# Patient Record
Sex: FEMALE | Race: WHITE | NOT HISPANIC OR LATINO
[De-identification: names, ages, dates, MRNs, and addresses within clinical notes are randomized per-mention and may not be internally consistent; named-entity substitution may affect disease eponyms.]

---

## 2019-12-17 ENCOUNTER — APPOINTMENT (OUTPATIENT)
Dept: ENDOCRINOLOGY | Facility: CLINIC | Age: 29
End: 2019-12-17
Payer: COMMERCIAL

## 2019-12-17 VITALS
SYSTOLIC BLOOD PRESSURE: 122 MMHG | DIASTOLIC BLOOD PRESSURE: 80 MMHG | BODY MASS INDEX: 40.5 KG/M2 | HEART RATE: 80 BPM | HEIGHT: 66 IN | WEIGHT: 252 LBS

## 2019-12-17 DIAGNOSIS — Z78.9 OTHER SPECIFIED HEALTH STATUS: ICD-10-CM

## 2019-12-17 DIAGNOSIS — Z83.3 FAMILY HISTORY OF DIABETES MELLITUS: ICD-10-CM

## 2019-12-17 DIAGNOSIS — Z86.39 PERSONAL HISTORY OF OTHER ENDOCRINE, NUTRITIONAL AND METABOLIC DISEASE: ICD-10-CM

## 2019-12-17 PROCEDURE — 99204 OFFICE O/P NEW MOD 45 MIN: CPT

## 2019-12-17 NOTE — ASSESSMENT
[FreeTextEntry1] : hypothyroid, euthyroid on replacement. Check thyroid US due to size of thyroid.\par obesity, complicated by diabetes risk, likely insulin resistance, and PCOS. baseline labs ordered, to include ovarian function to document ovulation, in view of pt's desire for pregnancy. \par Nutritional eval advised. COnsider future use of metformin if weight loss insufficient.

## 2019-12-17 NOTE — PHYSICAL EXAM
[Well Nourished] : well nourished [Well Developed] : well developed [EOMI] : extra ocular movement intact [No Proptosis] : no proptosis [No Accessory Muscle Use] : no accessory muscle use [Clear to Auscultation] : lungs were clear to auscultation bilaterally [Normal Rate] : heart rate was normal  [Regular Rhythm] : with a regular rhythm [No Stigmata of Cushings Syndrome] : no stigmata of cushings syndrome [No Joint Swelling] : no joint swelling seen [Normal Strength/Tone] : muscle strength and tone were normal [Acanthosis Nigricans] : no acanthosis nigricans [No Motor Deficits] : the motor exam was normal [No Tremors] : no tremors [Normal Affect] : the affect was normal [Normal Mood] : the mood was normal [de-identified] : generalized obesity [de-identified] : bulky uniformly enlarged thyroid, no palpable nodule [de-identified] : warm and dry

## 2019-12-17 NOTE — REASON FOR VISIT
[Initial Eval - Existing Diagnosis] : an initial evaluation of an existing diagnosis [FreeTextEntry1] : Hypothyroidism and PCOS

## 2019-12-31 ENCOUNTER — TRANSCRIPTION ENCOUNTER (OUTPATIENT)
Age: 29
End: 2019-12-31

## 2020-01-07 ENCOUNTER — RX RENEWAL (OUTPATIENT)
Age: 30
End: 2020-01-07

## 2020-01-17 ENCOUNTER — APPOINTMENT (OUTPATIENT)
Dept: ENDOCRINOLOGY | Facility: CLINIC | Age: 30
End: 2020-01-17

## 2020-01-23 ENCOUNTER — APPOINTMENT (OUTPATIENT)
Dept: ENDOCRINOLOGY | Facility: CLINIC | Age: 30
End: 2020-01-23
Payer: COMMERCIAL

## 2020-01-23 PROCEDURE — G0108 DIAB MANAGE TRN  PER INDIV: CPT

## 2020-01-24 LAB — HBA1C MFR BLD HPLC: 6.5

## 2020-03-26 ENCOUNTER — APPOINTMENT (OUTPATIENT)
Dept: ENDOCRINOLOGY | Facility: CLINIC | Age: 30
End: 2020-03-26

## 2020-04-01 ENCOUNTER — RX RENEWAL (OUTPATIENT)
Age: 30
End: 2020-04-01

## 2020-04-21 ENCOUNTER — APPOINTMENT (OUTPATIENT)
Dept: ENDOCRINOLOGY | Facility: CLINIC | Age: 30
End: 2020-04-21
Payer: COMMERCIAL

## 2020-04-21 PROCEDURE — 99441: CPT

## 2020-07-09 ENCOUNTER — APPOINTMENT (OUTPATIENT)
Dept: OBGYN | Facility: CLINIC | Age: 30
End: 2020-07-09
Payer: COMMERCIAL

## 2020-07-09 VITALS
WEIGHT: 235 LBS | BODY MASS INDEX: 37.77 KG/M2 | DIASTOLIC BLOOD PRESSURE: 70 MMHG | SYSTOLIC BLOOD PRESSURE: 120 MMHG | HEIGHT: 66 IN

## 2020-07-09 DIAGNOSIS — Z83.3 FAMILY HISTORY OF DIABETES MELLITUS: ICD-10-CM

## 2020-07-09 DIAGNOSIS — Z00.00 ENCOUNTER FOR GENERAL ADULT MEDICAL EXAMINATION W/OUT ABNORMAL FINDINGS: ICD-10-CM

## 2020-07-09 DIAGNOSIS — E66.01 MORBID (SEVERE) OBESITY DUE TO EXCESS CALORIES: ICD-10-CM

## 2020-07-09 DIAGNOSIS — Z82.49 FAMILY HISTORY OF ISCHEMIC HEART DISEASE AND OTHER DISEASES OF THE CIRCULATORY SYSTEM: ICD-10-CM

## 2020-07-09 DIAGNOSIS — Z01.419 ENCOUNTER FOR GYNECOLOGICAL EXAMINATION (GENERAL) (ROUTINE) W/OUT ABNORMAL FINDINGS: ICD-10-CM

## 2020-07-09 DIAGNOSIS — Z80.0 FAMILY HISTORY OF MALIGNANT NEOPLASM OF DIGESTIVE ORGANS: ICD-10-CM

## 2020-07-09 DIAGNOSIS — Z80.3 FAMILY HISTORY OF MALIGNANT NEOPLASM OF BREAST: ICD-10-CM

## 2020-07-09 PROCEDURE — 99385 PREV VISIT NEW AGE 18-39: CPT

## 2020-07-09 NOTE — HISTORY OF PRESENT ILLNESS
[Last Colonoscopy ___] : Last colonoscopy [unfilled] [Last Pap ___] : Last cervical pap smear was [unfilled] [Last Mammogram ___] : Last Mammogram was [unfilled] [Sexually Active] : is sexually active [Male ___] : [unfilled] male

## 2020-07-10 NOTE — COUNSELING
[Nutrition] : nutrition [Breast Self Exam] : breast self exam [Exercise] : exercise [STD (testing, results, tx)] : STD (testing, results, tx) [Fertility Options] : fertility options [Vitamins/Supplements] : vitamins/supplements [Weight Management] : weight management

## 2020-07-10 NOTE — REVIEW OF SYSTEMS
[Urinary Incontinence] : urinary incontinence [Abdominal Pain] : abdominal pain [Sleep Disturbances] : sleep disturbances [FreeTextEntry1] : nausea

## 2020-07-10 NOTE — PHYSICAL EXAM
[Awake] : awake [Alert] : alert [Soft] : soft [Oriented x3] : oriented to person, place, and time [Labia Majora] : labia major [No Bleeding] : there was no active vaginal bleeding [Normal] : clitoris [Labia Minora] : labia minora [Uterine Adnexae] : were not tender and not enlarged [Acute Distress] : no acute distress [Mass] : no breast mass [Nipple Discharge] : no nipple discharge [Axillary LAD] : no axillary lymphadenopathy [Tender] : non tender [FreeTextEntry6] : normal

## 2020-07-10 NOTE — DISCUSSION/SUMMARY
[FreeTextEntry1] : Pt encouraged to continue with weight loss program, health risk reviewed.  Discussed ovulation, and timing of coitus.  Pt is taking PNV.  All the pt's questions and concerns were addressed.

## 2020-07-12 LAB — HPV HIGH+LOW RISK DNA PNL CVX: NOT DETECTED

## 2020-07-14 LAB — CYTOLOGY CVX/VAG DOC THIN PREP: NORMAL

## 2020-08-21 ENCOUNTER — TRANSCRIPTION ENCOUNTER (OUTPATIENT)
Age: 30
End: 2020-08-21

## 2020-10-02 ENCOUNTER — RX RENEWAL (OUTPATIENT)
Age: 30
End: 2020-10-02

## 2020-11-04 LAB — HBA1C MFR BLD HPLC: 5.8

## 2020-11-05 ENCOUNTER — APPOINTMENT (OUTPATIENT)
Dept: ENDOCRINOLOGY | Facility: CLINIC | Age: 30
End: 2020-11-05
Payer: COMMERCIAL

## 2020-11-05 VITALS
HEART RATE: 74 BPM | HEIGHT: 66 IN | WEIGHT: 239 LBS | BODY MASS INDEX: 38.41 KG/M2 | DIASTOLIC BLOOD PRESSURE: 70 MMHG | SYSTOLIC BLOOD PRESSURE: 124 MMHG | OXYGEN SATURATION: 98 %

## 2020-11-05 PROCEDURE — 99072 ADDL SUPL MATRL&STAF TM PHE: CPT

## 2020-11-05 PROCEDURE — 99214 OFFICE O/P EST MOD 30 MIN: CPT

## 2020-11-05 NOTE — REVIEW OF SYSTEMS
[Recent Weight Gain (___ Lbs)] : recent weight gain: [unfilled] lbs [Cold Intolerance] : cold intolerance [Fatigue] : no fatigue [Decreased Appetite] : appetite not decreased [Visual Field Defect] : no visual field defect [Blurred Vision] : no blurred vision [Dysphagia] : no dysphagia [Neck Pain] : no neck pain [Dysphonia] : no dysphonia [Chest Pain] : no chest pain [Palpitations] : no palpitations [Constipation] : no constipation [Diarrhea] : no diarrhea [Polyuria] : no polyuria [Dysuria] : no dysuria [Headaches] : no headaches [Tremors] : no tremors [Depression] : no depression [Anxiety] : no anxiety [Polydipsia] : no polydipsia [Heat Intolerance] : no heat intolerance [Swelling] : no swelling

## 2020-11-05 NOTE — HISTORY OF PRESENT ILLNESS
[FreeTextEntry1] : Quality: Hypothyroidism\par Severity: moderate \par Duration: over 4 years ago \par Onset: weight gain, fatigue \par Modifying Factors: Better with medication \par Associated Symptoms: no neck pain, dysphonia, or dysphagia \par Family History: diabetes - "all women in my family"\par \par Notes:\par Desires pregnancy - current trying to conceive, taking Prenatal vitamins and Vitamin D  \par LMP 10/20/20 - about 28 days in between menses  \par \par \par Current Regimen: Levothyroxine 88 mcg daily - taking appropriately, waits an hour to eat or drink anything \par \par \par Labs reviewed: No recent labs \par \par Date of last thyroid sonogram: 1/7/20 Impression: Heterogenous in appearance without focal nodules. The right lobe is normal in size and the left lobe is mildly prominent size. Borderline enlarged benign appearing level II lymph node see in each side of neck. \par \par Prediabetes: lifestyle modifications only \par \par PCOS: Contraceptive: None \par mildly irregular menses, no amenorrhea \par briefly on OCPs\par \par Chronic Obesity \par Weight: fluctuates about 20 pounds \par

## 2020-11-05 NOTE — PHYSICAL EXAM
[Alert] : alert [Obese] : obese [No Acute Distress] : no acute distress [Normal Sclera/Conjunctiva] : normal sclera/conjunctiva [EOMI] : extra ocular movement intact [No LAD] : no lymphadenopathy [No Thyroid Nodules] : no palpable thyroid nodules [No Respiratory Distress] : no respiratory distress [Normal Rate and Effort] : normal respiratory rate and effort [Clear to Auscultation] : lungs were clear to auscultation bilaterally [Normal S1, S2] : normal S1 and S2 [Normal Rate] : heart rate was normal [Regular Rhythm] : with a regular rhythm [No Edema] : no peripheral edema [Normal Bowel Sounds] : normal bowel sounds [Not Tender] : non-tender [Soft] : abdomen soft [No Stigmata of Cushings Syndrome] : no stigmata of Cushings Syndrome [Normal Gait] : normal gait [No Rash] : no rash [Acanthosis Nigricans] : no acanthosis nigricans [No Motor Deficits] : the motor exam was normal [No Tremors] : no tremors [Oriented x3] : oriented to person, place, and time [Normal Insight/Judgement] : insight and judgment were intact [Normal Mood] : the mood was normal [de-identified] : bulky uniformly enlarged

## 2020-11-05 NOTE — REASON FOR VISIT
[Follow - Up] : a follow-up visit [DM Type 2] : DM Type 2 [Hypothyroidism] : hypothyroidism [PCOS] : PCOS [Weight Management/Obesity] : weight management/obesity

## 2020-11-05 NOTE — ASSESSMENT
[FreeTextEntry1] : 29 y/o obese female with Hypothyroidism, Type 2 DM (now prediabetes), and PCOS.\par \par Plan: \par Hypothyroidism: check TFTs now\par - reviewed TSH goal < 2.5 in pregnancy \par - continue current dose of LT4\par \par Type 2 DM (now prediabetes): check A1C now \par \par PCOS: start Metformin 500 mg BID, CoQ10 200 mg and Vitamin E 400 units daily \par \par Obesity: educated on healthy food choices\par - encouraged to continue routine exercise 150 minutes a week \par \par RTO in 3 months or sooner if becomes pregnant  [Importance of Diet and Exercise] : importance of diet and exercise to improve glycemic control, achieve weight loss and improve cardiovascular health [Levothyroxine] : The patient was instructed to take Levothyroxine on an empty stomach, separate from vitamins, and wait at least 30 minutes before eating

## 2020-12-23 PROBLEM — Z01.419 ENCOUNTER FOR ANNUAL ROUTINE GYNECOLOGICAL EXAMINATION: Status: RESOLVED | Noted: 2020-07-09 | Resolved: 2020-12-23

## 2021-01-29 ENCOUNTER — RX RENEWAL (OUTPATIENT)
Age: 31
End: 2021-01-29

## 2021-02-04 ENCOUNTER — APPOINTMENT (OUTPATIENT)
Dept: ENDOCRINOLOGY | Facility: CLINIC | Age: 31
End: 2021-02-04

## 2021-03-07 ENCOUNTER — TRANSCRIPTION ENCOUNTER (OUTPATIENT)
Age: 31
End: 2021-03-07

## 2021-03-18 ENCOUNTER — APPOINTMENT (OUTPATIENT)
Dept: OBGYN | Facility: CLINIC | Age: 31
End: 2021-03-18
Payer: MEDICAID

## 2021-03-18 ENCOUNTER — ASOB RESULT (OUTPATIENT)
Age: 31
End: 2021-03-18

## 2021-03-18 VITALS
HEIGHT: 66 IN | TEMPERATURE: 97 F | DIASTOLIC BLOOD PRESSURE: 80 MMHG | BODY MASS INDEX: 38.89 KG/M2 | SYSTOLIC BLOOD PRESSURE: 124 MMHG | WEIGHT: 242 LBS

## 2021-03-18 DIAGNOSIS — Z31.69 ENCOUNTER FOR OTHER GENERAL COUNSELING AND ADVICE ON PROCREATION: ICD-10-CM

## 2021-03-18 DIAGNOSIS — Z12.4 ENCOUNTER FOR SCREENING FOR MALIGNANT NEOPLASM OF CERVIX: ICD-10-CM

## 2021-03-18 LAB
HCG UR QL: POSITIVE
QUALITY CONTROL: YES

## 2021-03-18 PROCEDURE — 76817 TRANSVAGINAL US OBSTETRIC: CPT

## 2021-03-18 PROCEDURE — 81025 URINE PREGNANCY TEST: CPT

## 2021-03-18 PROCEDURE — 36415 COLL VENOUS BLD VENIPUNCTURE: CPT

## 2021-03-18 PROCEDURE — 99213 OFFICE O/P EST LOW 20 MIN: CPT

## 2021-03-18 NOTE — REVIEW OF SYSTEMS
[Negative] : Heme/Lymph [Abn Vaginal bleeding] : no abnormal vaginal bleeding [Pelvic pain] : pelvic pain

## 2021-03-18 NOTE — DISCUSSION/SUMMARY
[FreeTextEntry1] : Today UCG: positive. \par \par Pelvic sono ordered due to cramping and to assess viability of pregnancy. Small GS. No YS or FP. Will check beta and progesterone today and she will return on Monday for repeat labs.\par \par Precautions discussed.\par \par Recommended her to continue taking PNV. She expressed understanding.\par \par All questions and concerns were addressed.\par \par \par \par

## 2021-03-18 NOTE — HISTORY OF PRESENT ILLNESS
[N] : Patient does not use contraception [Y] : Positive pregnancy history [Menarche Age: ____] : age at menarche was [unfilled] [No] : Patient does not have concerns regarding sex [Currently Active] : currently active [Men] : men [PapSmeardate] : 7/09/2020 [TextBox_31] : wnl [HPVDate] : 7/09/2020 [TextBox_78] : neg [LMPDate] : 2/09/21 [PGxTotal] : 1 [FreeTextEntry1] : 2/09/21

## 2021-03-18 NOTE — END OF VISIT
[FreeTextEntry3] : I, Kingsley Edmonds, acted solely as a scribe for Dr. Barnett on this date 03/18/2021.\par All medical record entries made by the Scribe were at my, Dr. Barnett's direction and personally dictated by me on  03/18/2021. I have reviewed the chart and agree that the record accurately reflects my personal performance of the history, physical exam, assessment and plan. I have also personally directed, reviewed, and agreed with the chart.\par

## 2021-03-20 LAB
ABO + RH PNL BLD: NORMAL
BLD GP AB SCN SERPL QL: NORMAL
C TRACH RRNA SPEC QL NAA+PROBE: NOT DETECTED
HCG-TM SERPL-MCNC: 1122 MIU/ML
N GONORRHOEA RRNA SPEC QL NAA+PROBE: NOT DETECTED
PROGEST SERPL-MCNC: 17.3 NG/ML
SOURCE AMPLIFICATION: NORMAL

## 2021-03-21 ENCOUNTER — RX RENEWAL (OUTPATIENT)
Age: 31
End: 2021-03-21

## 2021-03-22 ENCOUNTER — APPOINTMENT (OUTPATIENT)
Dept: OBGYN | Facility: CLINIC | Age: 31
End: 2021-03-22
Payer: MEDICAID

## 2021-03-22 PROCEDURE — 36415 COLL VENOUS BLD VENIPUNCTURE: CPT

## 2021-03-25 ENCOUNTER — NON-APPOINTMENT (OUTPATIENT)
Age: 31
End: 2021-03-25

## 2021-03-26 ENCOUNTER — APPOINTMENT (OUTPATIENT)
Dept: OBGYN | Facility: CLINIC | Age: 31
End: 2021-03-26
Payer: MEDICAID

## 2021-03-26 ENCOUNTER — ASOB RESULT (OUTPATIENT)
Age: 31
End: 2021-03-26

## 2021-03-26 VITALS
HEIGHT: 66 IN | WEIGHT: 242 LBS | TEMPERATURE: 97.8 F | BODY MASS INDEX: 38.89 KG/M2 | DIASTOLIC BLOOD PRESSURE: 68 MMHG | SYSTOLIC BLOOD PRESSURE: 120 MMHG

## 2021-03-26 PROCEDURE — 76817 TRANSVAGINAL US OBSTETRIC: CPT

## 2021-03-26 PROCEDURE — 36415 COLL VENOUS BLD VENIPUNCTURE: CPT

## 2021-03-26 PROCEDURE — 99213 OFFICE O/P EST LOW 20 MIN: CPT | Mod: 25

## 2021-03-26 PROCEDURE — 81003 URINALYSIS AUTO W/O SCOPE: CPT | Mod: QW

## 2021-03-26 RX ORDER — METFORMIN ER 500 MG 500 MG/1
500 TABLET ORAL TWICE DAILY
Qty: 180 | Refills: 0 | Status: DISCONTINUED | COMMUNITY
Start: 2020-11-05 | End: 2021-03-26

## 2021-03-28 LAB
B19V IGG SER QL IA: 8.44 INDEX
B19V IGG+IGM SER-IMP: NORMAL
B19V IGG+IGM SER-IMP: POSITIVE
B19V IGM FLD-ACNC: 0.12 INDEX
B19V IGM SER-ACNC: NEGATIVE
BASOPHILS # BLD AUTO: 0.04 K/UL
BASOPHILS NFR BLD AUTO: 0.4 %
CMV IGG SERPL QL: <0.2 U/ML
CMV IGG SERPL-IMP: NEGATIVE
CMV IGM SERPL QL: <8 AU/ML
CMV IGM SERPL QL: NEGATIVE
EOSINOPHIL # BLD AUTO: 0.1 K/UL
EOSINOPHIL NFR BLD AUTO: 1 %
HBV SURFACE AG SER QL: NONREACTIVE
HCT VFR BLD CALC: 38.5 %
HGB BLD-MCNC: 12.5 G/DL
HIV1+2 AB SPEC QL IA.RAPID: NONREACTIVE
IMM GRANULOCYTES NFR BLD AUTO: 0.3 %
LYMPHOCYTES # BLD AUTO: 2.71 K/UL
LYMPHOCYTES NFR BLD AUTO: 27.5 %
MAN DIFF?: NORMAL
MCHC RBC-ENTMCNC: 31.6 PG
MCHC RBC-ENTMCNC: 32.5 GM/DL
MCV RBC AUTO: 97.5 FL
MEV IGG FLD QL IA: >300 AU/ML
MEV IGG+IGM SER-IMP: POSITIVE
MONOCYTES # BLD AUTO: 0.5 K/UL
MONOCYTES NFR BLD AUTO: 5.1 %
NEUTROPHILS # BLD AUTO: 6.46 K/UL
NEUTROPHILS NFR BLD AUTO: 65.7 %
PLATELET # BLD AUTO: 273 K/UL
RBC # BLD: 3.95 M/UL
RBC # FLD: 13.5 %
RUBV IGG FLD-ACNC: 4.4 INDEX
RUBV IGG SER-IMP: POSITIVE
T GONDII AB SER-IMP: NEGATIVE
T GONDII IGM SER QL: <3 AU/ML
T PALLIDUM AB SER QL IA: NEGATIVE
TSH SERPL-ACNC: 1.77 UIU/ML
WBC # FLD AUTO: 9.84 K/UL

## 2021-03-28 NOTE — REVIEW OF SYSTEMS
[Sight Problems] : sight problems [Negative] : Heme/Lymph [FreeTextEntry7] : no pelvic pain today no cramping as per pt

## 2021-03-28 NOTE — DISCUSSION/SUMMARY
[FreeTextEntry1] : We reviewed the results of pelvic US revealed SIUP of 6 weeks w/ FH: 106. She was given a full description of the findings and expressed understanding. We discussed benign findings on pelvic exam which was consistent w/ closed and long cervix. No vaginal blood was noted.  I explained to her that as her uterus response to pregnancy, it gets more globular and stretchy due to which she is experiencing some cramping. She was oriented to the practice and delivery at [facility]. Initial OB labs were collected today. RTO in 4 weeks for NT scan. All questions and concerns were addressed.\par \par We reviewed the nutritional needs and restrictions associated with pregnancy. She is aware of the collaborative nature of our practice and that we deliver our babies at [facility].  \par \par \par During this visit 20 minutes were spent face-to-face with greater than 50% of the time dedicated to counseling.\par \par

## 2021-03-28 NOTE — HISTORY OF PRESENT ILLNESS
[N] : Patient does not use contraception [Y] : Patient is sexually active [Menarche Age: ____] : age at menarche was [unfilled] [Currently Active] : currently active [Men] : men [Vaginal] : vaginal [No] : No [TextBox_4] : Early Ob cramping comes and goes , pt states left arm going numb thru out the day  [PapSmeardate] : 7/9/20 [TextBox_31] : negative [LMPDate] : 2/9/21 [PGxTotal] : 1 [[facility] [[facility]] : [facility] [TextBox_29] : not today [TextBox_36] : n/a [TextBox_6] : 2/9/21 [TextBox_9] : 14 [TextBox_28] : n/a [TextBox_34] : n/a [FreeTextEntry1] : 2/9/21

## 2021-03-28 NOTE — END OF VISIT
[FreeTextEntry3] : I, Kingsley Edmonds, acted solely as a scribe for Dr. Samuel on this date 03/26/2021.\par All medical record entries made by the Scribe were at my, Dr. Samuel's direction and personally dictated by me on  03/26/2021. I have reviewed the chart and agree that the record accurately reflects my personal performance of the history, physical exam, assessment and plan. I have also personally directed, reviewed, and agreed with the chart.\par

## 2021-04-02 LAB
HCG SERPL-MCNC: 3294 MIU/ML
PROGEST SERPL-MCNC: 16.2 NG/ML

## 2021-04-16 ENCOUNTER — APPOINTMENT (OUTPATIENT)
Dept: ENDOCRINOLOGY | Facility: CLINIC | Age: 31
End: 2021-04-16

## 2021-04-23 ENCOUNTER — APPOINTMENT (OUTPATIENT)
Dept: OBGYN | Facility: CLINIC | Age: 31
End: 2021-04-23
Payer: MEDICAID

## 2021-04-23 ENCOUNTER — NON-APPOINTMENT (OUTPATIENT)
Age: 31
End: 2021-04-23

## 2021-04-23 ENCOUNTER — LABORATORY RESULT (OUTPATIENT)
Age: 31
End: 2021-04-23

## 2021-04-23 PROCEDURE — 99072 ADDL SUPL MATRL&STAF TM PHE: CPT

## 2021-04-23 PROCEDURE — 76817 TRANSVAGINAL US OBSTETRIC: CPT

## 2021-04-23 PROCEDURE — 0500F INITIAL PRENATAL CARE VISIT: CPT

## 2021-04-26 LAB — BACTERIA UR CULT: NORMAL

## 2021-05-07 ENCOUNTER — NON-APPOINTMENT (OUTPATIENT)
Age: 31
End: 2021-05-07

## 2021-05-07 ENCOUNTER — APPOINTMENT (OUTPATIENT)
Dept: OBGYN | Facility: CLINIC | Age: 31
End: 2021-05-07
Payer: MEDICAID

## 2021-05-07 VITALS
WEIGHT: 239 LBS | HEIGHT: 66 IN | DIASTOLIC BLOOD PRESSURE: 80 MMHG | SYSTOLIC BLOOD PRESSURE: 116 MMHG | BODY MASS INDEX: 38.41 KG/M2

## 2021-05-07 PROCEDURE — 76813 OB US NUCHAL MEAS 1 GEST: CPT

## 2021-05-07 PROCEDURE — 0502F SUBSEQUENT PRENATAL CARE: CPT

## 2021-05-07 PROCEDURE — 99072 ADDL SUPL MATRL&STAF TM PHE: CPT

## 2021-05-07 PROCEDURE — 36415 COLL VENOUS BLD VENIPUNCTURE: CPT

## 2021-05-08 LAB
BILIRUB UR QL STRIP: NORMAL
GLUCOSE UR-MCNC: NORMAL
HCG UR QL: 0.2 EU/DL
HGB UR QL STRIP.AUTO: NORMAL
KETONES UR-MCNC: NORMAL
LEUKOCYTE ESTERASE UR QL STRIP: NORMAL
NITRITE UR QL STRIP: NORMAL
PH UR STRIP: 7
PROT UR STRIP-MCNC: NORMAL
SP GR UR STRIP: 1.02

## 2021-05-11 ENCOUNTER — NON-APPOINTMENT (OUTPATIENT)
Age: 31
End: 2021-05-11

## 2021-05-18 LAB — GLUCOSE 1H P 50 G GLC PO SERPL-MCNC: 158 MG/DL

## 2021-05-20 ENCOUNTER — NON-APPOINTMENT (OUTPATIENT)
Age: 31
End: 2021-05-20

## 2021-05-28 LAB
1ST TRIMESTER DATA: NORMAL
ADDENDUM DOC: NORMAL
AFP PNL SERPL: NORMAL
AFP SERPL-ACNC: NORMAL
CLINICAL BIOCHEMIST REVIEW: NORMAL
FREE BETA HCG 1ST TRIMESTER: NORMAL
GLUCOSE 1H P 100 G GLC PO SERPL-MCNC: 241 MG/DL
GLUCOSE 2H P CHAL SERPL-MCNC: 145 MG/DL
GLUCOSE 3H P CHAL SERPL-MCNC: 129 MG/DL
GLUCOSE BS SERPL-MCNC: 111 MG/DL
Lab: NORMAL
NASAL BONE: PRESENT
NOTES NTD: NORMAL
NT: NORMAL
PAPP-A SERPL-ACNC: NORMAL
TRISOMY 18/3: NORMAL

## 2021-06-02 ENCOUNTER — NON-APPOINTMENT (OUTPATIENT)
Age: 31
End: 2021-06-02

## 2021-06-02 ENCOUNTER — ASOB RESULT (OUTPATIENT)
Age: 31
End: 2021-06-02

## 2021-06-02 ENCOUNTER — APPOINTMENT (OUTPATIENT)
Dept: MATERNAL FETAL MEDICINE | Facility: CLINIC | Age: 31
End: 2021-06-02
Payer: MEDICAID

## 2021-06-02 VITALS — WEIGHT: 239 LBS | HEIGHT: 66 IN | BODY MASS INDEX: 38.41 KG/M2

## 2021-06-02 DIAGNOSIS — Z86.39 PERSONAL HISTORY OF OTHER ENDOCRINE, NUTRITIONAL AND METABOLIC DISEASE: ICD-10-CM

## 2021-06-02 PROCEDURE — G0108 DIAB MANAGE TRN  PER INDIV: CPT | Mod: 95

## 2021-06-03 ENCOUNTER — NON-APPOINTMENT (OUTPATIENT)
Age: 31
End: 2021-06-03

## 2021-06-03 ENCOUNTER — APPOINTMENT (OUTPATIENT)
Dept: OBGYN | Facility: CLINIC | Age: 31
End: 2021-06-03
Payer: MEDICAID

## 2021-06-03 VITALS
BODY MASS INDEX: 38.09 KG/M2 | HEIGHT: 66 IN | DIASTOLIC BLOOD PRESSURE: 80 MMHG | WEIGHT: 237 LBS | SYSTOLIC BLOOD PRESSURE: 100 MMHG

## 2021-06-03 LAB
BILIRUB UR QL STRIP: NORMAL
GLUCOSE UR-MCNC: NORMAL
HCG UR QL: 0.2 EU/DL
HGB UR QL STRIP.AUTO: ABNORMAL
KETONES UR-MCNC: ABNORMAL
LEUKOCYTE ESTERASE UR QL STRIP: NORMAL
NITRITE UR QL STRIP: NORMAL
PH UR STRIP: 5.5
PROT UR STRIP-MCNC: NORMAL
SP GR UR STRIP: 1.02

## 2021-06-03 PROCEDURE — 0502F SUBSEQUENT PRENATAL CARE: CPT

## 2021-06-05 LAB — BACTERIA UR CULT: NORMAL

## 2021-06-08 LAB
1ST TRIMESTER DATA: NORMAL
2ND TRIMESTER DATA: NORMAL
AFP PNL SERPL: NORMAL
AFP SERPL-ACNC: NORMAL
AFP SERPL-ACNC: NORMAL
B-HCG FREE SERPL-MCNC: NORMAL
CLINICAL BIOCHEMIST REVIEW: NORMAL
FREE BETA HCG 1ST TRIMESTER: NORMAL
INHIBIN A SERPL-MCNC: NORMAL
NASAL BONE: PRESENT
NOTES NTD: NORMAL
NT: NORMAL
PAPP-A SERPL-ACNC: NORMAL
U ESTRIOL SERPL-SCNC: NORMAL

## 2021-06-14 ENCOUNTER — APPOINTMENT (OUTPATIENT)
Dept: ANTEPARTUM | Facility: CLINIC | Age: 31
End: 2021-06-14

## 2021-06-14 ENCOUNTER — APPOINTMENT (OUTPATIENT)
Dept: MATERNAL FETAL MEDICINE | Facility: CLINIC | Age: 31
End: 2021-06-14
Payer: MEDICAID

## 2021-06-14 VITALS
WEIGHT: 238 LBS | HEART RATE: 78 BPM | HEIGHT: 66 IN | SYSTOLIC BLOOD PRESSURE: 112 MMHG | BODY MASS INDEX: 38.25 KG/M2 | DIASTOLIC BLOOD PRESSURE: 70 MMHG | RESPIRATION RATE: 16 BRPM | OXYGEN SATURATION: 98 %

## 2021-06-14 DIAGNOSIS — Z78.9 OTHER SPECIFIED HEALTH STATUS: ICD-10-CM

## 2021-06-14 PROCEDURE — 99204 OFFICE O/P NEW MOD 45 MIN: CPT

## 2021-06-14 PROCEDURE — 99072 ADDL SUPL MATRL&STAF TM PHE: CPT

## 2021-06-14 RX ORDER — ISOPROPYL ALCOHOL 0.7 ML/ML
SWAB TOPICAL
Qty: 1 | Refills: 3 | Status: ACTIVE | COMMUNITY
Start: 2021-06-14 | End: 1900-01-01

## 2021-06-14 RX ORDER — LANCETS 30 GAUGE
EACH MISCELLANEOUS
Qty: 1 | Refills: 1 | Status: ACTIVE | COMMUNITY
Start: 2020-01-23 | End: 1900-01-01

## 2021-06-14 RX ORDER — CONTAINER,EMPTY
EACH MISCELLANEOUS
Qty: 1 | Refills: 0 | Status: ACTIVE | COMMUNITY
Start: 2021-06-14 | End: 1900-01-01

## 2021-06-14 NOTE — SURGICAL HISTORY
[Fibroids] : no fibroids [Abn Paps] : no abnormal pap smears [Breast Disease] : no breast disease [STI's] : no STI's [Cysts] : no cysts [Infertility] : no infertility [OC Use] : no OC use [Last Pap: ___] : Last Pap: [unfilled] [Last Mammo: ___] : Last Mammo: none

## 2021-06-14 NOTE — DISCUSSION/SUMMARY
[FreeTextEntry1] : We had the pleasure of meeting with your patient, Elaine Calvillo, for a maternal-fetal medicine consultation. As you know, the patient is a 31-year-old  at 17 6/7 weeks with a pregnancy complicated by gestational diabetes, hypothyroidism, and obesity.\par \par She reports no complaints today. She denies contractions, leaking and vaginal bleeding.\par \par In 2020, her HgbA1c was 6.5%, which is consistent with type 2 diabetes, but repeat HgbA1c in 2020 was 5.8%. No recent HgbA1c available for evaluation. \par \par She is currently taking prenatal vitamins, low dose aspirin, and levothyroxine 88 mcg daily.\par \par She was extensively counseled regarding the following issues:\par \par •	Gestational diabetes\par \par Elaine was counseled regarding diet, blood sugar testing, and managing diabetes during pregnancy. Tight glycemic control in pregnancy is necessary to decrease fetal and  risks including macrosomia, shoulder dystocia, medically or obstetrically-indicated  delivery,  section, polyhydramnios, and preeclampsia. It was discussed that women who are able to maintain good glycemic control with diet and/or medication generally have favorable obstetric outcomes. She understands that fasting glucose values should be <90 and 1-hour postprandial values should be <140. Her fingerstick log was reviewed. Fasting fingerstick glucose values are persistently elevated. Postprandial values are within the goal range. She was instructed to continue checking fingersticks 4 times daily and to bring her log to all appointments. I recommend starting insulin NPH 14 units at bedtime—prescription sent to pharmacy. Her fingerstick log will be reassessed at her next visit to determine whether further adjustment of her insulin regimen is necessary. She is encouraged to have a two-hour glucose tolerance test at 6-8 weeks postpartum to evaluate for diabetes mellitus and insulin resistance. In general, a patient with gestational diabetes well-controlled on insulin should be delivered no earlier than 39 0/7 weeks and no later than 39 6/7 weeks. However, if glycemic control remains poor then delivery may need to occur earlier to avoid fetopathy and stillbirth.\par \par •	Hypothyroidism in pregnancy\par \par The patient was counseled that hypothyroidism is pregnancy, when not well controlled, is associated with an increased risk of  birth, gestational hypertension, preeclampsia, low birth weight, placental abruption and fetal neurocognitive impairment. Thus, maintaining optimal thyroid function is essential. She is currently taking levothyroxine at an effective dose of 88 mcg daily. In general, TSH should be tested every trimester to confirm that it is within trimester-specific goal ranges: 0.1-2.5 uIU/mL in the first trimester, 0.2-3 uIU/mL in the second trimester and 0.3-3 uIU/mL in the third trimester. Follow-up with an endocrinologist is scheduled for 21.\par \par •	Obesity, class 2\par \par Obesity is associated with an increased risk for pregnancy complications such as gestational diabetes and preeclampsia. Complications from surgery are increased, as well as failure of regional anesthesia. In addition, the fetus is at increased risk for congenital anomalies, most commonly neural tube defects and cardiac anomalies, even in the absence of diabetes.  Malformations are more difficult to assess by ultrasound evaluation due to the decreased sensitivity of ultrasound in women with a high BMI. During pregnancy, optimum weight gain recommended by the [employer] 2009 Guidelines is 11-20 pounds. Furthermore, pregnant women with obesity are at a greater risk for venous thromboembolism. If a  section is performed, chemoprophylaxis is recommended during postpartum hospitalization. \par \par She will follow-up with the diabetes educator/nutritionist on 21.\par Follow-up growth ultrasounds every 4 weeks\par Weekly fetal testing to start by 36 weeks.\par \par \par Thank you for requesting a consultation on this patient for gestational diabetes, obesity, hypothyroidism. The total time spent in preparation for this visit, medical history taking, orders, review of records, counseling the patient, and writing this note was 53 minutes.\par \par At the end of our discussion, the patient indicated that her questions were answered and she seemed satisfied with our discussion. Please do not hesitate to contact us with any questions.\par \par \par Sincerely,\par \par \par Piyush Dela Cruz MD, OG\par Attending Physician, Maternal-Fetal Medicine\par \par

## 2021-06-14 NOTE — ACTIVE PROBLEMS
[Hypertension] : no hypertension [Heart Disease] : no heart disease [Autoimmune Disease] : no autoimmune disease [Renal Disease] : no kidney disease, no UTI [Neurologic Disorder] : no neurologic disorder, no epilepsy [Psychiatric Disorders] : no psychiatric disorders [Depression] : no depression, no post partum depression [Hepatic Disorder] : no hepatitis, no liver disease [Thrombophlebitis] : no varicosities, no phlebitis [Trauma] : no trauma/violence [Blood Transfusion (___ Ml)] : no history of blood transfusion

## 2021-06-14 NOTE — OB HISTORY
[LMP: ___] : LMP: [unfilled] [RICHARD: ___] : RICHARD: [unfilled] [EGA: ___ wks] : EGA: [unfilled] wks [Spontaneous] : Spontaneous conception [Sonogram] : sonogram [at ___ wks] : at [unfilled] weeks [Definite:  ___ (Date)] : the last menstrual period was [unfilled] [Normal Amount/Duration] : was of a normal amount and duration [Spotting Between  Menses] : no spotting between menses [Regular Cycle Intervals] : periods have been regular [Frequency: Q ___ days] : menstrual periods occur approximately every [unfilled] days [Menarche Age: ____] : age at menarche was [unfilled] [Menstrual Cramps] : no menstrual cramps [On BCP at conception] : the patient was not on BCP at conception

## 2021-06-14 NOTE — FAMILY HISTORY
[Age 35+ During Pregnancy] : not 35 or over during pregnancy [Reported Family History Of Birth Defects] : no congenital heart defects [Jean Marie-Sachs Carrier] : no Jean Marie-Sachs [Family History] : no mental retardation/autism [Reported Family History Of Genetic Disease] : no history of child defect in child of baby father

## 2021-06-15 LAB
ESTIMATED AVERAGE GLUCOSE: 117 MG/DL
HBA1C MFR BLD HPLC: 5.7 %

## 2021-06-21 ENCOUNTER — APPOINTMENT (OUTPATIENT)
Dept: ENDOCRINOLOGY | Facility: CLINIC | Age: 31
End: 2021-06-21
Payer: MEDICAID

## 2021-06-21 VITALS
HEIGHT: 66 IN | WEIGHT: 233 LBS | SYSTOLIC BLOOD PRESSURE: 110 MMHG | DIASTOLIC BLOOD PRESSURE: 70 MMHG | BODY MASS INDEX: 37.45 KG/M2 | HEART RATE: 92 BPM

## 2021-06-21 LAB — GLUCOSE BLDC GLUCOMTR-MCNC: 112

## 2021-06-21 PROCEDURE — 82962 GLUCOSE BLOOD TEST: CPT

## 2021-06-21 PROCEDURE — 99214 OFFICE O/P EST MOD 30 MIN: CPT | Mod: 25

## 2021-06-21 PROCEDURE — 99072 ADDL SUPL MATRL&STAF TM PHE: CPT

## 2021-06-21 RX ORDER — INSULIN HUMAN 100 [IU]/ML
100 INJECTION, SUSPENSION SUBCUTANEOUS
Qty: 1 | Refills: 0 | Status: DISCONTINUED | COMMUNITY
Start: 2021-06-14 | End: 2021-06-21

## 2021-06-21 RX ORDER — SYRINGE-NEEDLE,INSULIN,0.5 ML 31 GX5/16"
31G X 5/16" SYRINGE, EMPTY DISPOSABLE MISCELLANEOUS
Qty: 3 | Refills: 1 | Status: DISCONTINUED | COMMUNITY
Start: 2021-06-14 | End: 2021-06-21

## 2021-06-21 NOTE — ASSESSMENT
[FreeTextEntry1] : 31 y/o obese female with Hypothyroidism, Type 2 DM (now prediabetes), and PCOS.\par \par Plan: \par Hypothyroidism: check TFTs now\par - TSH goal < 2.5 in pregnancy \par - continue current dose of LT4\par - repeat TFTs every 4-6 weeks during pregnancy \par \par Type 2 DM (now prediabetes): now has GDM and is being treated by MFM\par - increased Humulin N to 18 units at bedtime, recommend increasing insulin by 2 units at night until less then 90 fasting\par - advised pt. follow up with MFM \par \par PCOS: pregnant \par \par Obesity: educated on healthy food choices\par - encouraged to continue routine exercise 150 minutes a week \par \par RTO in 8 weeks  [Levothyroxine] : The patient was instructed to take Levothyroxine on an empty stomach, separate from vitamins, and wait at least 30 minutes before eating

## 2021-06-21 NOTE — REVIEW OF SYSTEMS
[Recent Weight Loss (___ Lbs)] : recent weight loss: [unfilled] lbs [Nausea] : nausea [Anxiety] : anxiety [Fatigue] : no fatigue [Decreased Appetite] : appetite not decreased [Recent Weight Gain (___ Lbs)] : no recent weight gain [Visual Field Defect] : no visual field defect [Blurred Vision] : no blurred vision [Dysphagia] : no dysphagia [Neck Pain] : no neck pain [Dysphonia] : no dysphonia [Chest Pain] : no chest pain [Palpitations] : no palpitations [Constipation] : no constipation [Vomiting] : no vomiting [Diarrhea] : no diarrhea [Polyuria] : no polyuria [Dry Skin] : no dry skin [Hair Loss] : no hair loss [Headaches] : no headaches [Tremors] : no tremors [Depression] : no depression [Polydipsia] : no polydipsia [Cold Intolerance] : no cold intolerance [Heat Intolerance] : no heat intolerance

## 2021-06-21 NOTE — HISTORY OF PRESENT ILLNESS
[FreeTextEntry1] : Pt. reports she was diagnosis with GDM and started on Humulin N. She is still having fasting blood sugars over 90.\par \par Quality: Hypothyroidism\par Severity: moderate \par Duration: over 4 years ago \par Onset: weight gain, fatigue \par Modifying Factors: Better with medication \par Associated Symptoms: no neck pain, dysphonia, or dysphagia \par Family History: diabetes - "all women in my family"\par \par Notes:\par Currently 19 weeks gestation \par EDC 11/9/21\par \par \par Current Regimen: Levothyroxine 88 mcg daily - taking appropriately, waits an hour to eat or drink anything \par \par \par Labs reviewed: A1C 5.7\par \par Date of last thyroid sonogram: 1/7/20 Impression: Heterogenous in appearance without focal nodules. The right lobe is normal in size and the left lobe is mildly prominent size. Borderline enlarged benign appearing level II lymph node see in each side of neck. \par \par Prediabetes: lifestyle modifications only \par \par PCOS: pregnant \par \par Chronic Obesity \par Weight: loss 5 pounds\par

## 2021-06-21 NOTE — PHYSICAL EXAM
[Alert] : alert [Obese] : obese [No Acute Distress] : no acute distress [Normal Sclera/Conjunctiva] : normal sclera/conjunctiva [EOMI] : extra ocular movement intact [No LAD] : no lymphadenopathy [No Thyroid Nodules] : no palpable thyroid nodules [No Respiratory Distress] : no respiratory distress [Normal Rate and Effort] : normal respiratory rate and effort [Clear to Auscultation] : lungs were clear to auscultation bilaterally [Normal S1, S2] : normal S1 and S2 [Normal Rate] : heart rate was normal [Regular Rhythm] : with a regular rhythm [No Edema] : no peripheral edema [Normal Bowel Sounds] : normal bowel sounds [Not Tender] : non-tender [Soft] : abdomen soft [No Stigmata of Cushings Syndrome] : no stigmata of Cushings Syndrome [Normal Gait] : normal gait [No Rash] : no rash [Acanthosis Nigricans] : no acanthosis nigricans [No Motor Deficits] : the motor exam was normal [No Tremors] : no tremors [Oriented x3] : oriented to person, place, and time [Normal Insight/Judgement] : insight and judgment were intact [Normal Mood] : the mood was normal [de-identified] : bulky uniformly enlarged

## 2021-06-27 LAB
ABO + RH PNL BLD: NORMAL
AR GENE MUT ANL BLD/T: NORMAL
BLD GP AB SCN SERPL QL: NORMAL
CFTR MUT TESTED BLD/T: NEGATIVE
FMR1 GENE MUT ANL BLD/T: NORMAL
HGB A MFR BLD: 97.4 %
HGB A2 MFR BLD: 2.6 %
HGB FRACT BLD-IMP: NORMAL
M TB IFN-G BLD-IMP: NEGATIVE
QUANTIFERON TB PLUS MITOGEN MINUS NIL: 8.63 IU/ML
QUANTIFERON TB PLUS NIL: 0.01 IU/ML
QUANTIFERON TB PLUS TB1 MINUS NIL: 0 IU/ML
QUANTIFERON TB PLUS TB2 MINUS NIL: 0 IU/ML
RUBV IGM FLD-ACNC: <20 AU/ML

## 2021-06-30 ENCOUNTER — ASOB RESULT (OUTPATIENT)
Age: 31
End: 2021-06-30

## 2021-06-30 ENCOUNTER — APPOINTMENT (OUTPATIENT)
Dept: MATERNAL FETAL MEDICINE | Facility: CLINIC | Age: 31
End: 2021-06-30
Payer: MEDICAID

## 2021-06-30 VITALS — WEIGHT: 233 LBS | HEIGHT: 66 IN | BODY MASS INDEX: 37.45 KG/M2

## 2021-06-30 PROCEDURE — G0108 DIAB MANAGE TRN  PER INDIV: CPT | Mod: 95

## 2021-07-01 ENCOUNTER — APPOINTMENT (OUTPATIENT)
Dept: OBGYN | Facility: CLINIC | Age: 31
End: 2021-07-01

## 2021-07-01 ENCOUNTER — NON-APPOINTMENT (OUTPATIENT)
Age: 31
End: 2021-07-01

## 2021-07-01 ENCOUNTER — ASOB RESULT (OUTPATIENT)
Age: 31
End: 2021-07-01

## 2021-07-01 ENCOUNTER — RX RENEWAL (OUTPATIENT)
Age: 31
End: 2021-07-01

## 2021-07-01 ENCOUNTER — APPOINTMENT (OUTPATIENT)
Dept: ANTEPARTUM | Facility: CLINIC | Age: 31
End: 2021-07-01
Payer: MEDICAID

## 2021-07-01 VITALS
SYSTOLIC BLOOD PRESSURE: 120 MMHG | DIASTOLIC BLOOD PRESSURE: 66 MMHG | BODY MASS INDEX: 37.93 KG/M2 | HEIGHT: 66 IN | TEMPERATURE: 97.7 F | WEIGHT: 236 LBS

## 2021-07-01 PROCEDURE — 76811 OB US DETAILED SNGL FETUS: CPT

## 2021-07-01 PROCEDURE — 99072 ADDL SUPL MATRL&STAF TM PHE: CPT

## 2021-07-01 RX ORDER — INSULIN HUMAN 100 [IU]/ML
100 INJECTION, SUSPENSION SUBCUTANEOUS
Qty: 4 | Refills: 3 | Status: DISCONTINUED | COMMUNITY
Start: 2021-06-21 | End: 2021-07-01

## 2021-07-01 RX ORDER — CALCIUM CARB/VITAMIN D3/VIT K1 500-100-40
31G X 5/16" TABLET,CHEWABLE ORAL
Qty: 1 | Refills: 3 | Status: ACTIVE | COMMUNITY
Start: 2021-07-01 | End: 1900-01-01

## 2021-07-11 ENCOUNTER — NON-APPOINTMENT (OUTPATIENT)
Age: 31
End: 2021-07-11

## 2021-07-13 ENCOUNTER — APPOINTMENT (OUTPATIENT)
Dept: PEDIATRIC CARDIOLOGY | Facility: CLINIC | Age: 31
End: 2021-07-13
Payer: MEDICAID

## 2021-07-13 LAB
BILIRUB UR QL STRIP: NORMAL
GLUCOSE UR-MCNC: NORMAL
HCG UR QL: 0.2 EU/DL
HGB UR QL STRIP.AUTO: NORMAL
KETONES UR-MCNC: NORMAL
LEUKOCYTE ESTERASE UR QL STRIP: ABNORMAL
NITRITE UR QL STRIP: NORMAL
PH UR STRIP: 6
PROT UR STRIP-MCNC: NORMAL
SP GR UR STRIP: 1.02

## 2021-07-13 PROCEDURE — 93325 DOPPLER ECHO COLOR FLOW MAPG: CPT | Mod: 59

## 2021-07-13 PROCEDURE — 76821 MIDDLE CEREBRAL ARTERY ECHO: CPT

## 2021-07-13 PROCEDURE — 76825 ECHO EXAM OF FETAL HEART: CPT

## 2021-07-13 PROCEDURE — 99204 OFFICE O/P NEW MOD 45 MIN: CPT | Mod: 25

## 2021-07-13 PROCEDURE — 76820 UMBILICAL ARTERY ECHO: CPT

## 2021-07-13 PROCEDURE — 76827 ECHO EXAM OF FETAL HEART: CPT

## 2021-07-14 ENCOUNTER — ASOB RESULT (OUTPATIENT)
Age: 31
End: 2021-07-14

## 2021-07-14 ENCOUNTER — APPOINTMENT (OUTPATIENT)
Dept: MATERNAL FETAL MEDICINE | Facility: CLINIC | Age: 31
End: 2021-07-14
Payer: MEDICAID

## 2021-07-14 VITALS — HEIGHT: 66 IN | BODY MASS INDEX: 37.45 KG/M2 | WEIGHT: 233 LBS

## 2021-07-14 PROCEDURE — G0108 DIAB MANAGE TRN  PER INDIV: CPT | Mod: 95

## 2021-07-20 ENCOUNTER — TRANSCRIPTION ENCOUNTER (OUTPATIENT)
Age: 31
End: 2021-07-20

## 2021-07-26 ENCOUNTER — APPOINTMENT (OUTPATIENT)
Dept: ANTEPARTUM | Facility: CLINIC | Age: 31
End: 2021-07-26
Payer: MEDICAID

## 2021-07-26 ENCOUNTER — APPOINTMENT (OUTPATIENT)
Dept: MATERNAL FETAL MEDICINE | Facility: CLINIC | Age: 31
End: 2021-07-26
Payer: MEDICAID

## 2021-07-26 ENCOUNTER — ASOB RESULT (OUTPATIENT)
Age: 31
End: 2021-07-26

## 2021-07-26 VITALS
RESPIRATION RATE: 16 BRPM | WEIGHT: 241 LBS | HEART RATE: 88 BPM | DIASTOLIC BLOOD PRESSURE: 78 MMHG | BODY MASS INDEX: 38.73 KG/M2 | SYSTOLIC BLOOD PRESSURE: 122 MMHG | HEIGHT: 66 IN | OXYGEN SATURATION: 98 %

## 2021-07-26 DIAGNOSIS — Z3A.12 12 WEEKS GESTATION OF PREGNANCY: ICD-10-CM

## 2021-07-26 DIAGNOSIS — Z34.91 ENCOUNTER FOR SUPERVISION OF NORMAL PREGNANCY, UNSPECIFIED, FIRST TRIMESTER: ICD-10-CM

## 2021-07-26 DIAGNOSIS — Z3A.20 20 WEEKS GESTATION OF PREGNANCY: ICD-10-CM

## 2021-07-26 DIAGNOSIS — Z3A.22 22 WEEKS GESTATION OF PREGNANCY: ICD-10-CM

## 2021-07-26 PROCEDURE — 99072 ADDL SUPL MATRL&STAF TM PHE: CPT

## 2021-07-26 PROCEDURE — 76816 OB US FOLLOW-UP PER FETUS: CPT

## 2021-07-26 PROCEDURE — 99214 OFFICE O/P EST MOD 30 MIN: CPT | Mod: 25

## 2021-07-26 NOTE — VITALS
[LMP (date): ___] : LMP was on [unfilled] [GA =___ Weeks] : which calculates to a GA of [unfilled] weeks [GA= ___ Days] : and [unfilled] day(s) [RICHARD by LMP (date): ___] : The calculated RICHARD by LMP is [unfilled] [By LMP] : this is the final RICHARD

## 2021-07-26 NOTE — DISCUSSION/SUMMARY
[FreeTextEntry1] : We had the pleasure of meeting with your patient, Elaine Calvillo, for a maternal-fetal medicine consultation. As you know, the patient is a 31-year-old  at 23 6/7 weeks with a pregnancy complicated by gestational diabetes, hypothyroidism, and obesity.\par \par She reports no complaints today. She denies contractions, leaking and vaginal bleeding.\par \par In 2020, her HgbA1c was 6.5%, which is consistent with type 2 diabetes, but repeat HgbA1c in 2020 was 5.8%. No recent HgbA1c available for evaluation. \par \par She is currently taking prenatal vitamins, Humulin N 57 units at bedtime, low dose aspirin, and levothyroxine 88 mcg daily.\par \par She was extensively counseled regarding the following issues:\par \par •	Gestational diabetes\par \par Elaine understands that fasting glucose values should be <90 and 1-hour postprandial values should be <140. Her fingerstick log was reviewed. Fasting fingerstick glucose values are within the goal range on her current insulin regimen. She was instructed to continue checking fingersticks 4 times daily and to bring her log to all appointments. I recommend no changes to her medication dose at this time. Her fingerstick log will be reassessed at her next visit to determine whether further adjustment of her insulin regimen is necessary. She is encouraged to have a two-hour glucose tolerance test at 6-8 weeks postpartum to evaluate for diabetes mellitus and insulin resistance. In general, a patient with gestational diabetes well-controlled on insulin should be delivered no earlier than 39 0/7 weeks and no later than 39 6/7 weeks. However, if glycemic control remains poor then delivery may need to occur earlier to avoid fetopathy and stillbirth. Nutrition appt 21.\par \par •	Hypothyroidism in pregnancy\par \par Elaine is currently taking levothyroxine at an effective dose. In general, TSH should be tested every trimester to confirm that it is within trimester-specific goal ranges: 0.1-2.5 uIU/mL in the first trimester, 0.2-3 uIU/mL in the second trimester and 0.3-3 uIU/mL in the third trimester. Follow-up with an endocrinologist is scheduled for 21.\par \par •	Obesity, class 2\par \par During pregnancy, optimum weight gain for this patient is 11-20 pounds. If a  section is performed, chemoprophylaxis is recommended during postpartum hospitalization. \par \par She will follow-up with the diabetes educator/nutritionist on 21.\par Follow-up growth ultrasounds every 4 weeks\par Weekly fetal testing to start by 36 weeks.\par \par \par Thank you for requesting a consultation on this patient for gestational diabetes, obesity, hypothyroidism. The total time spent in preparation for this visit, medical history taking, orders, review of records, counseling the patient, and writing this note was 32 minutes.\par \par At the end of our discussion, the patient indicated that her questions were answered and she seemed satisfied with our discussion. Please do not hesitate to contact us with any questions.\par \par \par Sincerely,\par \par \par Piyush Dela Cruz MD, OG\par Attending Physician, Maternal-Fetal Medicine\par \par

## 2021-07-27 ENCOUNTER — NON-APPOINTMENT (OUTPATIENT)
Age: 31
End: 2021-07-27

## 2021-07-27 ENCOUNTER — APPOINTMENT (OUTPATIENT)
Dept: OBGYN | Facility: CLINIC | Age: 31
End: 2021-07-27
Payer: MEDICAID

## 2021-07-27 VITALS
HEIGHT: 66 IN | DIASTOLIC BLOOD PRESSURE: 66 MMHG | WEIGHT: 242 LBS | BODY MASS INDEX: 38.89 KG/M2 | SYSTOLIC BLOOD PRESSURE: 114 MMHG

## 2021-07-27 LAB
BILIRUB UR QL STRIP: NORMAL
GLUCOSE UR-MCNC: NORMAL
HCG UR QL: 0.2 EU/DL
HGB UR QL STRIP.AUTO: NORMAL
KETONES UR-MCNC: NORMAL
LEUKOCYTE ESTERASE UR QL STRIP: ABNORMAL
NITRITE UR QL STRIP: NORMAL
PH UR STRIP: 7
PROT UR STRIP-MCNC: NORMAL
SP GR UR STRIP: 1.02

## 2021-07-27 PROCEDURE — 0502F SUBSEQUENT PRENATAL CARE: CPT

## 2021-07-27 PROCEDURE — 81002 URINALYSIS NONAUTO W/O SCOPE: CPT | Mod: NC

## 2021-07-29 ENCOUNTER — APPOINTMENT (OUTPATIENT)
Dept: PEDIATRIC CARDIOLOGY | Facility: CLINIC | Age: 31
End: 2021-07-29
Payer: MEDICAID

## 2021-07-29 PROCEDURE — 76820 UMBILICAL ARTERY ECHO: CPT

## 2021-07-29 PROCEDURE — 76826 ECHO EXAM OF FETAL HEART: CPT

## 2021-07-29 PROCEDURE — 76821 MIDDLE CEREBRAL ARTERY ECHO: CPT

## 2021-07-29 PROCEDURE — 99215 OFFICE O/P EST HI 40 MIN: CPT | Mod: 25

## 2021-07-29 PROCEDURE — 76828 ECHO EXAM OF FETAL HEART: CPT

## 2021-07-29 PROCEDURE — 93325 DOPPLER ECHO COLOR FLOW MAPG: CPT | Mod: 59

## 2021-08-06 ENCOUNTER — TRANSCRIPTION ENCOUNTER (OUTPATIENT)
Age: 31
End: 2021-08-06

## 2021-08-06 ENCOUNTER — RX RENEWAL (OUTPATIENT)
Age: 31
End: 2021-08-06

## 2021-08-11 ENCOUNTER — APPOINTMENT (OUTPATIENT)
Dept: MATERNAL FETAL MEDICINE | Facility: CLINIC | Age: 31
End: 2021-08-11
Payer: MEDICAID

## 2021-08-11 ENCOUNTER — LABORATORY RESULT (OUTPATIENT)
Age: 31
End: 2021-08-11

## 2021-08-11 ENCOUNTER — ASOB RESULT (OUTPATIENT)
Age: 31
End: 2021-08-11

## 2021-08-11 VITALS — HEIGHT: 66 IN | WEIGHT: 241 LBS | BODY MASS INDEX: 38.73 KG/M2

## 2021-08-11 PROCEDURE — G0108 DIAB MANAGE TRN  PER INDIV: CPT | Mod: 95

## 2021-08-17 ENCOUNTER — APPOINTMENT (OUTPATIENT)
Dept: OBGYN | Facility: CLINIC | Age: 31
End: 2021-08-17
Payer: MEDICAID

## 2021-08-17 ENCOUNTER — NON-APPOINTMENT (OUTPATIENT)
Age: 31
End: 2021-08-17

## 2021-08-17 VITALS
WEIGHT: 247 LBS | DIASTOLIC BLOOD PRESSURE: 62 MMHG | HEIGHT: 66 IN | SYSTOLIC BLOOD PRESSURE: 112 MMHG | BODY MASS INDEX: 39.7 KG/M2

## 2021-08-17 DIAGNOSIS — Z32.01 ENCOUNTER FOR PREGNANCY TEST, RESULT POSITIVE: ICD-10-CM

## 2021-08-17 DIAGNOSIS — B37.2 CANDIDIASIS OF SKIN AND NAIL: ICD-10-CM

## 2021-08-17 DIAGNOSIS — R82.998 OTHER ABNORMAL FINDINGS IN URINE: ICD-10-CM

## 2021-08-17 DIAGNOSIS — O26.899 OTHER SPECIFIED PREGNANCY RELATED CONDITIONS, UNSPECIFIED TRIMESTER: ICD-10-CM

## 2021-08-17 DIAGNOSIS — R10.9 OTHER SPECIFIED PREGNANCY RELATED CONDITIONS, UNSPECIFIED TRIMESTER: ICD-10-CM

## 2021-08-17 PROCEDURE — 0502F SUBSEQUENT PRENATAL CARE: CPT

## 2021-08-23 ENCOUNTER — ASOB RESULT (OUTPATIENT)
Age: 31
End: 2021-08-23

## 2021-08-23 ENCOUNTER — APPOINTMENT (OUTPATIENT)
Dept: ANTEPARTUM | Facility: CLINIC | Age: 31
End: 2021-08-23
Payer: MEDICAID

## 2021-08-23 ENCOUNTER — APPOINTMENT (OUTPATIENT)
Dept: MATERNAL FETAL MEDICINE | Facility: CLINIC | Age: 31
End: 2021-08-23
Payer: MEDICAID

## 2021-08-23 ENCOUNTER — APPOINTMENT (OUTPATIENT)
Dept: ENDOCRINOLOGY | Facility: CLINIC | Age: 31
End: 2021-08-23
Payer: MEDICAID

## 2021-08-23 VITALS
WEIGHT: 247 LBS | HEIGHT: 66 IN | HEART RATE: 94 BPM | BODY MASS INDEX: 39.7 KG/M2 | DIASTOLIC BLOOD PRESSURE: 70 MMHG | SYSTOLIC BLOOD PRESSURE: 110 MMHG

## 2021-08-23 VITALS
DIASTOLIC BLOOD PRESSURE: 68 MMHG | SYSTOLIC BLOOD PRESSURE: 92 MMHG | HEIGHT: 66 IN | RESPIRATION RATE: 18 BRPM | BODY MASS INDEX: 39.74 KG/M2 | OXYGEN SATURATION: 98 % | HEART RATE: 96 BPM | WEIGHT: 247.25 LBS

## 2021-08-23 LAB — GLUCOSE BLDC GLUCOMTR-MCNC: 80

## 2021-08-23 PROCEDURE — 76821 MIDDLE CEREBRAL ARTERY ECHO: CPT

## 2021-08-23 PROCEDURE — 76816 OB US FOLLOW-UP PER FETUS: CPT

## 2021-08-23 PROCEDURE — 82962 GLUCOSE BLOOD TEST: CPT

## 2021-08-23 PROCEDURE — 99214 OFFICE O/P EST MOD 30 MIN: CPT | Mod: 25

## 2021-08-23 PROCEDURE — ZZZZZ: CPT

## 2021-08-23 PROCEDURE — 76820 UMBILICAL ARTERY ECHO: CPT

## 2021-08-23 PROCEDURE — 93976 VASCULAR STUDY: CPT

## 2021-08-23 PROCEDURE — 99215 OFFICE O/P EST HI 40 MIN: CPT

## 2021-08-23 NOTE — PHYSICAL EXAM
[Alert] : alert [Obese] : obese [No Acute Distress] : no acute distress [Normal Sclera/Conjunctiva] : normal sclera/conjunctiva [EOMI] : extra ocular movement intact [No LAD] : no lymphadenopathy [No Thyroid Nodules] : no palpable thyroid nodules [No Respiratory Distress] : no respiratory distress [Normal Rate and Effort] : normal respiratory rate and effort [Clear to Auscultation] : lungs were clear to auscultation bilaterally [Normal S1, S2] : normal S1 and S2 [Normal Rate] : heart rate was normal [Regular Rhythm] : with a regular rhythm [No Edema] : no peripheral edema [Normal Bowel Sounds] : normal bowel sounds [Not Tender] : non-tender [Soft] : abdomen soft [No Stigmata of Cushings Syndrome] : no stigmata of Cushings Syndrome [Normal Gait] : normal gait [No Rash] : no rash [Acanthosis Nigricans] : no acanthosis nigricans [No Motor Deficits] : the motor exam was normal [No Tremors] : no tremors [Oriented x3] : oriented to person, place, and time [Normal Insight/Judgement] : insight and judgment were intact [Normal Mood] : the mood was normal [de-identified] : bulky uniformly enlarged

## 2021-08-23 NOTE — ACTIVE PROBLEMS
[Heart Disease] : no heart disease [Hypertension] : no hypertension [Autoimmune Disease] : no autoimmune disease [Renal Disease] : no kidney disease, no UTI [Neurologic Disorder] : no neurologic disorder, no epilepsy [Psychiatric Disorders] : no psychiatric disorders [Depression] : no depression, no post partum depression [Hepatic Disorder] : no hepatitis, no liver disease [Thrombophlebitis] : no varicosities, no phlebitis [Trauma] : no trauma/violence [Blood Transfusion (___ Ml)] : no history of blood transfusion

## 2021-08-23 NOTE — REVIEW OF SYSTEMS
[Recent Weight Gain (___ Lbs)] : recent weight gain: [unfilled] lbs [Back Pain] : back pain [Headaches] : headaches [Heat Intolerance] : heat intolerance [Fatigue] : no fatigue [Decreased Appetite] : appetite not decreased [Visual Field Defect] : no visual field defect [Blurred Vision] : no blurred vision [Dysphagia] : no dysphagia [Neck Pain] : no neck pain [Dysphonia] : no dysphonia [Chest Pain] : no chest pain [Palpitations] : no palpitations [Dry Skin] : no dry skin [Hair Loss] : no hair loss [Tremors] : no tremors [Depression] : no depression [Anxiety] : no anxiety [Cold Intolerance] : no cold intolerance [de-identified] : occasional

## 2021-08-23 NOTE — ASSESSMENT
[FreeTextEntry1] : 30 y/o obese female with Hypothyroidism, Type 2 DM (now prediabetes), and PCOS.\par \par Plan: \par Hypothyroidism: euthyroid on replacement \par - TSH goal < 2.5 in pregnancy \par - continue current dose of LT4\par - repeat TFTs every 4-6 weeks during pregnancy \par \par Type 2 DM (now prediabetes): now has GDM and is being treated by MFM\par - continue to follow up with MFM \par \par PCOS: pregnant \par \par Obesity: educated on healthy food choices\par - encouraged to continue routine exercise 150 minutes a week \par \par RTO in 8 weeks  [Levothyroxine] : The patient was instructed to take Levothyroxine on an empty stomach, separate from vitamins, and wait at least 30 minutes before eating

## 2021-08-23 NOTE — HISTORY OF PRESENT ILLNESS
[FreeTextEntry1] : Quality: Hypothyroidism\par Severity: moderate \par Duration: over 4 years ago \par Onset: weight gain, fatigue \par Modifying Factors: Better with medication \par Associated Symptoms: no neck pain, dysphonia, or dysphagia \par Family History: diabetes - "all women in my family"\par \par Notes: Obstetrician: [facility] \par Currently 28 weeks gestation (boy Jay)\par EDC 11/9/21\par \par \par Current Regimen: Levothyroxine 88 mcg daily - taking appropriately, waits an hour to eat or drink anything \par \par Labs: \par 8/11/21\par TSH 1.9, Free T4 0.9\par \par \par Labs reviewed: A1C 5.7\par \par Date of last thyroid sonogram: 1/7/20 Impression: Heterogenous in appearance without focal nodules. The right lobe is normal in size and the left lobe is mildly prominent size. Borderline enlarged benign appearing level II lymph node see in each side of neck. \par \par Type 2 DM (now Prediabetes): dx with GDM - insulin controlled and is followed by MFM. Her fasting blood sugars have been in the 80s.\par \par PCOS: pregnant \par \par Chronic Obesity \par Weight: gained 10 pounds\par

## 2021-08-23 NOTE — REASON FOR VISIT
[Follow - Up] : a follow-up visit [DM Type 2] : DM Type 2 [Hypothyroidism] : hypothyroidism [PCOS] : PCOS

## 2021-08-24 NOTE — OB HISTORY
[LMP: ___] : LMP: [unfilled] [RICHARD: ___] : RICHARD: [unfilled] [Spontaneous] : Spontaneous conception [Sonogram] : sonogram [at ___ wks] : at [unfilled] weeks [Definite:  ___ (Date)] : the last menstrual period was [unfilled] [Normal Amount/Duration] : was of a normal amount and duration [Regular Cycle Intervals] : periods have been regular [Frequency: Q ___ days] : menstrual periods occur approximately every [unfilled] days [Menarche Age: ____] : age at menarche was [unfilled] [EGA: ___ wks] : EGA: [unfilled] wks [FreeTextEntry1] : Her first prenatal visit was April 23, 2021. [Spotting Between  Menses] : no spotting between menses [Menstrual Cramps] : no menstrual cramps [On BCP at conception] : the patient was not on BCP at conception

## 2021-08-24 NOTE — DISCUSSION/SUMMARY
[FreeTextEntry1] : We had the pleasure of seeing Ms. Calvillo for a followup Maternal-Fetal Medicine consultation today. She is a 31 year old G 1 P 0000 at 27w 6d of gestation (RICHARD of 2021 by LMP) \par \par Hypothyroidism in pregnancy  \par Hypothyroidism is defined as an elevated TSH with low T 4 or a TSH greater than 10 mIU/L. Overt maternal hypothyroidism has consistently been associated with an increased risk of adverse pregnancy complications. These include premature birth, low birth weight, pregnancy loss, placental abruption, and fetal death. Thus, maintaining optimal thyroid function is essential. She is currently taking levothyroxine 88 mcg and her most recent TSH was 1.97 on  with Free T4 0.9 ng/dL. The hypothyroidism is being managed by an endocrinologist. Generally, in pregnancy, TSH should be tested at least every trimester to confirm that it is within trimester-specific goal ranges: 0.1 - 2.5 uIU/mL in the first trimester, 0.2 - 3 uIU/mL in the second trimester and 0.3 - 3 uIU/mL in the third trimester and every 4-6 weeks if medication dose is adjusted.  We recommend increasing the dose of levothyroxine due to the low normal Free T4 level. \par \par Type 2 diabetes mellitus  \par She was diagnosed with T2DM on 2020 with a hemoglobin A1C of  6.5%. She was managed with diet and exercise. She was then started on Metformin 500 mg twice daily by her endocrinologist on 10/5/2020 for PCOS. Patient was counseled regarding diet, blood sugar testing, and managing diabetes during pregnancy. Tight glycemic control in pregnancy is necessary to decrease fetal and  risks including macrosomia, shoulder dystocia, medically or obstetrically indicated  delivery,  section, polyhydramnios, and preeclampsia. It was discussed that women who are able to maintain good glycemic control with diet and/or medication generally have favorable obstetric outcomes. She understands that fasting glucose values should be <90 and 1-hour postprandial values should be <140. She was instructed to check fingersticks 4 times daily and to bring her glucose log to all appointments. She self titrated the bedtime Humulin NPH insulin to 68 units on .  Her glucose fingerstick log was reviewed today, which showed great improvement in her fasting values. She was noted to have few elevations in her postprandial values. We discussed dietary changes and increasing in ambulation after meals. Antepartum fetal surveillance should begin at 32 weeks and serial growth ultrasounds should be continued throughout pregnancy. Delivery timing will be better delineated as gestation progresses. Today, the fetus was in breech presentation, the estimated fetal weight was 1033gm (15%ile) and there was a normal umbilical artery Doppler. Hemoglobin A1c done on Ashlie 15, 2021 was 5.7% which corresponds to a daily estimated average glucose of 117 mg/dL.\par \par At the end of our discussion, the patient indicated that her questions were answered, and she seemed satisfied with our discussion. Please do not hesitate to contact us with any questions. \par \par Recommendations: \par -Consider increasing levothyroxine/Synthroid to 100 mcg daily and repeating Thyroid functions tests (TSH, Free T4) in 4 weeks \par -Continue bedtime Humulin NPH insulin at 68 units. \par -Dietary consultation follow up on 9/10\par -Follow-up ultrasound examination in 4 weeks\par -Follow up Maternal-Fetal Medicine consultation in 4 weeks \par - testing starting at 32 weeks \par \par \par \par Sincerely, \par \par Cyn Carmona MD,  \par Fellow, Maternal-Fetal Medicine \par \par Arnoldo Hunter MD\par Attending, Maternal-Fetal Medicine

## 2021-09-10 ENCOUNTER — ASOB RESULT (OUTPATIENT)
Age: 31
End: 2021-09-10

## 2021-09-10 ENCOUNTER — APPOINTMENT (OUTPATIENT)
Dept: MATERNAL FETAL MEDICINE | Facility: CLINIC | Age: 31
End: 2021-09-10
Payer: MEDICAID

## 2021-09-10 PROCEDURE — G0108 DIAB MANAGE TRN  PER INDIV: CPT | Mod: 95

## 2021-09-14 ENCOUNTER — APPOINTMENT (OUTPATIENT)
Dept: OBGYN | Facility: CLINIC | Age: 31
End: 2021-09-14
Payer: MEDICAID

## 2021-09-14 VITALS
HEIGHT: 66 IN | BODY MASS INDEX: 39.86 KG/M2 | WEIGHT: 248 LBS | DIASTOLIC BLOOD PRESSURE: 70 MMHG | SYSTOLIC BLOOD PRESSURE: 124 MMHG

## 2021-09-14 DIAGNOSIS — O99.212 OBESITY COMPLICATING PREGNANCY, SECOND TRIMESTER: ICD-10-CM

## 2021-09-14 PROCEDURE — 0502F SUBSEQUENT PRENATAL CARE: CPT

## 2021-09-14 PROCEDURE — 90471 IMMUNIZATION ADMIN: CPT

## 2021-09-14 PROCEDURE — 90715 TDAP VACCINE 7 YRS/> IM: CPT

## 2021-09-15 ENCOUNTER — LABORATORY RESULT (OUTPATIENT)
Age: 31
End: 2021-09-15

## 2021-09-15 LAB
BILIRUB UR QL STRIP: NORMAL
ESTIMATED AVERAGE GLUCOSE: 111 MG/DL
GLUCOSE UR-MCNC: NORMAL
HBA1C MFR BLD HPLC: 5.5 %
HCG UR QL: 0.2 EU/DL
HGB UR QL STRIP.AUTO: NORMAL
KETONES UR-MCNC: NORMAL
LEUKOCYTE ESTERASE UR QL STRIP: ABNORMAL
NITRITE UR QL STRIP: NORMAL
PH UR STRIP: 7
PROT UR STRIP-MCNC: NORMAL
SP GR UR STRIP: 1.02

## 2021-09-16 ENCOUNTER — TRANSCRIPTION ENCOUNTER (OUTPATIENT)
Age: 31
End: 2021-09-16

## 2021-09-17 ENCOUNTER — NON-APPOINTMENT (OUTPATIENT)
Age: 31
End: 2021-09-17

## 2021-09-17 LAB
CREAT SPEC-SCNC: 67 MG/DL
CREAT/PROT UR: 0.1 RATIO
PROT UR-MCNC: 6 MG/DL

## 2021-09-17 RX ORDER — ELECTROLYTES/DEXTROSE
32G X 4 MM SOLUTION, ORAL ORAL
Qty: 1 | Refills: 1 | Status: ACTIVE | COMMUNITY
Start: 2021-09-17 | End: 1900-01-01

## 2021-09-20 ENCOUNTER — APPOINTMENT (OUTPATIENT)
Dept: ANTEPARTUM | Facility: CLINIC | Age: 31
End: 2021-09-20
Payer: MEDICAID

## 2021-09-20 ENCOUNTER — ASOB RESULT (OUTPATIENT)
Age: 31
End: 2021-09-20

## 2021-09-20 ENCOUNTER — APPOINTMENT (OUTPATIENT)
Dept: MATERNAL FETAL MEDICINE | Facility: CLINIC | Age: 31
End: 2021-09-20
Payer: MEDICAID

## 2021-09-20 VITALS
RESPIRATION RATE: 16 BRPM | SYSTOLIC BLOOD PRESSURE: 110 MMHG | WEIGHT: 250 LBS | HEIGHT: 66 IN | HEART RATE: 84 BPM | OXYGEN SATURATION: 98 % | DIASTOLIC BLOOD PRESSURE: 68 MMHG | BODY MASS INDEX: 40.18 KG/M2

## 2021-09-20 VITALS
OXYGEN SATURATION: 98 % | DIASTOLIC BLOOD PRESSURE: 68 MMHG | SYSTOLIC BLOOD PRESSURE: 110 MMHG | HEIGHT: 66 IN | WEIGHT: 250 LBS | RESPIRATION RATE: 16 BRPM | BODY MASS INDEX: 40.18 KG/M2 | HEART RATE: 84 BPM

## 2021-09-20 DIAGNOSIS — Z23 ENCOUNTER FOR IMMUNIZATION: ICD-10-CM

## 2021-09-20 PROCEDURE — 93976 VASCULAR STUDY: CPT

## 2021-09-20 PROCEDURE — 76820 UMBILICAL ARTERY ECHO: CPT

## 2021-09-20 PROCEDURE — 99215 OFFICE O/P EST HI 40 MIN: CPT | Mod: 25

## 2021-09-20 PROCEDURE — 76816 OB US FOLLOW-UP PER FETUS: CPT

## 2021-09-20 RX ORDER — NYSTATIN 100000 1/G
100000 POWDER TOPICAL
Qty: 15 | Refills: 0 | Status: DISCONTINUED | COMMUNITY
Start: 2021-08-17 | End: 2021-09-20

## 2021-09-20 RX ORDER — NYSTATIN 100MM UNIT
POWDER (EA) MISCELLANEOUS
Qty: 1 | Refills: 6 | Status: DISCONTINUED | COMMUNITY
Start: 2021-08-17 | End: 2021-09-20

## 2021-09-20 NOTE — DISCUSSION/SUMMARY
[FreeTextEntry1] : She is a 31 year old G 1 P 0000 at 31 weeks and 6 days of gestation by LMP dates. \par \par Hypothyroidism in pregnancy  \par She is currently taking levothyroxine 88 mcg. The hypothyroidism is being managed by an endocrinologist. The most recent thyroid function tests were on 9/15/21. The TSH was 1.34, and Free T4 was 1.0 ng/dL.   She was advised to continue having serial thyroid function studies during pregnancy to document that she is euthyroid or adjust her medication.\par \par Obesity\par She is obese (BMI 40.4). Obesity has been associated with a number of maternal complications such as pre-eclampsia, thrombophlebitis, labor abnormalities, post-term pregnancies,  delivery, and operative complications. Obesity has been associated with adverse fetal outcomes such as late stillbirth and  deliveries. \par \par Type 2 diabetes mellitus  \par She was diagnosed with T2DM on 2020 (hemoglobin A1C was  6.5%). The hemoglobin A1c done on September 15, 2021 was 5.5% which corresponds to a daily estimated average glucose of 111 mg/dL.  On 2021 she was prescribed 8 units of Basaglar insulin to be taken at bedtime. She is also taking 84 units of NPH insulin at bedtime.  She understands that fasting glucose values should be < 90 and 1-hour postprandial values should be < 140.  Her glucose fingerstick log book from 21 to 21 was reviewed. Today's fasting glucose was 81. The previous 2 days fasting glucose levels were 95. She had one elevated postprandial glucose due to her dietary choice.  Antepartum fetal surveillance should begin twice weekly at 32 weeks and serial growth ultrasounds should be continued throughout pregnancy.\par \par \par Vaccine counseling\par She has not received the COVID-19 vaccine and had questions regarding getting the vaccine during pregnancy. I told her that I recommend having the COVID-19 vaccine during pregnancy. She was informed that the American College of Obstetricians and Gynecologists (ACOG) and the Society for Maternal Fetal Medicine (SMFM) are recommending that all pregnant women be vaccinated against COVID-19. I discussed the benefits and risks of COVID-19 vaccination. I told her of the reported safety of the COVID-19 vaccines during pregnancy. There is no evidence of adverse maternal or fetal effects from vaccinating pregnant women with COVID-19 vaccine. I discussed the current low vaccination rates and the concerning increase in cases. The data has shown that COVID-19 infection puts pregnant women at increased risk of severe complications and death. More than 95% of hospitalized and/or dying individuals from COVID-19 are unvaccinated. Vaccines are the best chance there is to save lives and end the pandemic. Maternal vaccination may also provide some level of protection to the  through the placental transfer of maternal antibodies. I told her that mild side effects such as pain at the site of injection, fever, muscle pain, joint pain, headaches, fatigue, and other symptoms may be present after vaccination. Tylenol (acetaminophen) is recommended for pregnant women who experience fever or other side effects. These side effects are a normal part of the body´s reaction to the vaccine and the developing of antibodies to protect against the COVID-19 illness. None of the COVID-19 vaccines available in the [place] cause infertility. The [employer] COVID-19 vaccine has a small risk for thrombocytopenia syndrome (TTS) and Guillain-Barré syndrome. Therefore, I believe that vaccination with the mRNA vaccines is preferable. There are rare allergic reactions (including anaphylaxis), 4.7 per million for Pf[employer] and 2.5 per million for Moderna following COVID-19 vaccination in nonpregnant individuals. Management of anaphylaxis in pregnant individuals is the same as in nonpregnant individuals. She told me that she is going to request to have the COVID 19 vaccine.\par \par Recommendations: \par -Continue levothyroxine/Synthroid 88 mcg daily \par -Continue bedtime Humulin NPH insulin at 84 units \par -Continue bedtime Basaglar insulin at 8 units \par -Follow-up ultrasound examination in 4 weeks\par -Follow up Maternal-Fetal Medicine consultation in 4 weeks \par - testing twice weekly  \par -COVID 19 vaccination\par \par \par \par

## 2021-09-20 NOTE — PAST MEDICAL HISTORY
[Exposure To Gonorrhea] : no gonorrhea [HIV Infection] : no HIV [Chlamydial Infections] : no chlamydia [Herpes Simplex] : no genital herpes [Syphilis] : no syphilis [Human Papilloma Virus Infection] : no genital warts [Hepatitis, C Virus] : no Hepatitis C [Hepatitis, B Virus] : no Hepatitis B [Trichomoniasis] : no trichomoniasis

## 2021-09-20 NOTE — OB HISTORY
[LMP: ___] : LMP: [unfilled] [RICHARD: ___] : RICHARD: [unfilled] [EGA: ___ wks] : EGA: [unfilled] wks [Spontaneous] : Spontaneous conception [Sonogram] : sonogram [at ___ wks] : at [unfilled] weeks [Definite:  ___ (Date)] : the last menstrual period was [unfilled] [Normal Amount/Duration] : was of a normal amount and duration [Regular Cycle Intervals] : periods have been regular [Frequency: Q ___ days] : menstrual periods occur approximately every [unfilled] days [Menarche Age: ____] : age at menarche was [unfilled] [FreeTextEntry1] : Her first prenatal visit was April 23, 2021. [Spotting Between  Menses] : no spotting between menses [Menstrual Cramps] : no menstrual cramps [On BCP at conception] : the patient was not on BCP at conception

## 2021-09-20 NOTE — ACTIVE PROBLEMS
[Hypertension] : no hypertension [Autoimmune Disease] : no autoimmune disease [Heart Disease] : no heart disease [Renal Disease] : no kidney disease, no UTI [Neurologic Disorder] : no neurologic disorder, no epilepsy [Depression] : no depression, no post partum depression [Psychiatric Disorders] : no psychiatric disorders [Hepatic Disorder] : no hepatitis, no liver disease [Thrombophlebitis] : no varicosities, no phlebitis [Trauma] : no trauma/violence [Blood Transfusion (___ Ml)] : no history of blood transfusion

## 2021-09-20 NOTE — CHIEF COMPLAINT
[G ___] : G [unfilled] [P ___] : P [unfilled] [de-identified] : Type 2 diabetes, and hypothyroidism  RD remains available, re-consult as needed. Kwadwo Leigh RD Pager #[phone]

## 2021-09-21 ENCOUNTER — NON-APPOINTMENT (OUTPATIENT)
Age: 31
End: 2021-09-21

## 2021-09-22 ENCOUNTER — NON-APPOINTMENT (OUTPATIENT)
Age: 31
End: 2021-09-22

## 2021-09-27 ENCOUNTER — APPOINTMENT (OUTPATIENT)
Dept: ANTEPARTUM | Facility: CLINIC | Age: 31
End: 2021-09-27

## 2021-09-30 ENCOUNTER — ASOB RESULT (OUTPATIENT)
Age: 31
End: 2021-09-30

## 2021-09-30 ENCOUNTER — APPOINTMENT (OUTPATIENT)
Dept: ANTEPARTUM | Facility: CLINIC | Age: 31
End: 2021-09-30
Payer: MEDICAID

## 2021-09-30 PROCEDURE — 76820 UMBILICAL ARTERY ECHO: CPT

## 2021-09-30 PROCEDURE — 76818 FETAL BIOPHYS PROFILE W/NST: CPT

## 2021-10-04 ENCOUNTER — APPOINTMENT (OUTPATIENT)
Dept: ANTEPARTUM | Facility: CLINIC | Age: 31
End: 2021-10-04

## 2021-10-04 ENCOUNTER — APPOINTMENT (OUTPATIENT)
Dept: OBGYN | Facility: CLINIC | Age: 31
End: 2021-10-04
Payer: MEDICAID

## 2021-10-04 ENCOUNTER — ASOB RESULT (OUTPATIENT)
Age: 31
End: 2021-10-04

## 2021-10-04 VITALS
HEIGHT: 66 IN | BODY MASS INDEX: 40.4 KG/M2 | DIASTOLIC BLOOD PRESSURE: 62 MMHG | TEMPERATURE: 96.8 F | WEIGHT: 251.38 LBS | SYSTOLIC BLOOD PRESSURE: 108 MMHG

## 2021-10-04 LAB
BILIRUB UR QL STRIP: NORMAL
GLUCOSE UR-MCNC: NORMAL
HCG UR QL: 0.2 EU/DL
HGB UR QL STRIP.AUTO: NORMAL
KETONES UR-MCNC: NORMAL
LEUKOCYTE ESTERASE UR QL STRIP: NORMAL
NITRITE UR QL STRIP: NORMAL
PH UR STRIP: 6.5
PROT UR STRIP-MCNC: NORMAL
SP GR UR STRIP: 1.02

## 2021-10-04 PROCEDURE — 0502F SUBSEQUENT PRENATAL CARE: CPT

## 2021-10-04 PROCEDURE — 76818 FETAL BIOPHYS PROFILE W/NST: CPT

## 2021-10-05 ENCOUNTER — APPOINTMENT (OUTPATIENT)
Dept: OBGYN | Facility: CLINIC | Age: 31
End: 2021-10-05

## 2021-10-06 LAB
APPEARANCE: CLEAR
BACTERIA: NEGATIVE
BILIRUBIN URINE: NEGATIVE
BLOOD URINE: NEGATIVE
CANDIDA VAG CYTO: NOT DETECTED
COLOR: YELLOW
G VAGINALIS+PREV SP MTYP VAG QL MICRO: NOT DETECTED
GLUCOSE QUALITATIVE U: NEGATIVE
HYALINE CASTS: 0 /LPF
KETONES URINE: NEGATIVE
LEUKOCYTE ESTERASE URINE: NEGATIVE
MICROSCOPIC-UA: NORMAL
NITRITE URINE: NEGATIVE
PH URINE: 6.5
PROTEIN URINE: NORMAL
RED BLOOD CELLS URINE: 2 /HPF
SPECIFIC GRAVITY URINE: 1.02
SQUAMOUS EPITHELIAL CELLS: 7 /HPF
T VAGINALIS VAG QL WET PREP: NOT DETECTED
UROBILINOGEN URINE: NORMAL
WHITE BLOOD CELLS URINE: 4 /HPF

## 2021-10-07 ENCOUNTER — APPOINTMENT (OUTPATIENT)
Dept: ANTEPARTUM | Facility: CLINIC | Age: 31
End: 2021-10-07
Payer: MEDICAID

## 2021-10-07 ENCOUNTER — ASOB RESULT (OUTPATIENT)
Age: 31
End: 2021-10-07

## 2021-10-07 LAB — BACTERIA UR CULT: NORMAL

## 2021-10-07 PROCEDURE — 76818 FETAL BIOPHYS PROFILE W/NST: CPT

## 2021-10-08 ENCOUNTER — APPOINTMENT (OUTPATIENT)
Dept: MATERNAL FETAL MEDICINE | Facility: CLINIC | Age: 31
End: 2021-10-08
Payer: MEDICAID

## 2021-10-08 ENCOUNTER — ASOB RESULT (OUTPATIENT)
Age: 31
End: 2021-10-08

## 2021-10-08 VITALS — BODY MASS INDEX: 40.66 KG/M2 | HEIGHT: 66 IN | WEIGHT: 253 LBS

## 2021-10-08 PROCEDURE — G0108 DIAB MANAGE TRN  PER INDIV: CPT | Mod: 95

## 2021-10-11 ENCOUNTER — APPOINTMENT (OUTPATIENT)
Dept: OBGYN | Facility: CLINIC | Age: 31
End: 2021-10-11
Payer: MEDICAID

## 2021-10-11 ENCOUNTER — NON-APPOINTMENT (OUTPATIENT)
Age: 31
End: 2021-10-11

## 2021-10-11 ENCOUNTER — ASOB RESULT (OUTPATIENT)
Age: 31
End: 2021-10-11

## 2021-10-11 VITALS
DIASTOLIC BLOOD PRESSURE: 70 MMHG | HEIGHT: 66 IN | WEIGHT: 256 LBS | BODY MASS INDEX: 41.14 KG/M2 | SYSTOLIC BLOOD PRESSURE: 118 MMHG

## 2021-10-11 PROCEDURE — 0502F SUBSEQUENT PRENATAL CARE: CPT

## 2021-10-11 PROCEDURE — 76818 FETAL BIOPHYS PROFILE W/NST: CPT

## 2021-10-12 ENCOUNTER — LABORATORY RESULT (OUTPATIENT)
Age: 31
End: 2021-10-12

## 2021-10-12 ENCOUNTER — TRANSCRIPTION ENCOUNTER (OUTPATIENT)
Age: 31
End: 2021-10-12

## 2021-10-14 ENCOUNTER — APPOINTMENT (OUTPATIENT)
Dept: ANTEPARTUM | Facility: CLINIC | Age: 31
End: 2021-10-14
Payer: MEDICAID

## 2021-10-14 ENCOUNTER — ASOB RESULT (OUTPATIENT)
Age: 31
End: 2021-10-14

## 2021-10-14 PROCEDURE — 76820 UMBILICAL ARTERY ECHO: CPT

## 2021-10-14 PROCEDURE — 76818 FETAL BIOPHYS PROFILE W/NST: CPT

## 2021-10-14 PROCEDURE — 93976 VASCULAR STUDY: CPT

## 2021-10-18 ENCOUNTER — APPOINTMENT (OUTPATIENT)
Dept: ANTEPARTUM | Facility: CLINIC | Age: 31
End: 2021-10-18

## 2021-10-18 ENCOUNTER — APPOINTMENT (OUTPATIENT)
Dept: OBGYN | Facility: CLINIC | Age: 31
End: 2021-10-18
Payer: MEDICAID

## 2021-10-18 ENCOUNTER — APPOINTMENT (OUTPATIENT)
Dept: OBGYN | Facility: CLINIC | Age: 31
End: 2021-10-18

## 2021-10-18 ENCOUNTER — ASOB RESULT (OUTPATIENT)
Age: 31
End: 2021-10-18

## 2021-10-18 ENCOUNTER — APPOINTMENT (OUTPATIENT)
Dept: MATERNAL FETAL MEDICINE | Facility: CLINIC | Age: 31
End: 2021-10-18
Payer: MEDICAID

## 2021-10-18 VITALS
SYSTOLIC BLOOD PRESSURE: 118 MMHG | BODY MASS INDEX: 41.46 KG/M2 | WEIGHT: 258 LBS | DIASTOLIC BLOOD PRESSURE: 66 MMHG | HEART RATE: 97 BPM | RESPIRATION RATE: 18 BRPM | OXYGEN SATURATION: 99 % | HEIGHT: 66 IN

## 2021-10-18 VITALS
HEART RATE: 97 BPM | BODY MASS INDEX: 41.46 KG/M2 | SYSTOLIC BLOOD PRESSURE: 118 MMHG | WEIGHT: 258 LBS | DIASTOLIC BLOOD PRESSURE: 66 MMHG | RESPIRATION RATE: 18 BRPM | OXYGEN SATURATION: 99 % | HEIGHT: 66 IN

## 2021-10-18 VITALS
SYSTOLIC BLOOD PRESSURE: 120 MMHG | BODY MASS INDEX: 41.78 KG/M2 | HEIGHT: 66 IN | WEIGHT: 260 LBS | DIASTOLIC BLOOD PRESSURE: 70 MMHG

## 2021-10-18 DIAGNOSIS — O35.8XX0 MATERNAL CARE FOR OTHER (SUSPECTED) FETAL ABNORMALITY AND DAMAGE, NOT APPLICABLE OR UNSPECIFIED: ICD-10-CM

## 2021-10-18 DIAGNOSIS — Z71.85 ENCOUNTER FOR IMMUNIZATION SAFETY COUNSELING: ICD-10-CM

## 2021-10-18 LAB
BILIRUB UR QL STRIP: NORMAL
GLUCOSE UR-MCNC: NORMAL
HCG UR QL: 0.2 EU/DL
HGB UR QL STRIP.AUTO: NORMAL
KETONES UR-MCNC: NORMAL
LEUKOCYTE ESTERASE UR QL STRIP: NORMAL
NITRITE UR QL STRIP: NORMAL
PH UR STRIP: 6
PROT UR STRIP-MCNC: NORMAL
SP GR UR STRIP: 1.02

## 2021-10-18 PROCEDURE — 90471 IMMUNIZATION ADMIN: CPT

## 2021-10-18 PROCEDURE — 76819 FETAL BIOPHYS PROFIL W/O NST: CPT

## 2021-10-18 PROCEDURE — 99215 OFFICE O/P EST HI 40 MIN: CPT

## 2021-10-18 PROCEDURE — 90686 IIV4 VACC NO PRSV 0.5 ML IM: CPT

## 2021-10-18 PROCEDURE — 0502F SUBSEQUENT PRENATAL CARE: CPT

## 2021-10-18 RX ORDER — BLOOD SUGAR DIAGNOSTIC
STRIP MISCELLANEOUS
Qty: 100 | Refills: 3 | Status: ACTIVE | COMMUNITY
Start: 2020-01-23 | End: 1900-01-01

## 2021-10-18 NOTE — DISCUSSION/SUMMARY
[FreeTextEntry1] : She is a 31 year old G 1 P 0000 at 35 weeks and 6 days of gestation by LMP dates. \par \par Hypothyroidism in pregnancy: \par She is currently taking levothyroxine 88 mcg. The hypothyroidism is being managed by an endocrinologist. The most recent thyroid function test was on 10/12/21. The TSH level was 1.33. I again told her that pregnancies complicated by hypothyroidism are at an increased risk for stillbirths. \par \par Obesity:\par She is obese (BMI 41.6). Obesity has been associated with a number of maternal complications such as late stillbirth, pre-eclampsia, thrombophlebitis, labor abnormalities, post-term pregnancies,  delivery, and operative complications. \par \par Type 2 diabetes mellitus:  \par She was diagnosed with T2DM on 2020 (hemoglobin A1C was  6.5%). The hemoglobin A1c done on September 15, 2021 was 5.5% which corresponds to a daily estimated average glucose of 111 mg/dL.  She is taking 8 units of Basaglar insulin at bedtime and 84 units of NPH insulin at bedtime. She understands that fasting glucose values should be < 90 and 1-hour postprandial values should be < 140. I reviewed the glucose fingerstick log book from 10/8/21 to 10/18/21. She had 3/10 elevated fasting glucose readings. She had one elevated postprandial glucose due to her dietary choice. I gave her dietary counseling. She is having twice weekly fetal testing. She wants to discuss the timing of delivery. I told her that pregnancies complicated by type 2 diabetes are at risk for adverse pregnancy outcomes such as stillbirth. She has hypothyroidism and obesity which are additional risk factors for stillbirths. Therefore, an early term delivery is recommended between 38 0/7 and 38 6/7 weeks of gestation (ACOG Committee Opinion No. 818, 2021).\par \par Vaccine counseling:\par She told me that she has taken the COVID 19 vaccine on 21 and 10/13/21. \par \par Recommendations: \par -Continue levothyroxine/Synthroid 88 mcg daily \par -Continue bedtime Humulin NPH insulin at 84 units \par -Continue bedtime Basaglar insulin at 8 units \par -Continue  testing twice weekly  \par -Delivery between 38 0/7 and 38 6/7 weeks\par \par

## 2021-10-18 NOTE — VITALS
[LMP (date): ___] : LMP was on [unfilled] [GA= ___ Days] : and [unfilled] day(s) [RICHARD by LMP (date): ___] : The calculated RICHARD by LMP is [unfilled] [By LMP] : this is the final RICHARD [GA =___ Weeks] : which calculates to a GA of [unfilled] weeks

## 2021-10-19 ENCOUNTER — NON-APPOINTMENT (OUTPATIENT)
Age: 31
End: 2021-10-19

## 2021-10-21 ENCOUNTER — APPOINTMENT (OUTPATIENT)
Dept: ANTEPARTUM | Facility: CLINIC | Age: 31
End: 2021-10-21
Payer: MEDICAID

## 2021-10-21 PROCEDURE — 76818 FETAL BIOPHYS PROFILE W/NST: CPT

## 2021-10-21 PROCEDURE — 93976 VASCULAR STUDY: CPT

## 2021-10-21 PROCEDURE — 76816 OB US FOLLOW-UP PER FETUS: CPT

## 2021-10-21 PROCEDURE — 76820 UMBILICAL ARTERY ECHO: CPT

## 2021-10-25 ENCOUNTER — APPOINTMENT (OUTPATIENT)
Dept: OBGYN | Facility: CLINIC | Age: 31
End: 2021-10-25
Payer: MEDICAID

## 2021-10-25 ENCOUNTER — APPOINTMENT (OUTPATIENT)
Dept: ENDOCRINOLOGY | Facility: CLINIC | Age: 31
End: 2021-10-25
Payer: MEDICAID

## 2021-10-25 ENCOUNTER — INPATIENT (INPATIENT)
Facility: HOSPITAL | Age: 31
LOS: 3 days | Discharge: HOME | DRG: 831 | End: 2021-10-29
Attending: OBSTETRICS & GYNECOLOGY | Admitting: OBSTETRICS & GYNECOLOGY
Payer: MEDICAID

## 2021-10-25 VITALS
DIASTOLIC BLOOD PRESSURE: 70 MMHG | HEIGHT: 66 IN | WEIGHT: 259.38 LBS | SYSTOLIC BLOOD PRESSURE: 110 MMHG | BODY MASS INDEX: 41.68 KG/M2

## 2021-10-25 VITALS — SYSTOLIC BLOOD PRESSURE: 134 MMHG | HEART RATE: 103 BPM | DIASTOLIC BLOOD PRESSURE: 60 MMHG

## 2021-10-25 VITALS
WEIGHT: 257 LBS | BODY MASS INDEX: 41.3 KG/M2 | HEART RATE: 88 BPM | OXYGEN SATURATION: 98 % | DIASTOLIC BLOOD PRESSURE: 70 MMHG | SYSTOLIC BLOOD PRESSURE: 118 MMHG | HEIGHT: 66 IN

## 2021-10-25 DIAGNOSIS — O47.1 FALSE LABOR AT OR AFTER 37 COMPLETED WEEKS OF GESTATION: ICD-10-CM

## 2021-10-25 LAB
B-HEM STREP SPEC QL CULT: NORMAL
BILIRUB UR QL STRIP: NORMAL
COLLECTION METHOD: NORMAL
GLUCOSE UR-MCNC: NORMAL
HCG UR QL: 0.2 EU/DL
HGB UR QL STRIP.AUTO: NORMAL
HIV1+2 AB SPEC QL IA.RAPID: NONREACTIVE
KETONES UR-MCNC: NORMAL
LEUKOCYTE ESTERASE UR QL STRIP: ABNORMAL
NITRITE UR QL STRIP: NORMAL
PH UR STRIP: 7
PROT UR STRIP-MCNC: NORMAL
SP GR UR STRIP: 1.01

## 2021-10-25 PROCEDURE — 0502F SUBSEQUENT PRENATAL CARE: CPT

## 2021-10-25 PROCEDURE — 99214 OFFICE O/P EST MOD 30 MIN: CPT

## 2021-10-25 PROCEDURE — 59025 FETAL NON-STRESS TEST: CPT

## 2021-10-25 RX ORDER — FAMOTIDINE 10 MG/ML
20 INJECTION INTRAVENOUS ONCE
Refills: 0 | Status: COMPLETED | OUTPATIENT
Start: 2021-10-25 | End: 2021-10-25

## 2021-10-25 RX ORDER — ACETAMINOPHEN 500 MG
650 TABLET ORAL ONCE
Refills: 0 | Status: COMPLETED | OUTPATIENT
Start: 2021-10-25 | End: 2021-10-25

## 2021-10-25 RX ORDER — CITRIC ACID/SODIUM CITRATE 300-500 MG
30 SOLUTION, ORAL ORAL ONCE
Refills: 0 | Status: COMPLETED | OUTPATIENT
Start: 2021-10-25 | End: 2021-10-25

## 2021-10-25 RX ORDER — SODIUM CHLORIDE 9 MG/ML
1000 INJECTION, SOLUTION INTRAVENOUS ONCE
Refills: 0 | Status: COMPLETED | OUTPATIENT
Start: 2021-10-25 | End: 2021-10-25

## 2021-10-25 RX ORDER — ONDANSETRON 8 MG/1
4 TABLET, FILM COATED ORAL ONCE
Refills: 0 | Status: COMPLETED | OUTPATIENT
Start: 2021-10-25 | End: 2021-10-26

## 2021-10-25 NOTE — PHYSICAL EXAM
[Alert] : alert [Obese] : obese [No Acute Distress] : no acute distress [Normal Sclera/Conjunctiva] : normal sclera/conjunctiva [EOMI] : extra ocular movement intact [No LAD] : no lymphadenopathy [No Thyroid Nodules] : no palpable thyroid nodules [No Respiratory Distress] : no respiratory distress [Normal Rate and Effort] : normal respiratory rate and effort [Clear to Auscultation] : lungs were clear to auscultation bilaterally [Normal S1, S2] : normal S1 and S2 [Normal Rate] : heart rate was normal [Regular Rhythm] : with a regular rhythm [No Edema] : no peripheral edema [Normal Bowel Sounds] : normal bowel sounds [Not Tender] : non-tender [Soft] : abdomen soft [No Stigmata of Cushings Syndrome] : no stigmata of Cushings Syndrome [Normal Gait] : normal gait [No Rash] : no rash [Acanthosis Nigricans] : no acanthosis nigricans [No Motor Deficits] : the motor exam was normal [No Tremors] : no tremors [Oriented x3] : oriented to person, place, and time [Normal Insight/Judgement] : insight and judgment were intact [Normal Mood] : the mood was normal [de-identified] : bulky uniformly enlarged

## 2021-10-25 NOTE — OB PROVIDER TRIAGE NOTE - HISTORY OF PRESENT ILLNESS
JOSEP CARRANZA is a 31y  at 36w6d GA who presents to L&D because she's been having n/v, malaise, gas pains, and diarrhea all day. Pt states that she has vomited multiple times and has been feeling unusually tired today, taking multiple naps, which is abnormal for her. She also endorses gas pains and chest discomfort, noting that the pain is mostly epigastric and RUQ. She states she's been burping and passing gas excessively today. She has not taken any medications for her discomfort. She denies any blood in her stool or urine, denies vaginal bleeding. Endorses good fetal movement. Notes she had an NST today, which was reactive. Denies ctx, loss of fluid. Pt denies headaches, visual disturbances. She denies any urinary complaints. She denies fevers, chills. Denies sick contacts.     LMP: 21  RICHARD 21, scheduled for IOL on 21    Pregnancy course is significant for:  GDMA2 on humulin 86u daily, basiglar 10u qhs  hypothyroidism on synthroid 80mcg    POB: denies  PGYN: PCOS  PMH: hypothyroidism  PSH: wisdom teeth, ovarian scraping  Meds: synthroid, humulin, basiglar  All: chloroseptic spray - SOB JOSEP CARRANZA is a 31y  at 36w6d GA who presents to L&D because she's been having n/v, malaise, gas pains, and diarrhea all day. Pt states that she has vomited multiple times and has been feeling unusually tired today, taking multiple naps, which is abnormal for her. She also endorses gas pains and chest discomfort, noting that the pain is mostly epigastric and RUQ. She states she's been burping and passing gas excessively today. She has not taken any medications for her discomfort. She denies any blood in her stool or urine, denies vaginal bleeding. Endorses good fetal movement. Notes she had an NST today, which was reactive. Denies ctx, loss of fluid. Pt denies headaches, visual disturbances. She denies any urinary complaints. She denies fevers, chills. Denies sick contacts.     LMP: 21  RICHARD 21, scheduled for IOL on 21    Pregnancy course is significant for:  DM2 on humulin 86u daily, basiglar 10u qhs  hypothyroidism on synthroid 88mcg    POB: denies  PGYN: PCOS  PMH: hypothyroidism, DM2  PSH: wisdom teeth, ovarian scraping  Meds: synthroid, humulin, basiglar, baby ASA  All: chloroseptic spray - SOB

## 2021-10-25 NOTE — HISTORY OF PRESENT ILLNESS
[FreeTextEntry1] : Quality: Hypothyroidism\par Severity: moderate \par Duration: over 4 years ago \par Onset: weight gain, fatigue \par Modifying Factors: Better with medication \par Associated Symptoms: no neck pain, dysphonia, or dysphagia \par Family History: diabetes - "all women in my family"\par \par Notes: Obstetrician: [facility] \par Currently 28 weeks gestation (boy Jay)\par EDC 11/9/21- induced 11/9/21\par \par \par Current Regimen: Levothyroxine 88 mcg daily - taking appropriately, waits an hour to eat or drink anything \par \par Labs: \par 10/12/21\par TSH 1.3\par \par \par Labs reviewed: A1C 5.5\par \par Date of last thyroid sonogram: 1/7/20 Impression: Heterogenous in appearance without focal nodules. The right lobe is normal in size and the left lobe is mildly prominent size. Borderline enlarged benign appearing level II lymph node see in each side of neck. \par \par Type 2 DM (now Prediabetes): dx with GDM - insulin controlled and is followed by MFM. Her fasting blood sugars have been in the 80s. Currently on insulin due to GDM. \par \par PCOS: pregnant \par \par Chronic Obesity \par Weight: gained 25 pounds since pregnancy \par

## 2021-10-25 NOTE — OB PROVIDER TRIAGE NOTE - NSOBPROVIDERNOTE_OBGYN_ALL_OB_FT
JOSEP CARRANZA is a 31y  at 36w6d GA evaluated for pylo vs gastroenteritis vs GERD.    A/P:   1L LR bolus  CBC, CMP, UA pending  pepcid, bicitra, zofran, tylenol given for symptomatic relief  Fetus: FHT reactive  [place]: no ctx noted    Dispo: Continue to observe.     Discussed with Dr. Kunz

## 2021-10-25 NOTE — OB PROVIDER TRIAGE NOTE - NSHPPHYSICALEXAM_GEN_ALL_CORE
T(C): 36.6 (10-25-21 @ 22:43), Max: 36.6 (10-25-21 @ 22:43)  HR: 103 (10-25-21 @ 22:43) (103 - 103)  BP: 134/60 (10-25-21 @ 22:43) (134/60 - 134/60)  RR: 16 (10-25-21 @ 22:43) (16 - 16)    Gen: in apparent discomfort  Chest: breathing comfortably on RA, tender to pressure  Abd: diffusely mildly tender, more severe in upper quadrants  Renal: b/l CVA tenderness    FHT: baseline 145, mod variability, +accels, -decels   [place]: no ctx noted

## 2021-10-25 NOTE — ASSESSMENT
[FreeTextEntry1] : 30 y/o obese female with Hypothyroidism, Type 2 DM (now prediabetes), and PCOS.\par \par Plan: \par Hypothyroidism: euthyroid on replacement \par - TSH goal < 2.5 in pregnancy \par - continue current dose of LT4\par - repeat Thyroid sonogram in 1/2022\par - repeat TFTs every 4-6 weeks during pregnancy \par \par Type 2 DM (now prediabetes): now has GDM and is being treated by MFM\par - continue to follow up with MFM \par \par PCOS: pregnant \par \par Obesity: educated on healthy food choices\par - encouraged to continue routine exercise 150 minutes a week \par \par RTO in 8 weeks  [Levothyroxine] : The patient was instructed to take Levothyroxine on an empty stomach, separate from vitamins, and wait at least 30 minutes before eating

## 2021-10-25 NOTE — REVIEW OF SYSTEMS
[Fatigue] : fatigue [Recent Weight Gain (___ Lbs)] : recent weight gain: [unfilled] lbs [Decreased Appetite] : appetite not decreased [Visual Field Defect] : no visual field defect [Blurred Vision] : no blurred vision [Dysphagia] : no dysphagia [Neck Pain] : no neck pain [Dysphonia] : no dysphonia [Chest Pain] : no chest pain [Palpitations] : no palpitations [Constipation] : no constipation [Diarrhea] : no diarrhea [Polyuria] : no polyuria [Dysuria] : no dysuria [Dry Skin] : no dry skin [Hair Loss] : no hair loss [Headaches] : no headaches [Tremors] : no tremors [Depression] : no depression [Anxiety] : no anxiety [Polydipsia] : no polydipsia

## 2021-10-26 ENCOUNTER — NON-APPOINTMENT (OUTPATIENT)
Age: 31
End: 2021-10-26

## 2021-10-26 DIAGNOSIS — O99.213 OBESITY COMPLICATING PREGNANCY, THIRD TRIMESTER: ICD-10-CM

## 2021-10-26 DIAGNOSIS — O99.283 ENDOCRINE, NUTRITIONAL AND METABOLIC DISEASES COMPLICATING PREGNANCY, THIRD TRIMESTER: ICD-10-CM

## 2021-10-26 DIAGNOSIS — K85.90 ACUTE PANCREATITIS WITHOUT NECROSIS OR INFECTION, UNSPECIFIED: ICD-10-CM

## 2021-10-26 DIAGNOSIS — O24.913 UNSPECIFIED DIABETES MELLITUS IN PREGNANCY, THIRD TRIMESTER: ICD-10-CM

## 2021-10-26 DIAGNOSIS — Z3A.37 37 WEEKS GESTATION OF PREGNANCY: ICD-10-CM

## 2021-10-26 DIAGNOSIS — Z29.9 ENCOUNTER FOR PROPHYLACTIC MEASURES, UNSPECIFIED: ICD-10-CM

## 2021-10-26 LAB
A1C WITH ESTIMATED AVERAGE GLUCOSE RESULT: 5.7 % — HIGH (ref 4–5.6)
ALBUMIN SERPL ELPH-MCNC: 2.9 G/DL — LOW (ref 3.3–5.2)
ALBUMIN SERPL ELPH-MCNC: 3.3 G/DL — SIGNIFICANT CHANGE UP (ref 3.3–5.2)
ALBUMIN SERPL ELPH-MCNC: 3.7 G/DL — SIGNIFICANT CHANGE UP (ref 3.3–5.2)
ALP SERPL-CCNC: 107 U/L — SIGNIFICANT CHANGE UP (ref 40–120)
ALP SERPL-CCNC: 118 U/L — SIGNIFICANT CHANGE UP (ref 40–120)
ALP SERPL-CCNC: 132 U/L — HIGH (ref 40–120)
ALT FLD-CCNC: 27 U/L — SIGNIFICANT CHANGE UP
ALT FLD-CCNC: 30 U/L — SIGNIFICANT CHANGE UP
ALT FLD-CCNC: 37 U/L — HIGH
AMYLASE P1 CFR SERPL: 2120 U/L — HIGH (ref 36–128)
AMYLASE P1 CFR SERPL: 603 U/L — HIGH (ref 36–128)
AMYLASE P1 CFR SERPL: 928 U/L — HIGH (ref 36–128)
ANION GAP SERPL CALC-SCNC: 12 MMOL/L — SIGNIFICANT CHANGE UP (ref 5–17)
ANION GAP SERPL CALC-SCNC: 16 MMOL/L — SIGNIFICANT CHANGE UP (ref 5–17)
ANION GAP SERPL CALC-SCNC: 17 MMOL/L — SIGNIFICANT CHANGE UP (ref 5–17)
APPEARANCE UR: ABNORMAL
APPEARANCE UR: CLEAR — SIGNIFICANT CHANGE UP
AST SERPL-CCNC: 33 U/L — HIGH
AST SERPL-CCNC: 39 U/L — HIGH
AST SERPL-CCNC: 61 U/L — HIGH
BACTERIA # UR AUTO: ABNORMAL
BASOPHILS # BLD AUTO: 0.02 K/UL — SIGNIFICANT CHANGE UP (ref 0–0.2)
BASOPHILS NFR BLD AUTO: 0.2 % — SIGNIFICANT CHANGE UP (ref 0–2)
BILIRUB SERPL-MCNC: 0.4 MG/DL — SIGNIFICANT CHANGE UP (ref 0.4–2)
BILIRUB SERPL-MCNC: 0.6 MG/DL — SIGNIFICANT CHANGE UP (ref 0.4–2)
BILIRUB SERPL-MCNC: 0.6 MG/DL — SIGNIFICANT CHANGE UP (ref 0.4–2)
BILIRUB UR-MCNC: ABNORMAL
BILIRUB UR-MCNC: NEGATIVE — SIGNIFICANT CHANGE UP
BLD GP AB SCN SERPL QL: SIGNIFICANT CHANGE UP
BUN SERPL-MCNC: 10 MG/DL — SIGNIFICANT CHANGE UP (ref 8–20)
BUN SERPL-MCNC: 9.4 MG/DL — SIGNIFICANT CHANGE UP (ref 8–20)
BUN SERPL-MCNC: 9.7 MG/DL — SIGNIFICANT CHANGE UP (ref 8–20)
CALCIUM SERPL-MCNC: 7.7 MG/DL — LOW (ref 8.6–10.2)
CALCIUM SERPL-MCNC: 8.1 MG/DL — LOW (ref 8.6–10.2)
CALCIUM SERPL-MCNC: 8.3 MG/DL — LOW (ref 8.6–10.2)
CHLORIDE SERPL-SCNC: 101 MMOL/L — SIGNIFICANT CHANGE UP (ref 98–107)
CHLORIDE SERPL-SCNC: 102 MMOL/L — SIGNIFICANT CHANGE UP (ref 98–107)
CHLORIDE SERPL-SCNC: 107 MMOL/L — SIGNIFICANT CHANGE UP (ref 98–107)
CO2 SERPL-SCNC: 17 MMOL/L — LOW (ref 22–29)
CO2 SERPL-SCNC: 17 MMOL/L — LOW (ref 22–29)
CO2 SERPL-SCNC: 18 MMOL/L — LOW (ref 22–29)
COLOR SPEC: YELLOW — SIGNIFICANT CHANGE UP
COLOR SPEC: YELLOW — SIGNIFICANT CHANGE UP
COMMENT - URINE: SIGNIFICANT CHANGE UP
COVID-19 SPIKE DOMAIN AB INTERP: POSITIVE
COVID-19 SPIKE DOMAIN ANTIBODY RESULT: >250 U/ML — HIGH
CREAT ?TM UR-MCNC: 238 MG/DL — SIGNIFICANT CHANGE UP
CREAT SERPL-MCNC: 0.41 MG/DL — LOW (ref 0.5–1.3)
CREAT SERPL-MCNC: 0.43 MG/DL — LOW (ref 0.5–1.3)
CREAT SERPL-MCNC: 0.5 MG/DL — SIGNIFICANT CHANGE UP (ref 0.5–1.3)
DIFF PNL FLD: ABNORMAL
DIFF PNL FLD: ABNORMAL
EOSINOPHIL # BLD AUTO: 0.02 K/UL — SIGNIFICANT CHANGE UP (ref 0–0.5)
EOSINOPHIL NFR BLD AUTO: 0.2 % — SIGNIFICANT CHANGE UP (ref 0–6)
EPI CELLS # UR: SIGNIFICANT CHANGE UP
EPI CELLS # UR: SIGNIFICANT CHANGE UP
ESTIMATED AVERAGE GLUCOSE: 117 MG/DL — HIGH (ref 68–114)
GLUCOSE BLDC GLUCOMTR-MCNC: 110 MG/DL — HIGH (ref 70–99)
GLUCOSE BLDC GLUCOMTR-MCNC: 121 MG/DL — HIGH (ref 70–99)
GLUCOSE BLDC GLUCOMTR-MCNC: 85 MG/DL — SIGNIFICANT CHANGE UP (ref 70–99)
GLUCOSE BLDC GLUCOMTR-MCNC: 89 MG/DL — SIGNIFICANT CHANGE UP (ref 70–99)
GLUCOSE BLDC GLUCOMTR-MCNC: 91 MG/DL — SIGNIFICANT CHANGE UP (ref 70–99)
GLUCOSE BLDC GLUCOMTR-MCNC: 92 MG/DL — SIGNIFICANT CHANGE UP (ref 70–99)
GLUCOSE SERPL-MCNC: 113 MG/DL — HIGH (ref 70–99)
GLUCOSE SERPL-MCNC: 117 MG/DL — HIGH (ref 70–99)
GLUCOSE SERPL-MCNC: 82 MG/DL — SIGNIFICANT CHANGE UP (ref 70–99)
GLUCOSE UR QL: NEGATIVE MG/DL — SIGNIFICANT CHANGE UP
GLUCOSE UR QL: NEGATIVE MG/DL — SIGNIFICANT CHANGE UP
HCT VFR BLD CALC: 28.2 % — LOW (ref 34.5–45)
HCT VFR BLD CALC: 29.4 % — LOW (ref 34.5–45)
HCT VFR BLD CALC: 33.8 % — LOW (ref 34.5–45)
HGB BLD-MCNC: 10 G/DL — LOW (ref 11.5–15.5)
HGB BLD-MCNC: 11.7 G/DL — SIGNIFICANT CHANGE UP (ref 11.5–15.5)
HGB BLD-MCNC: 9.5 G/DL — LOW (ref 11.5–15.5)
IMM GRANULOCYTES NFR BLD AUTO: 0.5 % — SIGNIFICANT CHANGE UP (ref 0–1.5)
KETONES UR-MCNC: ABNORMAL
KETONES UR-MCNC: ABNORMAL
LEUKOCYTE ESTERASE UR-ACNC: ABNORMAL
LEUKOCYTE ESTERASE UR-ACNC: ABNORMAL
LIDOCAIN IGE QN: 1313 U/L — HIGH (ref 22–51)
LIDOCAIN IGE QN: 633 U/L — HIGH (ref 22–51)
LIDOCAIN IGE QN: >3000 U/L — HIGH (ref 22–51)
LYMPHOCYTES # BLD AUTO: 1.69 K/UL — SIGNIFICANT CHANGE UP (ref 1–3.3)
LYMPHOCYTES # BLD AUTO: 12.9 % — LOW (ref 13–44)
MCHC RBC-ENTMCNC: 30.6 PG — SIGNIFICANT CHANGE UP (ref 27–34)
MCHC RBC-ENTMCNC: 31.3 PG — SIGNIFICANT CHANGE UP (ref 27–34)
MCHC RBC-ENTMCNC: 31.3 PG — SIGNIFICANT CHANGE UP (ref 27–34)
MCHC RBC-ENTMCNC: 33.7 GM/DL — SIGNIFICANT CHANGE UP (ref 32–36)
MCHC RBC-ENTMCNC: 34 GM/DL — SIGNIFICANT CHANGE UP (ref 32–36)
MCHC RBC-ENTMCNC: 34.6 GM/DL — SIGNIFICANT CHANGE UP (ref 32–36)
MCV RBC AUTO: 88.5 FL — SIGNIFICANT CHANGE UP (ref 80–100)
MCV RBC AUTO: 92.2 FL — SIGNIFICANT CHANGE UP (ref 80–100)
MCV RBC AUTO: 92.8 FL — SIGNIFICANT CHANGE UP (ref 80–100)
MONOCYTES # BLD AUTO: 0.68 K/UL — SIGNIFICANT CHANGE UP (ref 0–0.9)
MONOCYTES NFR BLD AUTO: 5.2 % — SIGNIFICANT CHANGE UP (ref 2–14)
NEUTROPHILS # BLD AUTO: 10.67 K/UL — HIGH (ref 1.8–7.4)
NEUTROPHILS NFR BLD AUTO: 81 % — HIGH (ref 43–77)
NITRITE UR-MCNC: NEGATIVE — SIGNIFICANT CHANGE UP
NITRITE UR-MCNC: NEGATIVE — SIGNIFICANT CHANGE UP
PH UR: 5 — SIGNIFICANT CHANGE UP (ref 5–8)
PH UR: 5 — SIGNIFICANT CHANGE UP (ref 5–8)
PLATELET # BLD AUTO: 205 K/UL — SIGNIFICANT CHANGE UP (ref 150–400)
PLATELET # BLD AUTO: 219 K/UL — SIGNIFICANT CHANGE UP (ref 150–400)
PLATELET # BLD AUTO: 255 K/UL — SIGNIFICANT CHANGE UP (ref 150–400)
POTASSIUM SERPL-MCNC: 3.6 MMOL/L — SIGNIFICANT CHANGE UP (ref 3.5–5.3)
POTASSIUM SERPL-MCNC: 3.7 MMOL/L — SIGNIFICANT CHANGE UP (ref 3.5–5.3)
POTASSIUM SERPL-MCNC: 3.8 MMOL/L — SIGNIFICANT CHANGE UP (ref 3.5–5.3)
POTASSIUM SERPL-SCNC: 3.6 MMOL/L — SIGNIFICANT CHANGE UP (ref 3.5–5.3)
POTASSIUM SERPL-SCNC: 3.7 MMOL/L — SIGNIFICANT CHANGE UP (ref 3.5–5.3)
POTASSIUM SERPL-SCNC: 3.8 MMOL/L — SIGNIFICANT CHANGE UP (ref 3.5–5.3)
PROT ?TM UR-MCNC: 47 MG/DL — HIGH (ref 0–12)
PROT SERPL-MCNC: 5.8 G/DL — LOW (ref 6.6–8.7)
PROT SERPL-MCNC: 5.9 G/DL — LOW (ref 6.6–8.7)
PROT SERPL-MCNC: 7.2 G/DL — SIGNIFICANT CHANGE UP (ref 6.6–8.7)
PROT UR-MCNC: 100 MG/DL
PROT UR-MCNC: 30 MG/DL
PROT/CREAT UR-RTO: 0.2 RATIO — SIGNIFICANT CHANGE UP
RBC # BLD: 3.04 M/UL — LOW (ref 3.8–5.2)
RBC # BLD: 3.19 M/UL — LOW (ref 3.8–5.2)
RBC # BLD: 3.82 M/UL — SIGNIFICANT CHANGE UP (ref 3.8–5.2)
RBC # FLD: 13.1 % — SIGNIFICANT CHANGE UP (ref 10.3–14.5)
RBC # FLD: 13.2 % — SIGNIFICANT CHANGE UP (ref 10.3–14.5)
RBC # FLD: 13.3 % — SIGNIFICANT CHANGE UP (ref 10.3–14.5)
RBC CASTS # UR COMP ASSIST: SIGNIFICANT CHANGE UP /HPF (ref 0–4)
RBC CASTS # UR COMP ASSIST: SIGNIFICANT CHANGE UP /HPF (ref 0–4)
SARS-COV-2 IGG+IGM SERPL QL IA: >250 U/ML — HIGH
SARS-COV-2 IGG+IGM SERPL QL IA: POSITIVE
SARS-COV-2 RNA SPEC QL NAA+PROBE: SIGNIFICANT CHANGE UP
SODIUM SERPL-SCNC: 134 MMOL/L — LOW (ref 135–145)
SODIUM SERPL-SCNC: 136 MMOL/L — SIGNIFICANT CHANGE UP (ref 135–145)
SODIUM SERPL-SCNC: 137 MMOL/L — SIGNIFICANT CHANGE UP (ref 135–145)
SP GR SPEC: 1.02 — SIGNIFICANT CHANGE UP (ref 1.01–1.02)
SP GR SPEC: 1.02 — SIGNIFICANT CHANGE UP (ref 1.01–1.02)
T PALLIDUM AB TITR SER: NEGATIVE — SIGNIFICANT CHANGE UP
TRIGL SERPL-MCNC: 154 MG/DL — HIGH
UROBILINOGEN FLD QL: 4 MG/DL
UROBILINOGEN FLD QL: NEGATIVE MG/DL — SIGNIFICANT CHANGE UP
WBC # BLD: 13.15 K/UL — HIGH (ref 3.8–10.5)
WBC # BLD: 14.29 K/UL — HIGH (ref 3.8–10.5)
WBC # BLD: 18.66 K/UL — HIGH (ref 3.8–10.5)
WBC # FLD AUTO: 13.15 K/UL — HIGH (ref 3.8–10.5)
WBC # FLD AUTO: 14.29 K/UL — HIGH (ref 3.8–10.5)
WBC # FLD AUTO: 18.66 K/UL — HIGH (ref 3.8–10.5)
WBC UR QL: SIGNIFICANT CHANGE UP
WBC UR QL: SIGNIFICANT CHANGE UP

## 2021-10-26 PROCEDURE — 99233 SBSQ HOSP IP/OBS HIGH 50: CPT | Mod: GC

## 2021-10-26 PROCEDURE — 76705 ECHO EXAM OF ABDOMEN: CPT | Mod: 26

## 2021-10-26 PROCEDURE — 93010 ELECTROCARDIOGRAM REPORT: CPT

## 2021-10-26 PROCEDURE — 99221 1ST HOSP IP/OBS SF/LOW 40: CPT

## 2021-10-26 RX ORDER — HYDROMORPHONE HYDROCHLORIDE 2 MG/ML
1 INJECTION INTRAMUSCULAR; INTRAVENOUS; SUBCUTANEOUS EVERY 6 HOURS
Refills: 0 | Status: DISCONTINUED | OUTPATIENT
Start: 2021-10-26 | End: 2021-10-26

## 2021-10-26 RX ORDER — INSULIN LISPRO 100/ML
VIAL (ML) SUBCUTANEOUS EVERY 6 HOURS
Refills: 0 | Status: DISCONTINUED | OUTPATIENT
Start: 2021-10-26 | End: 2021-10-26

## 2021-10-26 RX ORDER — SODIUM CHLORIDE 9 MG/ML
1000 INJECTION INTRAMUSCULAR; INTRAVENOUS; SUBCUTANEOUS
Refills: 0 | Status: DISCONTINUED | OUTPATIENT
Start: 2021-10-26 | End: 2021-10-26

## 2021-10-26 RX ORDER — INSULIN LISPRO 100/ML
VIAL (ML) SUBCUTANEOUS EVERY 4 HOURS
Refills: 0 | Status: DISCONTINUED | OUTPATIENT
Start: 2021-10-26 | End: 2021-10-27

## 2021-10-26 RX ORDER — LEVOTHYROXINE SODIUM 125 MCG
88 TABLET ORAL DAILY
Refills: 0 | Status: DISCONTINUED | OUTPATIENT
Start: 2021-10-26 | End: 2021-10-29

## 2021-10-26 RX ORDER — GLUCAGON INJECTION, SOLUTION 0.5 MG/.1ML
1 INJECTION, SOLUTION SUBCUTANEOUS ONCE
Refills: 0 | Status: DISCONTINUED | OUTPATIENT
Start: 2021-10-26 | End: 2021-10-29

## 2021-10-26 RX ORDER — DEXTROSE 50 % IN WATER 50 %
25 SYRINGE (ML) INTRAVENOUS ONCE
Refills: 0 | Status: DISCONTINUED | OUTPATIENT
Start: 2021-10-26 | End: 2021-10-29

## 2021-10-26 RX ORDER — SODIUM CHLORIDE 9 MG/ML
1000 INJECTION, SOLUTION INTRAVENOUS
Refills: 0 | Status: DISCONTINUED | OUTPATIENT
Start: 2021-10-26 | End: 2021-10-29

## 2021-10-26 RX ORDER — HYDROMORPHONE HYDROCHLORIDE 2 MG/ML
0.5 INJECTION INTRAMUSCULAR; INTRAVENOUS; SUBCUTANEOUS ONCE
Refills: 0 | Status: DISCONTINUED | OUTPATIENT
Start: 2021-10-26 | End: 2021-10-26

## 2021-10-26 RX ORDER — OXYCODONE HYDROCHLORIDE 5 MG/1
5 TABLET ORAL EVERY 4 HOURS
Refills: 0 | Status: DISCONTINUED | OUTPATIENT
Start: 2021-10-26 | End: 2021-10-26

## 2021-10-26 RX ORDER — SODIUM CHLORIDE 9 MG/ML
1000 INJECTION, SOLUTION INTRAVENOUS
Refills: 0 | Status: DISCONTINUED | OUTPATIENT
Start: 2021-10-26 | End: 2021-10-26

## 2021-10-26 RX ORDER — FAMOTIDINE 10 MG/ML
20 INJECTION INTRAVENOUS DAILY
Refills: 0 | Status: DISCONTINUED | OUTPATIENT
Start: 2021-10-26 | End: 2021-10-26

## 2021-10-26 RX ORDER — METOCLOPRAMIDE HCL 10 MG
10 TABLET ORAL ONCE
Refills: 0 | Status: COMPLETED | OUTPATIENT
Start: 2021-10-26 | End: 2021-10-28

## 2021-10-26 RX ORDER — DIPHENHYDRAMINE HCL 50 MG
50 CAPSULE ORAL ONCE
Refills: 0 | Status: COMPLETED | OUTPATIENT
Start: 2021-10-26 | End: 2021-10-26

## 2021-10-26 RX ORDER — DEXTROSE 50 % IN WATER 50 %
15 SYRINGE (ML) INTRAVENOUS ONCE
Refills: 0 | Status: DISCONTINUED | OUTPATIENT
Start: 2021-10-26 | End: 2021-10-29

## 2021-10-26 RX ORDER — HYDROMORPHONE HYDROCHLORIDE 2 MG/ML
1 INJECTION INTRAMUSCULAR; INTRAVENOUS; SUBCUTANEOUS
Refills: 0 | Status: DISCONTINUED | OUTPATIENT
Start: 2021-10-26 | End: 2021-10-27

## 2021-10-26 RX ORDER — HYDROMORPHONE HYDROCHLORIDE 2 MG/ML
1 INJECTION INTRAMUSCULAR; INTRAVENOUS; SUBCUTANEOUS EVERY 4 HOURS
Refills: 0 | Status: DISCONTINUED | OUTPATIENT
Start: 2021-10-26 | End: 2021-10-27

## 2021-10-26 RX ORDER — ONDANSETRON 8 MG/1
4 TABLET, FILM COATED ORAL EVERY 6 HOURS
Refills: 0 | Status: DISCONTINUED | OUTPATIENT
Start: 2021-10-26 | End: 2021-10-29

## 2021-10-26 RX ORDER — SODIUM CHLORIDE 9 MG/ML
1000 INJECTION INTRAMUSCULAR; INTRAVENOUS; SUBCUTANEOUS ONCE
Refills: 0 | Status: COMPLETED | OUTPATIENT
Start: 2021-10-26 | End: 2021-10-26

## 2021-10-26 RX ORDER — FAMOTIDINE 10 MG/ML
20 INJECTION INTRAVENOUS
Refills: 0 | Status: DISCONTINUED | OUTPATIENT
Start: 2021-10-26 | End: 2021-10-29

## 2021-10-26 RX ORDER — SODIUM CHLORIDE 9 MG/ML
1000 INJECTION INTRAMUSCULAR; INTRAVENOUS; SUBCUTANEOUS
Refills: 0 | Status: DISCONTINUED | OUTPATIENT
Start: 2021-10-26 | End: 2021-10-27

## 2021-10-26 RX ORDER — DEXTROSE 50 % IN WATER 50 %
12.5 SYRINGE (ML) INTRAVENOUS ONCE
Refills: 0 | Status: DISCONTINUED | OUTPATIENT
Start: 2021-10-26 | End: 2021-10-29

## 2021-10-26 RX ADMIN — OXYCODONE HYDROCHLORIDE 5 MILLIGRAM(S): 5 TABLET ORAL at 08:32

## 2021-10-26 RX ADMIN — Medication 50 MILLIGRAM(S): at 04:01

## 2021-10-26 RX ADMIN — OXYCODONE HYDROCHLORIDE 5 MILLIGRAM(S): 5 TABLET ORAL at 03:00

## 2021-10-26 RX ADMIN — SODIUM CHLORIDE 1000 MILLILITER(S): 9 INJECTION INTRAMUSCULAR; INTRAVENOUS; SUBCUTANEOUS at 02:32

## 2021-10-26 RX ADMIN — Medication 650 MILLIGRAM(S): at 02:40

## 2021-10-26 RX ADMIN — Medication 88 MICROGRAM(S): at 06:04

## 2021-10-26 RX ADMIN — ONDANSETRON 4 MILLIGRAM(S): 8 TABLET, FILM COATED ORAL at 12:53

## 2021-10-26 RX ADMIN — HYDROMORPHONE HYDROCHLORIDE 1 MILLIGRAM(S): 2 INJECTION INTRAMUSCULAR; INTRAVENOUS; SUBCUTANEOUS at 12:47

## 2021-10-26 RX ADMIN — HYDROMORPHONE HYDROCHLORIDE 1 MILLIGRAM(S): 2 INJECTION INTRAMUSCULAR; INTRAVENOUS; SUBCUTANEOUS at 16:02

## 2021-10-26 RX ADMIN — HYDROMORPHONE HYDROCHLORIDE 1 MILLIGRAM(S): 2 INJECTION INTRAMUSCULAR; INTRAVENOUS; SUBCUTANEOUS at 13:02

## 2021-10-26 RX ADMIN — HYDROMORPHONE HYDROCHLORIDE 0.5 MILLIGRAM(S): 2 INJECTION INTRAMUSCULAR; INTRAVENOUS; SUBCUTANEOUS at 09:14

## 2021-10-26 RX ADMIN — Medication 650 MILLIGRAM(S): at 00:16

## 2021-10-26 RX ADMIN — HYDROMORPHONE HYDROCHLORIDE 1 MILLIGRAM(S): 2 INJECTION INTRAMUSCULAR; INTRAVENOUS; SUBCUTANEOUS at 20:30

## 2021-10-26 RX ADMIN — ONDANSETRON 4 MILLIGRAM(S): 8 TABLET, FILM COATED ORAL at 22:21

## 2021-10-26 RX ADMIN — OXYCODONE HYDROCHLORIDE 5 MILLIGRAM(S): 5 TABLET ORAL at 07:34

## 2021-10-26 RX ADMIN — HYDROMORPHONE HYDROCHLORIDE 0.5 MILLIGRAM(S): 2 INJECTION INTRAMUSCULAR; INTRAVENOUS; SUBCUTANEOUS at 05:42

## 2021-10-26 RX ADMIN — FAMOTIDINE 20 MILLIGRAM(S): 10 INJECTION INTRAVENOUS at 17:41

## 2021-10-26 RX ADMIN — SODIUM CHLORIDE 1000 MILLILITER(S): 9 INJECTION, SOLUTION INTRAVENOUS at 00:17

## 2021-10-26 RX ADMIN — SODIUM CHLORIDE 200 MILLILITER(S): 9 INJECTION INTRAMUSCULAR; INTRAVENOUS; SUBCUTANEOUS at 12:05

## 2021-10-26 RX ADMIN — HYDROMORPHONE HYDROCHLORIDE 0.5 MILLIGRAM(S): 2 INJECTION INTRAMUSCULAR; INTRAVENOUS; SUBCUTANEOUS at 05:06

## 2021-10-26 RX ADMIN — ONDANSETRON 4 MILLIGRAM(S): 8 TABLET, FILM COATED ORAL at 00:17

## 2021-10-26 RX ADMIN — HYDROMORPHONE HYDROCHLORIDE 0.5 MILLIGRAM(S): 2 INJECTION INTRAMUSCULAR; INTRAVENOUS; SUBCUTANEOUS at 08:59

## 2021-10-26 RX ADMIN — HYDROMORPHONE HYDROCHLORIDE 1 MILLIGRAM(S): 2 INJECTION INTRAMUSCULAR; INTRAVENOUS; SUBCUTANEOUS at 16:17

## 2021-10-26 RX ADMIN — FAMOTIDINE 20 MILLIGRAM(S): 10 INJECTION INTRAVENOUS at 00:17

## 2021-10-26 RX ADMIN — SODIUM CHLORIDE 300 MILLILITER(S): 9 INJECTION INTRAMUSCULAR; INTRAVENOUS; SUBCUTANEOUS at 09:19

## 2021-10-26 RX ADMIN — OXYCODONE HYDROCHLORIDE 5 MILLIGRAM(S): 5 TABLET ORAL at 02:33

## 2021-10-26 RX ADMIN — Medication 30 MILLILITER(S): at 00:16

## 2021-10-26 RX ADMIN — SODIUM CHLORIDE 125 MILLILITER(S): 9 INJECTION INTRAMUSCULAR; INTRAVENOUS; SUBCUTANEOUS at 04:01

## 2021-10-26 RX ADMIN — HYDROMORPHONE HYDROCHLORIDE 1 MILLIGRAM(S): 2 INJECTION INTRAMUSCULAR; INTRAVENOUS; SUBCUTANEOUS at 20:17

## 2021-10-26 NOTE — OB PROVIDER H&P - NSHPLABSRESULTS_GEN_ALL_CORE
11.7   18.66 )-----------( 255      ( 26 Oct 2021 00:46 )             33.8   10-26    136  |  102  |  9.4  ----------------------------<  117<H>  3.7   |  17.0<L>  |  0.50    Ca    8.3<L>      26 Oct 2021 00:46    TPro  7.2  /  Alb  3.7  /  TBili  0.6  /  DBili  x   /  AST  61<H>  /  ALT  37<H>  /  AlkPhos  132<H>  10-26    Amylase, Serum Total: 2120 U/L (10.26.21 @ 00:46)  Lipase, Serum: >3000 U/L (10.26.21 @ 00:46)    Urinalysis Basic - ( 26 Oct 2021 00:47 )    Color: Yellow / Appearance: Slightly Turbid / S.025 / pH: x  Gluc: x / Ketone: Large  / Bili: Small / Urobili: 4 mg/dL   Blood: x / Protein: 30 mg/dL / Nitrite: Negative   Leuk Esterase: Trace / RBC: 0-2 /HPF / WBC 0-2   Sq Epi: x / Non Sq Epi: Occasional / Bacteria: x

## 2021-10-26 NOTE — PROGRESS NOTE ADULT - PROBLEM SELECTOR PLAN 1
AST/ALT, amylase/lipase elevated  NPO except for medications  Will continue IV fluid bolus  Will continue IV maintenance  fluids- NS and D5NS depending on blood glucose  RUQ sonogram pending  Will consult GI this AM AST/ALT, amylase/lipase elevated  NPO except for medications  Will increased maintenance fluids at 300 ml/hr  RUQ sonogram pending  Will consider consulting GI this AM

## 2021-10-26 NOTE — PROGRESS NOTE ADULT - PROBLEM SELECTOR PLAN 2
Diagnosed during 1st trimester  Patient's home regimen is Humalin 86u qHS and Basaglar 10u qHS  Will not resume home insulin regimen at this time, until patient can tolerate PO again  NPO except meds  Will perform q6hr fingersticks while NPO  IV fluids NS if finger stick is >100 and D5NS if fingerstick is <100  Admelog sliding scale with q6hr fingersticks  Will evaluate for reinitiation of basal insulin during the day based on fingersticks.

## 2021-10-26 NOTE — CONSULT NOTE ADULT - SUBJECTIVE AND OBJECTIVE BOX
HPI: 31y  at 37w GA who presents to L&D because she's been having n/v, malaise, gas pains, and diarrhea all day. Pt states that she has vomited multiple times and has been feeling unusually tired today, taking multiple naps, which is abnormal for her. She also endorses gas pains and chest discomfort, noting that the pain is mostly epigastric and RUQ. She states she's been burping and passing gas excessively today. She has not taken any medications for her discomfort. She denies any blood in her stool or urine, denies vaginal bleeding. Endorses good fetal movement. Notes she had an NST today, which was reactive. Denies ctx, loss of fluid. Pt denies headaches, visual disturbances. She denies any urinary complaints. She denies fevers, chills. Denies sick contacts.     Surgery consult called for cholelithiasis noted on RUQ US today.    LMP: 21  RICHARD 21, scheduled for IOL on 21    Pregnancy course is significant for:  DM2 on humulin 86u daily, basiglar 10u qhs  hypothyroidism on synthroid 88mcg    POB: denies  PGYN: PCOS  PMH: hypothyroidism, DM2  PSH: wisdom teeth, ovarian scraping  Meds: synthroid, humulin, basiglar, ASA 81mg  All: chloroseptic spray - SOB (26 Oct 2021 02:01)      ICU Vital Signs Last 24 Hrs  T(C): 36.9 (26 Oct 2021 11:05), Max: 36.9 (26 Oct 2021 11:05)  T(F): 98.42 (26 Oct 2021 11:05), Max: 98.42 (26 Oct 2021 11:05)  HR: 88 (26 Oct 2021 12:17) (78 - 103)  BP: 125/64 (26 Oct 2021 11:05) (102/75 - 134/60)  BP(mean): --  ABP: --  ABP(mean): --  RR: 17 (26 Oct 2021 11:05) (15 - 18)  SpO2: 97% (26 Oct 2021 12:12) (96% - 99%)      I&O's Detail    26 Oct 2021 07:01  -  26 Oct 2021 12:19  --------------------------------------------------------  IN:    sodium chloride 0.9%: 300 mL  Total IN: 300 mL    OUT:  Total OUT: 0 mL    Total NET: 300 mL                MEDICATIONS  (STANDING):  dextrose 40% Gel 15 Gram(s) Oral once  dextrose 5%. 1000 milliLiter(s) (50 mL/Hr) IV Continuous <Continuous>  dextrose 5%. 1000 milliLiter(s) (100 mL/Hr) IV Continuous <Continuous>  dextrose 50% Injectable 25 Gram(s) IV Push once  dextrose 50% Injectable 12.5 Gram(s) IV Push once  dextrose 50% Injectable 25 Gram(s) IV Push once  glucagon  Injectable 1 milliGRAM(s) IntraMuscular once  insulin lispro (ADMELOG) corrective regimen sliding scale   SubCutaneous every 4 hours  levothyroxine 88 MICROGram(s) Oral daily  sodium chloride 0.9%. 1000 milliLiter(s) (200 mL/Hr) IV Continuous <Continuous>    MEDICATIONS  (PRN):  ondansetron Injectable 4 milliGRAM(s) IV Push every 6 hours PRN Nausea and/or Vomiting  oxyCODONE    IR 5 milliGRAM(s) Oral every 4 hours PRN Severe Pain (7 - 10)      NUTRITION/IVF:     CENTRAL LINE:  LOCATION:   DATE INSERTED:  CVP:  SCVO2:    BAHENA:   DATE INSERTED:    A-LINE:    LOCATION:   DATE INSERTED:   SVV:  CO/CI:     CHEST TUBE:  LOCATION:  DATE INSERTED: OUTPUT/24 HRS:  SUCTION/WATER SEAL:     NG/OG TUBE:  DATE INSERTED:  OUTPUT/24 HRS:    MISC:     PHYSICAL EXAM:    Gen:    Eyes:    Neurological:    ENMT:    Neck:    Pulmonary:    Cardiovascular:    Gastrointestinal:    Genitourinary:    Back:    Extremities:    Skin:    Musculoskeletal:          LABS:  CBC Full  -  ( 26 Oct 2021 09:01 )  WBC Count : 14.29 K/uL  RBC Count : 3.19 M/uL  Hemoglobin : 10.0 g/dL  Hematocrit : 29.4 %  Platelet Count - Automated : 219 K/uL  Mean Cell Volume : 92.2 fl  Mean Cell Hemoglobin : 31.3 pg  Mean Cell Hemoglobin Concentration : 34.0 gm/dL  Auto Neutrophil # : x  Auto Lymphocyte # : x  Auto Monocyte # : x  Auto Eosinophil # : x  Auto Basophil # : x  Auto Neutrophil % : x  Auto Lymphocyte % : x  Auto Monocyte % : x  Auto Eosinophil % : x  Auto Basophil % : x    10-    134<L>  |  101  |  10.0  ----------------------------<  113<H>  3.6   |  17.0<L>  |  0.43<L>    Ca    8.1<L>      26 Oct 2021 09:01    TPro  5.9<L>  /  Alb  3.3  /  TBili  0.6  /  DBili  x   /  AST  39<H>  /  ALT  30  /  AlkPhos  118  10-26      Urinalysis Basic - ( 26 Oct 2021 09:23 )    Color: Yellow / Appearance: Clear / S.025 / pH: x  Gluc: x / Ketone: Large  / Bili: Negative / Urobili: Negative mg/dL   Blood: x / Protein: 100 mg/dL / Nitrite: Negative   Leuk Esterase: Trace / RBC: 0-2 /HPF / WBC 0-2   Sq Epi: x / Non Sq Epi: Few / Bacteria: Few      RECENT CULTURES:      LIVER FUNCTIONS - ( 26 Oct 2021 09:01 )  Alb: 3.3 g/dL / Pro: 5.9 g/dL / ALK PHOS: 118 U/L / ALT: 30 U/L / AST: 39 U/L / GGT: x               CAPILLARY BLOOD GLUCOSE      RADIOLOGY & ADDITIONAL STUDIES:     EXAM:  US PANCREAS                          PROCEDURE DATE:  10/26/2021          INTERPRETATION:  History: Elevated amylase and lipase. 37 weeks pregnant.    TECHNIQUE: Targeted ultrasound of the right upper quadrant using a curved array transducer was performed.    FINDINGS: The liver is smooth in contour and homogeneous in echotexture. There is no intra or extrahepatic biliary duct dilatation. The common bile duct measures 5 mm.    The gallbladder is filled with numerous shadowing stones. No gallbladder wall thickening or pericholecystic fluid is identified. A sonographic Williamson sign was not elicited. There is no free fluid in the right upper quadrant.    The pancreatic head and neck are well visualized and homogeneous in echotexture. No peripancreatic fluid collection is identified. The pancreatic body and tail are obscured by bowel gas.    IMPRESSION: Gallstone filled gallbladder. No evidence of biliary dilatation. Unremarkable sonographic appearance of the visualized pancreatic head and neck.    --- End of Report ---      ASSESSMENT/PLAN:  31yFemale presenting with:    Neuro:    CV:    Pulm:    GI/Nutrition:    /Renal:    ID:    Lines/Tubes:    Endo:    Skin:    Proph:    Dispo:      CRITICAL CARE TIME SPENT: HPI: 31y  at 37w GA who presents to L&D because she's been having n/v, malaise, gas pains, and diarrhea all day. Pt states that she has vomited multiple times and has been feeling unusually tired today, taking multiple naps, which is abnormal for her. She also endorses gas pains and chest discomfort, noting that the pain is mostly epigastric and RUQ. She states she's been burping and passing gas excessively today. She has not taken any medications for her discomfort. She denies any blood in her stool or urine, denies vaginal bleeding. Endorses good fetal movement. Notes she had an NST today, which was reactive. Denies ctx, loss of fluid. Pt denies headaches, visual disturbances. She denies any urinary complaints. She denies fevers, chills. Denies sick contacts.     Surgery consult called for cholelithiasis noted on RUQ US today.    LMP: 21  RICHARD 21, scheduled for IOL on 21    Pregnancy course is significant for:  DM2 on humulin 86u daily, basiglar 10u qhs  hypothyroidism on synthroid 88mcg    POB: denies  PGYN: PCOS  PMH: hypothyroidism, DM2  PSH: wisdom teeth, ovarian scraping  Meds: synthroid, humulin, basiglar, ASA 81mg  All: chloroseptic spray - SOB (26 Oct 2021 02:01)      ICU Vital Signs Last 24 Hrs  T(C): 36.9 (26 Oct 2021 11:05), Max: 36.9 (26 Oct 2021 11:05)  T(F): 98.42 (26 Oct 2021 11:05), Max: 98.42 (26 Oct 2021 11:05)  HR: 88 (26 Oct 2021 12:17) (78 - 103)  BP: 125/64 (26 Oct 2021 11:05) (102/75 - 134/60)  BP(mean): --  ABP: --  ABP(mean): --  RR: 17 (26 Oct 2021 11:05) (15 - 18)  SpO2: 97% (26 Oct 2021 12:12) (96% - 99%)      I&O's Detail    26 Oct 2021 07:01  -  26 Oct 2021 12:19  --------------------------------------------------------  IN:    sodium chloride 0.9%: 300 mL  Total IN: 300 mL    OUT:  Total OUT: 0 mL    Total NET: 300 mL                MEDICATIONS  (STANDING):  dextrose 40% Gel 15 Gram(s) Oral once  dextrose 5%. 1000 milliLiter(s) (50 mL/Hr) IV Continuous <Continuous>  dextrose 5%. 1000 milliLiter(s) (100 mL/Hr) IV Continuous <Continuous>  dextrose 50% Injectable 25 Gram(s) IV Push once  dextrose 50% Injectable 12.5 Gram(s) IV Push once  dextrose 50% Injectable 25 Gram(s) IV Push once  glucagon  Injectable 1 milliGRAM(s) IntraMuscular once  insulin lispro (ADMELOG) corrective regimen sliding scale   SubCutaneous every 4 hours  levothyroxine 88 MICROGram(s) Oral daily  sodium chloride 0.9%. 1000 milliLiter(s) (200 mL/Hr) IV Continuous <Continuous>    MEDICATIONS  (PRN):  ondansetron Injectable 4 milliGRAM(s) IV Push every 6 hours PRN Nausea and/or Vomiting  oxyCODONE    IR 5 milliGRAM(s) Oral every 4 hours PRN Severe Pain (7 - 10)      NUTRITION/IVF:  NPO/ NS @ 200cc/hr     CENTRAL LINE:  No    BAHENA:  No    A-LINE:  No       PHYSICAL EXAM:    Gen:  Pt examined down in fetal monitoring, NAD    Eyes:  EOMI's    Neurological:  A&O x3    ENMT:  MMM    Neck:  Supple, no JVD    Pulmonary:  Unlabored, pt w/ more shallow breaths, appropriate for 37w gravid abd laying supine.  No acute distress, converses in full sentences    Cardiovascular:  NSR    Gastrointestinal:  Gravid abd at approx 37 weeks, ttp RUQ without rebound or guarding, +Williamson's on my exam.  Minimal epigastric ttp    Genitourinary:  voids    Back:  No CVAT or flank ttp    Extremities:  AFROM x4    Skin:  Pink, warm, dry              LABS:  CBC Full  -  ( 26 Oct 2021 09:01 )  WBC Count : 14.29 K/uL  RBC Count : 3.19 M/uL  Hemoglobin : 10.0 g/dL  Hematocrit : 29.4 %  Platelet Count - Automated : 219 K/uL  Mean Cell Volume : 92.2 fl  Mean Cell Hemoglobin : 31.3 pg  Mean Cell Hemoglobin Concentration : 34.0 gm/dL  Auto Neutrophil # : x  Auto Lymphocyte # : x  Auto Monocyte # : x  Auto Eosinophil # : x  Auto Basophil # : x  Auto Neutrophil % : x  Auto Lymphocyte % : x  Auto Monocyte % : x  Auto Eosinophil % : x  Auto Basophil % : x    10-26    134<L>  |  101  |  10.0  ----------------------------<  113<H>  3.6   |  17.0<L>  |  0.43<L>    Ca    8.1<L>      26 Oct 2021 09:01    TPro  5.9<L>  /  Alb  3.3  /  TBili  0.6  /  DBili  x   /  AST  39<H>  /  ALT  30  /  AlkPhos  118  10-26      Urinalysis Basic - ( 26 Oct 2021 09:23 )    Color: Yellow / Appearance: Clear / S.025 / pH: x  Gluc: x / Ketone: Large  / Bili: Negative / Urobili: Negative mg/dL   Blood: x / Protein: 100 mg/dL / Nitrite: Negative   Leuk Esterase: Trace / RBC: 0-2 /HPF / WBC 0-2   Sq Epi: x / Non Sq Epi: Few / Bacteria: Few      RECENT CULTURES:      LIVER FUNCTIONS - ( 26 Oct 2021 09:01 )  Alb: 3.3 g/dL / Pro: 5.9 g/dL / ALK PHOS: 118 U/L / ALT: 30 U/L / AST: 39 U/L / GGT: x               CAPILLARY BLOOD GLUCOSE      RADIOLOGY & ADDITIONAL STUDIES:     EXAM:  US PANCREAS                          PROCEDURE DATE:  10/26/2021          INTERPRETATION:  History: Elevated amylase and lipase. 37 weeks pregnant.    TECHNIQUE: Targeted ultrasound of the right upper quadrant using a curved array transducer was performed.    FINDINGS: The liver is smooth in contour and homogeneous in echotexture. There is no intra or extrahepatic biliary duct dilatation. The common bile duct measures 5 mm.    The gallbladder is filled with numerous shadowing stones. No gallbladder wall thickening or pericholecystic fluid is identified. A sonographic Williamson sign was not elicited. There is no free fluid in the right upper quadrant.    The pancreatic head and neck are well visualized and homogeneous in echotexture. No peripancreatic fluid collection is identified. The pancreatic body and tail are obscured by bowel gas.    IMPRESSION: Gallstone filled gallbladder. No evidence of biliary dilatation. Unremarkable sonographic appearance of the visualized pancreatic head and neck.    --- End of Report ---      ASSESSMENT/PLAN:  31yFemale presenting with:   HPI: 31y  at 37w GA who presents to L&D because she's been having n/v, malaise, gas pains, and diarrhea all day. Pt states that she has vomited multiple times and has been feeling unusually tired today, taking multiple naps, which is abnormal for her. She also endorses gas pains and chest discomfort, noting that the pain is mostly epigastric and RUQ. She states she's been burping and passing gas excessively today. She has not taken any medications for her discomfort. She denies any blood in her stool or urine, denies vaginal bleeding. Endorses good fetal movement. Notes she had an NST today, which was reactive. Denies ctx, loss of fluid. Pt denies headaches, visual disturbances. She denies any urinary complaints. She denies fevers, chills. Denies sick contacts.     Surgery consult called for cholelithiasis noted on RUQ US today.    LMP: 21  RICHARD 21, scheduled for IOL on 21    Pregnancy course is significant for:  DM2 on humulin 86u daily, basiglar 10u qhs  hypothyroidism on synthroid 88mcg    POB: denies  PGYN: PCOS  PMH: hypothyroidism, DM2  PSH: wisdom teeth, ovarian scraping  Meds: synthroid, humulin, basiglar, ASA 81mg  All: chloroseptic spray - SOB (26 Oct 2021 02:01)      ICU Vital Signs Last 24 Hrs  T(C): 36.9 (26 Oct 2021 11:05), Max: 36.9 (26 Oct 2021 11:05)  T(F): 98.42 (26 Oct 2021 11:05), Max: 98.42 (26 Oct 2021 11:05)  HR: 88 (26 Oct 2021 12:17) (78 - 103)  BP: 125/64 (26 Oct 2021 11:05) (102/75 - 134/60)  BP(mean): --  ABP: --  ABP(mean): --  RR: 17 (26 Oct 2021 11:05) (15 - 18)  SpO2: 97% (26 Oct 2021 12:12) (96% - 99%)      I&O's Detail    26 Oct 2021 07:01  -  26 Oct 2021 12:19  --------------------------------------------------------  IN:    sodium chloride 0.9%: 300 mL  Total IN: 300 mL    OUT:  Total OUT: 0 mL    Total NET: 300 mL                MEDICATIONS  (STANDING):  dextrose 40% Gel 15 Gram(s) Oral once  dextrose 5%. 1000 milliLiter(s) (50 mL/Hr) IV Continuous <Continuous>  dextrose 5%. 1000 milliLiter(s) (100 mL/Hr) IV Continuous <Continuous>  dextrose 50% Injectable 25 Gram(s) IV Push once  dextrose 50% Injectable 12.5 Gram(s) IV Push once  dextrose 50% Injectable 25 Gram(s) IV Push once  glucagon  Injectable 1 milliGRAM(s) IntraMuscular once  insulin lispro (ADMELOG) corrective regimen sliding scale   SubCutaneous every 4 hours  levothyroxine 88 MICROGram(s) Oral daily  sodium chloride 0.9%. 1000 milliLiter(s) (200 mL/Hr) IV Continuous <Continuous>    MEDICATIONS  (PRN):  ondansetron Injectable 4 milliGRAM(s) IV Push every 6 hours PRN Nausea and/or Vomiting  oxyCODONE    IR 5 milliGRAM(s) Oral every 4 hours PRN Severe Pain (7 - 10)      NUTRITION/IVF:  NPO/ NS @ 200cc/hr     CENTRAL LINE:  No    BAIN:  No    A-LINE:  No       PHYSICAL EXAM:    Gen:  Pt examined down in fetal monitoring, NAD    Eyes:  EOMI's    Neurological:  A&O x3    ENMT:  MMM    Neck:  Supple, no JVD    Pulmonary:  Unlabored, pt w/ more shallow breaths, appropriate for 37w gravid abd laying supine.  No acute distress, converses in full sentences    Cardiovascular:  NSR    Gastrointestinal:  Gravid abd at approx 37 weeks, ttp RUQ without rebound or guarding, +Williamson's on my exam.  Minimal epigastric ttp    Genitourinary:  voids    Back:  No CVAT or flank ttp    Extremities:  AFROM x4    Skin:  Pink, warm, dry              LABS:  CBC Full  -  ( 26 Oct 2021 09:01 )  WBC Count : 14.29 K/uL  RBC Count : 3.19 M/uL  Hemoglobin : 10.0 g/dL  Hematocrit : 29.4 %  Platelet Count - Automated : 219 K/uL  Mean Cell Volume : 92.2 fl  Mean Cell Hemoglobin : 31.3 pg  Mean Cell Hemoglobin Concentration : 34.0 gm/dL  Auto Neutrophil # : x  Auto Lymphocyte # : x  Auto Monocyte # : x  Auto Eosinophil # : x  Auto Basophil # : x  Auto Neutrophil % : x  Auto Lymphocyte % : x  Auto Monocyte % : x  Auto Eosinophil % : x  Auto Basophil % : x    10-26    134<L>  |  101  |  10.0  ----------------------------<  113<H>  3.6   |  17.0<L>  |  0.43<L>    Ca    8.1<L>      26 Oct 2021 09:01    TPro  5.9<L>  /  Alb  3.3  /  TBili  0.6  /  DBili  x   /  AST  39<H>  /  ALT  30  /  AlkPhos  118  10-26      Urinalysis Basic - ( 26 Oct 2021 09:23 )    Color: Yellow / Appearance: Clear / S.025 / pH: x  Gluc: x / Ketone: Large  / Bili: Negative / Urobili: Negative mg/dL   Blood: x / Protein: 100 mg/dL / Nitrite: Negative   Leuk Esterase: Trace / RBC: 0-2 /HPF / WBC 0-2   Sq Epi: x / Non Sq Epi: Few / Bacteria: Few      RECENT CULTURES:      LIVER FUNCTIONS - ( 26 Oct 2021 09:01 )  Alb: 3.3 g/dL / Pro: 5.9 g/dL / ALK PHOS: 118 U/L / ALT: 30 U/L / AST: 39 U/L / GGT: x               CAPILLARY BLOOD GLUCOSE      RADIOLOGY & ADDITIONAL STUDIES:     EXAM:  US PANCREAS                          PROCEDURE DATE:  10/26/2021          INTERPRETATION:  History: Elevated amylase and lipase. 37 weeks pregnant.    TECHNIQUE: Targeted ultrasound of the right upper quadrant using a curved array transducer was performed.    FINDINGS: The liver is smooth in contour and homogeneous in echotexture. There is no intra or extrahepatic biliary duct dilatation. The common bile duct measures 5 mm.    The gallbladder is filled with numerous shadowing stones. No gallbladder wall thickening or pericholecystic fluid is identified. A sonographic Williamson sign was not elicited. There is no free fluid in the right upper quadrant.    The pancreatic head and neck are well visualized and homogeneous in echotexture. No peripancreatic fluid collection is identified. The pancreatic body and tail are obscured by bowel gas.    IMPRESSION: Gallstone filled gallbladder. No evidence of biliary dilatation. Unremarkable sonographic appearance of the visualized pancreatic head and neck.    --- End of Report ---      ASSESSMENT/PLAN:  31yFemale presenting with:  gallstone pancreatitis    Recommend NPO for bowel rest until pain improves  Pain control per OB for pregnancy safety  Recommend IVF hydration, monitor UOP, Cr for intravascular volume assessment  -Should the pt's urine output decrease, BUN/Cr worsen, would place bain for strict I/O's  No operative intervention at this immediate time.  Ideally, pt should progress to natural delivery and follow up as outpt for elective Lap Dilcia  Should pt's condition worsen, unable to tolerate PO, may need to reconsider delivery/operative intervention at that time.        Case D/W Dr Irene

## 2021-10-26 NOTE — CONSULT NOTE ADULT - PROBLEM SELECTOR RECOMMENDATION 4
Patient with hypothyroidism  Continue home synthroid dose of 88mcg daily  Patient follows with endocrinologist

## 2021-10-26 NOTE — PROGRESS NOTE ADULT - SUBJECTIVE AND OBJECTIVE BOX
JOSEP CARRANZA  A 31year old  Last Menstrual Period 21   EDC 21 at 37 weeks Gestational Age    Subjective  Overnight, patient stated that her pain is better controlled and that she was able to get some sleep with benadryl and oxycodone prn. She denies having nausea or vomiting overnight. She has been NPO and receiving IV fluids.     This pregnancy has been complicated by:  1. DM2 diagnosed in 1st trimester- on humulin 86u daily, basiglar 10u qhs  2. hypothyroidism on synthroid 80mcg  3. Obesity BMI 41.3    Vital Signs Last 24 Hrs  T(C): 36.6 (26 Oct 2021 04:50), Max: 36.7 (26 Oct 2021 02:42)  T(F): 97.8 (26 Oct 2021 04:50), Max: 98.1 (26 Oct 2021 02:42)  HR: 92 (26 Oct 2021 04:50) (82 - 103)  BP: 132/85 (26 Oct 2021 04:50) (114/57 - 134/60)  RR: 16 (26 Oct 2021 04:50) (16 - 18)  SpO2: 96% (26 Oct 2021 04:50) (96% - 96%)  General: Alert and oriented x3, no acute distress  Cardiovascular: regular rate and rhythm, no murmurs, rubs or gallops appreciated on exam  Respiratory: clear to auscultation bilaterally  Abdominal: gravid uterus, non-tender to palpation  Pelvic: deferred  Extremities: no redness, tenderness or swelling in lower extremities bilaterally     FHT: baseline 135bpm, moderate variability, +accels, -deccels  [place]: no contractions overnight    pending right upper quadrant sono    Labs:                          11.7   18.66 )-----------( 255      ( 26 Oct 2021 00:46 )             33.8     10-    136  |  102  |  9.4  ----------------------------<  117<H>  3.7   |  17.0<L>  |  0.50    Ca    8.3<L>      26 Oct 2021 00:46    TPro  7.2  /  Alb  3.7  /  TBili  0.6  /  DBili  x   /  AST  61<H>  /  ALT  37<H>  /  AlkPhos  132<H>  10-26    pending AM labs  pending irght upper quadrant sono read

## 2021-10-26 NOTE — CONSULT NOTE ADULT - SUBJECTIVE AND OBJECTIVE BOX
JOSEP CARRANZA  A 31year old  Last Menstrual Period 21   EDC 21 at 37 weeks Gestational Age    Patient presenting to L&D for a few days of worsening nausea/vomiting/diarrhea, and severe upper abdominal and upper back pain. She reports several episodes of emesis during the day. She reports feeling more fatigued than usual. She reports gas pain and epigastric discomfort that sometimes feels like chest pain. She reports significant burping and passing flatus today. She did not take any medications for her pain. She denies blood in stool, urine and vomit. She denies fever and chills. She reports good fetal movement. She denies contractions, vaginal bleeding and leakage of fluid. She denies headaches, visual changes, lightheadedness and dizziness. She denies dysuria. She denies sick contacts    This pregnancy has been complicated by:  1. DM2 on humulin 86u daily, basiglar 10u qhs  2. hypothyroidism on synthroid 80mcg    POB: denies  PGYN: PCOS  PMH: hypothyroidism, DM2  PSH: wisdom teeth, ovarian scraping  Meds: synthroid, humulin, basiglar  All: chloroseptic spray - SOB        REVIEW OF SYSTEMS:    CONSTITUTIONAL: + weakness, no fevers or chills  EYES/ENT: No visual changes;  No vertigo or throat pain   NECK: No pain or stiffness  RESPIRATORY: No cough, wheezing, hemoptysis; No shortness of breath  CARDIOVASCULAR: No chest pain or palpitations  GASTROINTESTINAL: + abdominal and epigastric pain. + nausea, vomiting, no hematemesis; + diarrhea, no constipation. No melena or hematochezia.  GENITOURINARY: No dysuria, frequency or hematuria  NEUROLOGICAL: No numbness or weakness  SKIN: No itching, burning, rashes, or lesions   All other review of systems is negative unless indicated above.      Vital Signs:  Vital Signs Last 24 Hrs  T(C): 36.6 (25 Oct 2021 22:43), Max: 36.6 (25 Oct 2021 22:43)  T(F): 97.9 (25 Oct 2021 22:43), Max: 97.9 (25 Oct 2021 22:43)  HR: 85 (26 Oct 2021 01:15) (82 - 103)  BP: 114/57 (26 Oct 2021 01:15) (114/57 - 134/60)  RR: 16 (25 Oct 2021 22:43) (16 - 16)    FHT: baseline 145, moderate variability, + accels, - decels  [place]: no ctx noted      Physical Exam:  General: Adult female in moderate distress, AOx4  Head/Neck: No neck masses, no lymphadenopathy  CVS: RRR, +S1/S2, no murmurs  Lungs: CTAB, no wheezing, rhonchi or rales  Back: b/l midback pain with palpation  Abdomen: soft, gravid, diffusely mildly tender, increased tenderness in epigastrium and RUQ  Pelvic: Deferred  Ext: No cyanosis, edema or calf tenderness  Skin: No rashes or lesions on exposed skin  Neuro: Normal DTRs, grossly intact    Labs:                          11.7   18.66 )-----------( 255      ( 26 Oct 2021 00:46 )             33.8     10-26    136  |  102  |  9.4  ----------------------------<  117<H>  3.7   |  17.0<L>  |  0.50    Ca    8.3<L>      26 Oct 2021 00:46    TPro  7.2  /  Alb  3.7  /  TBili  0.6  /  DBili  x   /  AST  61<H>  /  ALT  37<H>  /  AlkPhos  132<H>  10-26            Radiology: RUQ sono pending    MEDICATIONS  (STANDING):  dextrose 40% Gel 15 Gram(s) Oral once  dextrose 5% + sodium chloride 0.9%. 1000 milliLiter(s) (125 mL/Hr) IV Continuous <Continuous>  dextrose 5%. 1000 milliLiter(s) (50 mL/Hr) IV Continuous <Continuous>  dextrose 5%. 1000 milliLiter(s) (100 mL/Hr) IV Continuous <Continuous>  dextrose 50% Injectable 25 Gram(s) IV Push once  dextrose 50% Injectable 12.5 Gram(s) IV Push once  dextrose 50% Injectable 25 Gram(s) IV Push once  glucagon  Injectable 1 milliGRAM(s) IntraMuscular once  insulin lispro (ADMELOG) corrective regimen sliding scale   SubCutaneous every 6 hours  sodium chloride 0.9% Bolus 1000 milliLiter(s) IV Bolus once  sodium chloride 0.9%. 1000 milliLiter(s) (125 mL/Hr) IV Continuous <Continuous>    MEDICATIONS  (PRN):  ondansetron Injectable 4 milliGRAM(s) IV Push every 6 hours PRN Nausea and/or Vomiting  oxyCODONE    IR 5 milliGRAM(s) Oral every 4 hours PRN Severe Pain (7 - 10)   JOSEP CARRANZA  A 31year old  Last Menstrual Period 21   EDC 21 at 37 weeks Gestational Age    Patient presenting to L&D for a few days of worsening nausea/vomiting/diarrhea, and severe upper abdominal and upper back pain. She reports several episodes of emesis during the day. She reports feeling more fatigued than usual. She reports gas pain and epigastric discomfort that sometimes feels like chest pain. She reports significant burping and passing flatus today. She did not take any medications for her pain. She denies blood in stool, urine and vomit. She denies fever and chills. She reports good fetal movement. She denies contractions, vaginal bleeding and leakage of fluid. She denies headaches, visual changes, lightheadedness and dizziness. She denies dysuria. She denies sick contacts    This pregnancy has been complicated by:  1. DM2 diagnosed in 1st trimester- on humulin 86u daily, basiglar 10u qhs  2. hypothyroidism on synthroid 80mcg  3. Obesity BMI 41.3    POB: denies  PGYN: PCOS  PMH: hypothyroidism, DM2, obesity  PSH: wisdom teeth, ovarian scraping  Meds: synthroid, humulin, basiglar  All: chloroseptic spray - SOB        REVIEW OF SYSTEMS:    CONSTITUTIONAL: + weakness, no fevers or chills  EYES/ENT: No visual changes;  No vertigo or throat pain   NECK: No pain or stiffness  RESPIRATORY: No cough, wheezing, hemoptysis; No shortness of breath  CARDIOVASCULAR: No chest pain or palpitations  GASTROINTESTINAL: + abdominal and epigastric pain. + nausea, vomiting, no hematemesis; + diarrhea, no constipation. No melena or hematochezia.  GENITOURINARY: No dysuria, frequency or hematuria  NEUROLOGICAL: No numbness or weakness  SKIN: No itching, burning, rashes, or lesions   All other review of systems is negative unless indicated above.      Vital Signs:  Vital Signs Last 24 Hrs  T(C): 36.6 (25 Oct 2021 22:43), Max: 36.6 (25 Oct 2021 22:43)  T(F): 97.9 (25 Oct 2021 22:43), Max: 97.9 (25 Oct 2021 22:43)  HR: 85 (26 Oct 2021 01:15) (82 - 103)  BP: 114/57 (26 Oct 2021 01:15) (114/57 - 134/60)  RR: 16 (25 Oct 2021 22:43) (16 - 16)    FHT: baseline 145, moderate variability, + accels, - decels  [place]: no ctx noted      Physical Exam:  General: Adult female in moderate distress, AOx4  Head/Neck: No neck masses, no lymphadenopathy  CVS: RRR, +S1/S2, no murmurs  Lungs: CTAB, no wheezing, rhonchi or rales  Back: b/l midback pain with palpation  Abdomen: soft, gravid, diffusely mildly tender, increased tenderness in epigastrium and RUQ  Pelvic: Deferred  Ext: No cyanosis, edema or calf tenderness  Skin: No rashes or lesions on exposed skin  Neuro: Normal DTRs, grossly intact    Labs:                          11.7   18.66 )-----------( 255      ( 26 Oct 2021 00:46 )             33.8     10-26    136  |  102  |  9.4  ----------------------------<  117<H>  3.7   |  17.0<L>  |  0.50    Ca    8.3<L>      26 Oct 2021 00:46    TPro  7.2  /  Alb  3.7  /  TBili  0.6  /  DBili  x   /  AST  61<H>  /  ALT  37<H>  /  AlkPhos  132<H>  10-26            Radiology: RUQ sono pending    MEDICATIONS  (STANDING):  dextrose 40% Gel 15 Gram(s) Oral once  dextrose 5% + sodium chloride 0.9%. 1000 milliLiter(s) (125 mL/Hr) IV Continuous <Continuous>  dextrose 5%. 1000 milliLiter(s) (50 mL/Hr) IV Continuous <Continuous>  dextrose 5%. 1000 milliLiter(s) (100 mL/Hr) IV Continuous <Continuous>  dextrose 50% Injectable 25 Gram(s) IV Push once  dextrose 50% Injectable 12.5 Gram(s) IV Push once  dextrose 50% Injectable 25 Gram(s) IV Push once  glucagon  Injectable 1 milliGRAM(s) IntraMuscular once  insulin lispro (ADMELOG) corrective regimen sliding scale   SubCutaneous every 6 hours  sodium chloride 0.9% Bolus 1000 milliLiter(s) IV Bolus once  sodium chloride 0.9%. 1000 milliLiter(s) (125 mL/Hr) IV Continuous <Continuous>    MEDICATIONS  (PRN):  ondansetron Injectable 4 milliGRAM(s) IV Push every 6 hours PRN Nausea and/or Vomiting  oxyCODONE    IR 5 milliGRAM(s) Oral every 4 hours PRN Severe Pain (7 - 10)

## 2021-10-26 NOTE — OB PROVIDER H&P - HISTORY OF PRESENT ILLNESS
JOSEP CARRANZA is a 31y  at 37w GA who presents to L&D because she's been having n/v, malaise, gas pains, and diarrhea all day. Pt states that she has vomited multiple times and has been feeling unusually tired today, taking multiple naps, which is abnormal for her. She also endorses gas pains and chest discomfort, noting that the pain is mostly epigastric and RUQ. She states she's been burping and passing gas excessively today. She has not taken any medications for her discomfort. She denies any blood in her stool or urine, denies vaginal bleeding. Endorses good fetal movement. Notes she had an NST today, which was reactive. Denies ctx, loss of fluid. Pt denies headaches, visual disturbances. She denies any urinary complaints. She denies fevers, chills. Denies sick contacts.     LMP: 21  RICHARD 21, scheduled for IOL on 21    Pregnancy course is significant for:  DM2 on humulin 86u daily, basiglar 10u qhs  hypothyroidism on synthroid 88mcg    POB: denies  PGYN: PCOS  PMH: hypothyroidism, DM2  PSH: wisdom teeth, ovarian scraping  Meds: synthroid, humulin, basiglar, ASA 81mg  All: chloroseptic spray - SOB

## 2021-10-26 NOTE — PROGRESS NOTE ADULT - PROBLEM SELECTOR PLAN 5
SCDs while in bed  Subq heparin on HD#3 if still admitted and pregnant  No need for betamethasone due to gestational age.

## 2021-10-26 NOTE — CONSULT NOTE ADULT - ATTENDING COMMENTS
seen and examined 10-26-21 @ 1350 (while undergoing nonstress test)    37 weeks pregnant  labor induction planned for 11/9 for type 2 DM    soft / mild epigastric tenderness / ND  no surgical scars on abdomen  no jaundice    WBC = 14  lipase = 1313  LFTs - wnl    U/S abd (10/26/2021) - cholelithiasis w/o evidence of acute cholecystitis. CBD = 5 mm.      gallstone pancreatitis  no evidence of choledocholithiasis  -Given that she has a late 3rd trimester pregnancy and induction is planned for 2 weeks from now, I recommended laparoscopic cholecystectomy with cholangiogram as early as possible in the postpartum period to prevent recurrence of acute pancreatitis. There is a risk of recurrent acute pancreatitis prior to delivery, so I also offered cholecystectomy during this admission, but the patient and her partner prefer to wait until after delivery.  -will attempt to schedule cholecystectomy at the beginning of November, possibly on 11/10 or 11/11.  -advance diet as tolerated

## 2021-10-26 NOTE — CONSULT NOTE ADULT - PROBLEM SELECTOR RECOMMENDATION 3
Patient's home regimen is Humalin 86u qHS and Basaglar 10u qHS  Will not resume home insulin regimen at this time.  NPO except meds  Will perform q6hr fingersticks while NPO  IV fluids NS if finger stick is >100 and D5NS if fingerstick is <100  Admelog sliding scale with q6hr fingersticks  Will evaluate for reinitiation of basal insulin during the day based on fingersticks Diagnosed during 1st trimester  Patient's home regimen is Humalin 86u qHS and Basaglar 10u qHS  Will not resume home insulin regimen at this time.  NPO except meds  Will perform q6hr fingersticks while NPO  IV fluids NS if finger stick is >100 and D5NS if fingerstick is <100  Admelog sliding scale with q6hr fingersticks  Will evaluate for reinitiation of basal insulin during the day based on fingersticks

## 2021-10-26 NOTE — OB PROVIDER H&P - NSHPPHYSICALEXAM_GEN_ALL_CORE
T(C): 36.6 (10-25-21 @ 22:43), Max: 36.6 (10-25-21 @ 22:43)  HR: 103 (10-25-21 @ 22:43) (103 - 103)  BP: 134/60 (10-25-21 @ 22:43) (134/60 - 134/60)  RR: 16 (10-25-21 @ 22:43) (16 - 16)    Gen: in apparent discomfort  Chest: breathing comfortably on RA, tender to pressure  Abd: diffusely mildly tender, more severe in upper quadrants  Renal: b/l CVA tenderness    FHT: baseline 125, mod variability, +accels, -decels   [place]: no ctx noted
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
## 2021-10-26 NOTE — OB PROVIDER H&P - ASSESSMENT
JOSEP CARRANZA is a 31y  at 37w GA admitted for pancreatitis.    - amylase/lipase 2120/>3000  - AST/ALT 61/37  - WBC 18.66  - RUQ US pending  - pt notes improvement s/p zofran  - oxycodone 5mg q4h prn for severe pain  - IVF LR bolus, then @125cc/hr  - NPO except meds  - GI cx in the morning  - MFM cx pending  - DM2: FS q6h while NPO, ISS    discussed with Dr. Kunz

## 2021-10-26 NOTE — CONSULT NOTE ADULT - PROBLEM SELECTOR RECOMMENDATION 9
NST reactive  Continue qShift NST  Continue PNV  Continue baby Aspirin  Last MFM growth scan 10/21- Vtx, anterior placenta, Weight 2621g 24th%ile, BPP 10/10

## 2021-10-26 NOTE — CONSULT NOTE ADULT - PROBLEM SELECTOR RECOMMENDATION 2
AST/ALT, amylase/lipase elevated  NPO except for medications  Will give IV fluid bolus  Will continue IV maintenance  fluids- NS and D5NS depending on blood glucose  RUQ sonogram pending  Will consult GI in AM

## 2021-10-26 NOTE — CONSULT NOTE ADULT - PROBLEM SELECTOR RECOMMENDATION 6
SCDs while in bed  Subq heparin on HD#3 if still admitted and pregnant  No need for betamethasone due to gestational age

## 2021-10-27 LAB
ALBUMIN SERPL ELPH-MCNC: 3 G/DL — LOW (ref 3.3–5.2)
ALP SERPL-CCNC: 103 U/L — SIGNIFICANT CHANGE UP (ref 40–120)
ALT FLD-CCNC: 23 U/L — SIGNIFICANT CHANGE UP
ANION GAP SERPL CALC-SCNC: 13 MMOL/L — SIGNIFICANT CHANGE UP (ref 5–17)
AST SERPL-CCNC: 25 U/L — SIGNIFICANT CHANGE UP
BILIRUB SERPL-MCNC: 0.4 MG/DL — SIGNIFICANT CHANGE UP (ref 0.4–2)
BLD GP AB SCN SERPL QL: SIGNIFICANT CHANGE UP
BUN SERPL-MCNC: 8.6 MG/DL — SIGNIFICANT CHANGE UP (ref 8–20)
CALCIUM SERPL-MCNC: 7.8 MG/DL — LOW (ref 8.6–10.2)
CHLORIDE SERPL-SCNC: 106 MMOL/L — SIGNIFICANT CHANGE UP (ref 98–107)
CO2 SERPL-SCNC: 17 MMOL/L — LOW (ref 22–29)
CREAT SERPL-MCNC: 0.37 MG/DL — LOW (ref 0.5–1.3)
GLUCOSE BLDC GLUCOMTR-MCNC: 101 MG/DL — HIGH (ref 70–99)
GLUCOSE BLDC GLUCOMTR-MCNC: 106 MG/DL — HIGH (ref 70–99)
GLUCOSE BLDC GLUCOMTR-MCNC: 82 MG/DL — SIGNIFICANT CHANGE UP (ref 70–99)
GLUCOSE BLDC GLUCOMTR-MCNC: 83 MG/DL — SIGNIFICANT CHANGE UP (ref 70–99)
GLUCOSE BLDC GLUCOMTR-MCNC: 96 MG/DL — SIGNIFICANT CHANGE UP (ref 70–99)
GLUCOSE SERPL-MCNC: 100 MG/DL — HIGH (ref 70–99)
HCT VFR BLD CALC: 27.7 % — LOW (ref 34.5–45)
HGB BLD-MCNC: 9 G/DL — LOW (ref 11.5–15.5)
MCHC RBC-ENTMCNC: 30.7 PG — SIGNIFICANT CHANGE UP (ref 27–34)
MCHC RBC-ENTMCNC: 32.5 GM/DL — SIGNIFICANT CHANGE UP (ref 32–36)
MCV RBC AUTO: 94.5 FL — SIGNIFICANT CHANGE UP (ref 80–100)
PLATELET # BLD AUTO: 174 K/UL — SIGNIFICANT CHANGE UP (ref 150–400)
POTASSIUM SERPL-MCNC: 3.8 MMOL/L — SIGNIFICANT CHANGE UP (ref 3.5–5.3)
POTASSIUM SERPL-SCNC: 3.8 MMOL/L — SIGNIFICANT CHANGE UP (ref 3.5–5.3)
PROT SERPL-MCNC: 5.3 G/DL — LOW (ref 6.6–8.7)
RBC # BLD: 2.93 M/UL — LOW (ref 3.8–5.2)
RBC # FLD: 13.2 % — SIGNIFICANT CHANGE UP (ref 10.3–14.5)
SODIUM SERPL-SCNC: 136 MMOL/L — SIGNIFICANT CHANGE UP (ref 135–145)
WBC # BLD: 10.62 K/UL — HIGH (ref 3.8–10.5)
WBC # FLD AUTO: 10.62 K/UL — HIGH (ref 3.8–10.5)

## 2021-10-27 PROCEDURE — 99233 SBSQ HOSP IP/OBS HIGH 50: CPT | Mod: GC

## 2021-10-27 RX ORDER — INSULIN LISPRO 100/ML
VIAL (ML) SUBCUTANEOUS
Refills: 0 | Status: DISCONTINUED | OUTPATIENT
Start: 2021-10-27 | End: 2021-10-28

## 2021-10-27 RX ORDER — SODIUM CHLORIDE 9 MG/ML
1000 INJECTION, SOLUTION INTRAVENOUS
Refills: 0 | Status: DISCONTINUED | OUTPATIENT
Start: 2021-10-27 | End: 2021-10-28

## 2021-10-27 RX ORDER — GLUCAGON INJECTION, SOLUTION 0.5 MG/.1ML
1 INJECTION, SOLUTION SUBCUTANEOUS ONCE
Refills: 0 | Status: DISCONTINUED | OUTPATIENT
Start: 2021-10-27 | End: 2021-10-29

## 2021-10-27 RX ORDER — DEXTROSE 50 % IN WATER 50 %
15 SYRINGE (ML) INTRAVENOUS ONCE
Refills: 0 | Status: DISCONTINUED | OUTPATIENT
Start: 2021-10-27 | End: 2021-10-29

## 2021-10-27 RX ORDER — HUMAN INSULIN 100 [IU]/ML
86 INJECTION, SUSPENSION SUBCUTANEOUS AT BEDTIME
Refills: 0 | Status: DISCONTINUED | OUTPATIENT
Start: 2021-10-27 | End: 2021-10-28

## 2021-10-27 RX ORDER — DEXTROSE 50 % IN WATER 50 %
25 SYRINGE (ML) INTRAVENOUS ONCE
Refills: 0 | Status: DISCONTINUED | OUTPATIENT
Start: 2021-10-27 | End: 2021-10-29

## 2021-10-27 RX ORDER — DEXTROSE 50 % IN WATER 50 %
12.5 SYRINGE (ML) INTRAVENOUS ONCE
Refills: 0 | Status: DISCONTINUED | OUTPATIENT
Start: 2021-10-27 | End: 2021-10-29

## 2021-10-27 RX ORDER — SODIUM CHLORIDE 9 MG/ML
1000 INJECTION INTRAMUSCULAR; INTRAVENOUS; SUBCUTANEOUS
Refills: 0 | Status: DISCONTINUED | OUTPATIENT
Start: 2021-10-27 | End: 2021-10-28

## 2021-10-27 RX ORDER — INSULIN GLARGINE 100 [IU]/ML
8 INJECTION, SOLUTION SUBCUTANEOUS AT BEDTIME
Refills: 0 | Status: DISCONTINUED | OUTPATIENT
Start: 2021-10-27 | End: 2021-10-28

## 2021-10-27 RX ORDER — HYDROMORPHONE HYDROCHLORIDE 2 MG/ML
1 INJECTION INTRAMUSCULAR; INTRAVENOUS; SUBCUTANEOUS EVERY 4 HOURS
Refills: 0 | Status: DISCONTINUED | OUTPATIENT
Start: 2021-10-27 | End: 2021-10-29

## 2021-10-27 RX ADMIN — SODIUM CHLORIDE 125 MILLILITER(S): 9 INJECTION INTRAMUSCULAR; INTRAVENOUS; SUBCUTANEOUS at 17:13

## 2021-10-27 RX ADMIN — HUMAN INSULIN 86 UNIT(S): 100 INJECTION, SUSPENSION SUBCUTANEOUS at 22:28

## 2021-10-27 RX ADMIN — HYDROMORPHONE HYDROCHLORIDE 1 MILLIGRAM(S): 2 INJECTION INTRAMUSCULAR; INTRAVENOUS; SUBCUTANEOUS at 08:01

## 2021-10-27 RX ADMIN — SODIUM CHLORIDE 200 MILLILITER(S): 9 INJECTION INTRAMUSCULAR; INTRAVENOUS; SUBCUTANEOUS at 07:46

## 2021-10-27 RX ADMIN — FAMOTIDINE 20 MILLIGRAM(S): 10 INJECTION INTRAVENOUS at 06:10

## 2021-10-27 RX ADMIN — Medication 1: at 07:54

## 2021-10-27 RX ADMIN — SODIUM CHLORIDE 200 MILLILITER(S): 9 INJECTION, SOLUTION INTRAVENOUS at 06:10

## 2021-10-27 RX ADMIN — FAMOTIDINE 20 MILLIGRAM(S): 10 INJECTION INTRAVENOUS at 17:13

## 2021-10-27 RX ADMIN — HYDROMORPHONE HYDROCHLORIDE 1 MILLIGRAM(S): 2 INJECTION INTRAMUSCULAR; INTRAVENOUS; SUBCUTANEOUS at 15:04

## 2021-10-27 RX ADMIN — INSULIN GLARGINE 8 UNIT(S): 100 INJECTION, SOLUTION SUBCUTANEOUS at 22:27

## 2021-10-27 RX ADMIN — HYDROMORPHONE HYDROCHLORIDE 1 MILLIGRAM(S): 2 INJECTION INTRAMUSCULAR; INTRAVENOUS; SUBCUTANEOUS at 02:39

## 2021-10-27 RX ADMIN — Medication 88 MICROGRAM(S): at 09:51

## 2021-10-27 RX ADMIN — HYDROMORPHONE HYDROCHLORIDE 1 MILLIGRAM(S): 2 INJECTION INTRAMUSCULAR; INTRAVENOUS; SUBCUTANEOUS at 15:19

## 2021-10-27 RX ADMIN — SODIUM CHLORIDE 125 MILLILITER(S): 9 INJECTION INTRAMUSCULAR; INTRAVENOUS; SUBCUTANEOUS at 12:37

## 2021-10-27 RX ADMIN — HYDROMORPHONE HYDROCHLORIDE 1 MILLIGRAM(S): 2 INJECTION INTRAMUSCULAR; INTRAVENOUS; SUBCUTANEOUS at 02:52

## 2021-10-27 RX ADMIN — Medication 1: at 17:30

## 2021-10-27 RX ADMIN — HYDROMORPHONE HYDROCHLORIDE 1 MILLIGRAM(S): 2 INJECTION INTRAMUSCULAR; INTRAVENOUS; SUBCUTANEOUS at 07:46

## 2021-10-27 NOTE — PROGRESS NOTE ADULT - PROBLEM SELECTOR PLAN 2
Diagnosed during 1st trimester  Patient's home regimen is Humalin 86u qHS and Basaglar 10u qHS  Will not resume home insulin regimen at this time, until patient can tolerate PO again  NPO except meds  Will perform q4hr fingersticks while NPO  IV fluids NS if finger stick is >100 and D5NS if fingerstick is <100  Admelog sliding scale with q6hr fingersticks  Will evaluate for reinitiation of basal insulin during the day based on fingersticks. Diagnosed during 1st trimester  Patient's home regimen is Humulin 86u qHS and Basaglar 10u qHS  Will not resume home insulin regimen at this time, until patient can tolerate PO again  NPO except meds  Will perform q4hr fingersticks while NPO  IV fluids NS if finger stick is >100 and D5NS if fingerstick is <100  Admelog sliding scale with q4hr fingersticks  Will evaluate for reinitiation of basal insulin during the day based on fingersticks.

## 2021-10-27 NOTE — PROGRESS NOTE ADULT - PROBLEM SELECTOR PLAN 1
AST/ALT, amylase/lipase elevated, but downtrending   NPO except for medications  Continue maintenance fluids at 200 ml/hr  Pain controlled with dilaudid 1mg q4h   RUQ sonogram showing gallstones without obstruction   Surgery consulted and recommendations appreciated

## 2021-10-27 NOTE — PROGRESS NOTE ADULT - SUBJECTIVE AND OBJECTIVE BOX
JOSEP CARRANZA  A 31year old  Last Menstrual Period 21  EDC 21 at 37 weeks 1 day Gestational Age    Subjective  Overnight, patient stated that her pain is better controlled on the Dilaudid every 4 hours. Overnight, she endorses nausea and dizziness.   Patient denies vaginal bleeding, contractions and leakage of fluid. She endorses good fetal movement.   She has been NPO and receiving IV fluids.     This pregnancy has been complicated by:  1. DM2 diagnosed in 1st trimester- on humulin 86u daily, basiglar 10u qhs  2. hypothyroidism on synthroid 80mcg  3. Obesity BMI 41.3    Vital Signs Last 24 Hrs  T(C): 37 (27 Oct 2021 06:20), Max: 37 (27 Oct 2021 06:20)  T(F): 98.6 (27 Oct 2021 06:20), Max: 98.6 (27 Oct 2021 06:20)  HR: 88 (27 Oct 2021 06:20) (70 - 103)  BP: 112/76 (27 Oct 2021 06:20) (95/62 - 125/64)  RR: 20 (27 Oct 2021 06:20) (15 - 20)  SpO2: 98% (27 Oct 2021 06:20) (94% - 99%)    General: Alert and oriented x3, no acute distress  Cardiovascular: regular rate and rhythm, no murmurs, rubs or gallops appreciated on exam  Respiratory: clear to auscultation bilaterally  Abdominal: gravid uterus, non-tender to palpation  Pelvic: deferred  Extremities: no redness, tenderness or swelling in lower extremities bilaterally   Overnight:   FHT: baseline 120s bpm, moderate variability, +accels, -deccels  [place]: no contractions overnight    Labs:                          9.5    13.15 )-----------( 205      ( 26 Oct 2021 17:20 )             28.2     10-    137  |  107  |  9.7  ----------------------------<  82  3.8   |  18.0<L>  |  0.41<L>    Ca    7.7<L>      26 Oct 2021 17:20    TPro  5.8<L>  /  Alb  2.9<L>  /  TBili  0.4  /  DBili  x   /  AST  33<H>  /  ALT  27  /  AlkPhos  107  10-    Amylase, Serum Total (10.26. @ 17:20)    Amylase, Serum Total: 603 U/L    Lipase, Serum (10. @ 17:20)    Lipase, Serum: 633 U/L    < from: US Pancreas (10.26.21 @ 03:35) >  FINDINGS: The liver is smooth in contour and homogeneous in echotexture. There is no intra or extrahepatic biliary duct dilatation. The common bile duct measures 5 mm.    The gallbladder is filled with numerous shadowing stones. Nogallbladder wall thickening or pericholecystic fluid is identified. A sonographic Williamson sign was not elicited. There is no free fluid in the right upper quadrant.    The pancreatic head and neck are well visualized and homogeneous in echotexture. Noperipancreatic fluid collection is identified. The pancreatic body and tail are obscured by bowel gas.    IMPRESSION: Gallstone filled gallbladder. No evidence of biliary dilatation. Unremarkable sonographic appearance of the visualized pancreatic head and neck.    < end of copied text >                             JOSEP CARRANZA  A 31year old  Last Menstrual Period 21  EDC 21 at 37 weeks 1 day Gestational Age    Subjective  Overnight, patient stated that her pain is better controlled on the Dilaudid every 4 hours. Overnight, she endorses nausea and dizziness.   Patient denies vaginal bleeding, contractions and leakage of fluid. She endorses good fetal movement.   She has been NPO and receiving IV fluids.     This pregnancy has been complicated by:  1. DM2 diagnosed in 1st trimester- on humulin 86u daily, basaglar 10u qhs  2. hypothyroidism on synthroid 80mcg  3. Obesity BMI 41.3    Vital Signs Last 24 Hrs  T(C): 37 (27 Oct 2021 06:20), Max: 37 (27 Oct 2021 06:20)  T(F): 98.6 (27 Oct 2021 06:20), Max: 98.6 (27 Oct 2021 06:20)  HR: 88 (27 Oct 2021 06:20) (70 - 103)  BP: 112/76 (27 Oct 2021 06:20) (95/62 - 125/64)  RR: 20 (27 Oct 2021 06:20) (15 - 20)  SpO2: 98% (27 Oct 2021 06:20) (94% - 99%)    General: Alert and oriented x3, no acute distress  Cardiovascular: regular rate and rhythm, no murmurs, rubs or gallops appreciated on exam  Respiratory: clear to auscultation bilaterally  Abdominal: gravid uterus, non-tender to palpation  Pelvic: deferred  Extremities: no redness, tenderness or swelling in lower extremities bilaterally   Overnight:   FHT: baseline 120s bpm, moderate variability, +accels, -deccels  [place]: no contractions overnight    Labs:                          9.5    13.15 )-----------( 205      ( 26 Oct 2021 17:20 )             28.2     10-    137  |  107  |  9.7  ----------------------------<  82  3.8   |  18.0<L>  |  0.41<L>    Ca    7.7<L>      26 Oct 2021 17:20    TPro  5.8<L>  /  Alb  2.9<L>  /  TBili  0.4  /  DBili  x   /  AST  33<H>  /  ALT  27  /  AlkPhos  107  10-    Amylase, Serum Total (10.26. @ 17:20)    Amylase, Serum Total: 603 U/L    Lipase, Serum (10. @ 17:20)    Lipase, Serum: 633 U/L    < from: US Pancreas (10.26.21 @ 03:35) >  FINDINGS: The liver is smooth in contour and homogeneous in echotexture. There is no intra or extrahepatic biliary duct dilatation. The common bile duct measures 5 mm.    The gallbladder is filled with numerous shadowing stones. Nogallbladder wall thickening or pericholecystic fluid is identified. A sonographic Williamson sign was not elicited. There is no free fluid in the right upper quadrant.    The pancreatic head and neck are well visualized and homogeneous in echotexture. Noperipancreatic fluid collection is identified. The pancreatic body and tail are obscured by bowel gas.    IMPRESSION: Gallstone filled gallbladder. No evidence of biliary dilatation. Unremarkable sonographic appearance of the visualized pancreatic head and neck.    < end of copied text >

## 2021-10-27 NOTE — PROGRESS NOTE ADULT - PROBLEM SELECTOR PLAN 5
SCDs while in bed  Subq heparin on HD#3 if still admitted and pregnant  No need for betamethasone due to gestational age. SCDs while in bed  Subq heparin on HD#3 if still admitted and pregnant  Not a candidate for betamethasone due to gestational age.

## 2021-10-28 ENCOUNTER — TRANSCRIPTION ENCOUNTER (OUTPATIENT)
Age: 31
End: 2021-10-28

## 2021-10-28 ENCOUNTER — APPOINTMENT (OUTPATIENT)
Dept: ANTEPARTUM | Facility: CLINIC | Age: 31
End: 2021-10-28

## 2021-10-28 LAB
ALBUMIN SERPL ELPH-MCNC: 2.8 G/DL — LOW (ref 3.3–5.2)
ALP SERPL-CCNC: 98 U/L — SIGNIFICANT CHANGE UP (ref 40–120)
ALT FLD-CCNC: 16 U/L — SIGNIFICANT CHANGE UP
ANION GAP SERPL CALC-SCNC: 13 MMOL/L — SIGNIFICANT CHANGE UP (ref 5–17)
AST SERPL-CCNC: 19 U/L — SIGNIFICANT CHANGE UP
BASOPHILS # BLD AUTO: 0.03 K/UL — SIGNIFICANT CHANGE UP (ref 0–0.2)
BASOPHILS NFR BLD AUTO: 0.3 % — SIGNIFICANT CHANGE UP (ref 0–2)
BILIRUB SERPL-MCNC: 0.3 MG/DL — LOW (ref 0.4–2)
BUN SERPL-MCNC: 6.8 MG/DL — LOW (ref 8–20)
CALCIUM SERPL-MCNC: 7.9 MG/DL — LOW (ref 8.6–10.2)
CHLORIDE SERPL-SCNC: 110 MMOL/L — HIGH (ref 98–107)
CO2 SERPL-SCNC: 16 MMOL/L — LOW (ref 22–29)
CREAT SERPL-MCNC: 0.39 MG/DL — LOW (ref 0.5–1.3)
EOSINOPHIL # BLD AUTO: 0.19 K/UL — SIGNIFICANT CHANGE UP (ref 0–0.5)
EOSINOPHIL NFR BLD AUTO: 2 % — SIGNIFICANT CHANGE UP (ref 0–6)
GLUCOSE BLDC GLUCOMTR-MCNC: 105 MG/DL — HIGH (ref 70–99)
GLUCOSE BLDC GLUCOMTR-MCNC: 58 MG/DL — LOW (ref 70–99)
GLUCOSE BLDC GLUCOMTR-MCNC: 62 MG/DL — LOW (ref 70–99)
GLUCOSE BLDC GLUCOMTR-MCNC: 66 MG/DL — LOW (ref 70–99)
GLUCOSE BLDC GLUCOMTR-MCNC: 66 MG/DL — LOW (ref 70–99)
GLUCOSE BLDC GLUCOMTR-MCNC: 70 MG/DL — SIGNIFICANT CHANGE UP (ref 70–99)
GLUCOSE BLDC GLUCOMTR-MCNC: 74 MG/DL — SIGNIFICANT CHANGE UP (ref 70–99)
GLUCOSE BLDC GLUCOMTR-MCNC: 77 MG/DL — SIGNIFICANT CHANGE UP (ref 70–99)
GLUCOSE BLDC GLUCOMTR-MCNC: 80 MG/DL — SIGNIFICANT CHANGE UP (ref 70–99)
GLUCOSE BLDC GLUCOMTR-MCNC: 86 MG/DL — SIGNIFICANT CHANGE UP (ref 70–99)
GLUCOSE BLDC GLUCOMTR-MCNC: 90 MG/DL — SIGNIFICANT CHANGE UP (ref 70–99)
GLUCOSE BLDC GLUCOMTR-MCNC: 90 MG/DL — SIGNIFICANT CHANGE UP (ref 70–99)
GLUCOSE BLDC GLUCOMTR-MCNC: 94 MG/DL — SIGNIFICANT CHANGE UP (ref 70–99)
GLUCOSE SERPL-MCNC: 79 MG/DL — SIGNIFICANT CHANGE UP (ref 70–99)
HCT VFR BLD CALC: 29.2 % — LOW (ref 34.5–45)
HGB BLD-MCNC: 9.5 G/DL — LOW (ref 11.5–15.5)
IMM GRANULOCYTES NFR BLD AUTO: 0.5 % — SIGNIFICANT CHANGE UP (ref 0–1.5)
LYMPHOCYTES # BLD AUTO: 1.75 K/UL — SIGNIFICANT CHANGE UP (ref 1–3.3)
LYMPHOCYTES # BLD AUTO: 18.3 % — SIGNIFICANT CHANGE UP (ref 13–44)
MCHC RBC-ENTMCNC: 30.5 PG — SIGNIFICANT CHANGE UP (ref 27–34)
MCHC RBC-ENTMCNC: 32.5 GM/DL — SIGNIFICANT CHANGE UP (ref 32–36)
MCV RBC AUTO: 93.9 FL — SIGNIFICANT CHANGE UP (ref 80–100)
MONOCYTES # BLD AUTO: 0.51 K/UL — SIGNIFICANT CHANGE UP (ref 0–0.9)
MONOCYTES NFR BLD AUTO: 5.3 % — SIGNIFICANT CHANGE UP (ref 2–14)
NEUTROPHILS # BLD AUTO: 7.03 K/UL — SIGNIFICANT CHANGE UP (ref 1.8–7.4)
NEUTROPHILS NFR BLD AUTO: 73.6 % — SIGNIFICANT CHANGE UP (ref 43–77)
PLATELET # BLD AUTO: 195 K/UL — SIGNIFICANT CHANGE UP (ref 150–400)
POTASSIUM SERPL-MCNC: 3.6 MMOL/L — SIGNIFICANT CHANGE UP (ref 3.5–5.3)
POTASSIUM SERPL-SCNC: 3.6 MMOL/L — SIGNIFICANT CHANGE UP (ref 3.5–5.3)
PROT SERPL-MCNC: 5.3 G/DL — LOW (ref 6.6–8.7)
RBC # BLD: 3.11 M/UL — LOW (ref 3.8–5.2)
RBC # FLD: 13.2 % — SIGNIFICANT CHANGE UP (ref 10.3–14.5)
SODIUM SERPL-SCNC: 139 MMOL/L — SIGNIFICANT CHANGE UP (ref 135–145)
WBC # BLD: 9.56 K/UL — SIGNIFICANT CHANGE UP (ref 3.8–10.5)
WBC # FLD AUTO: 9.56 K/UL — SIGNIFICANT CHANGE UP (ref 3.8–10.5)

## 2021-10-28 PROCEDURE — 99233 SBSQ HOSP IP/OBS HIGH 50: CPT | Mod: GC

## 2021-10-28 RX ORDER — SODIUM CHLORIDE 9 MG/ML
1000 INJECTION, SOLUTION INTRAVENOUS
Refills: 0 | Status: DISCONTINUED | OUTPATIENT
Start: 2021-10-28 | End: 2021-10-29

## 2021-10-28 RX ORDER — HUMAN INSULIN 100 [IU]/ML
60 INJECTION, SUSPENSION SUBCUTANEOUS AT BEDTIME
Refills: 0 | Status: DISCONTINUED | OUTPATIENT
Start: 2021-10-28 | End: 2021-10-29

## 2021-10-28 RX ORDER — DEXTROSE 50 % IN WATER 50 %
15 SYRINGE (ML) INTRAVENOUS ONCE
Refills: 0 | Status: COMPLETED | OUTPATIENT
Start: 2021-10-28 | End: 2021-10-28

## 2021-10-28 RX ORDER — ACETAMINOPHEN 500 MG
650 TABLET ORAL EVERY 6 HOURS
Refills: 0 | Status: DISCONTINUED | OUTPATIENT
Start: 2021-10-28 | End: 2021-10-29

## 2021-10-28 RX ORDER — SODIUM CHLORIDE 9 MG/ML
1000 INJECTION, SOLUTION INTRAVENOUS
Refills: 0 | Status: DISCONTINUED | OUTPATIENT
Start: 2021-10-28 | End: 2021-10-28

## 2021-10-28 RX ORDER — SODIUM CHLORIDE 9 MG/ML
1000 INJECTION INTRAMUSCULAR; INTRAVENOUS; SUBCUTANEOUS
Refills: 0 | Status: DISCONTINUED | OUTPATIENT
Start: 2021-10-28 | End: 2021-10-29

## 2021-10-28 RX ORDER — HEPARIN SODIUM 5000 [USP'U]/ML
5000 INJECTION INTRAVENOUS; SUBCUTANEOUS EVERY 12 HOURS
Refills: 0 | Status: DISCONTINUED | OUTPATIENT
Start: 2021-10-28 | End: 2021-10-29

## 2021-10-28 RX ORDER — INSULIN LISPRO 100/ML
VIAL (ML) SUBCUTANEOUS
Refills: 0 | Status: DISCONTINUED | OUTPATIENT
Start: 2021-10-28 | End: 2021-10-29

## 2021-10-28 RX ADMIN — SODIUM CHLORIDE 125 MILLILITER(S): 9 INJECTION INTRAMUSCULAR; INTRAVENOUS; SUBCUTANEOUS at 00:26

## 2021-10-28 RX ADMIN — HEPARIN SODIUM 5000 UNIT(S): 5000 INJECTION INTRAVENOUS; SUBCUTANEOUS at 17:08

## 2021-10-28 RX ADMIN — Medication 88 MICROGRAM(S): at 05:11

## 2021-10-28 RX ADMIN — Medication 15 GRAM(S): at 06:24

## 2021-10-28 RX ADMIN — FAMOTIDINE 20 MILLIGRAM(S): 10 INJECTION INTRAVENOUS at 05:11

## 2021-10-28 RX ADMIN — HUMAN INSULIN 60 UNIT(S): 100 INJECTION, SUSPENSION SUBCUTANEOUS at 21:57

## 2021-10-28 RX ADMIN — SODIUM CHLORIDE 125 MILLILITER(S): 9 INJECTION, SOLUTION INTRAVENOUS at 07:08

## 2021-10-28 RX ADMIN — Medication 10 MILLIGRAM(S): at 14:05

## 2021-10-28 RX ADMIN — Medication 15 GRAM(S): at 07:11

## 2021-10-28 RX ADMIN — ONDANSETRON 4 MILLIGRAM(S): 8 TABLET, FILM COATED ORAL at 17:13

## 2021-10-28 NOTE — DISCHARGE NOTE ANTEPARTUM - CARE PROVIDER_API CALL
Shaina Yeung  OBSTETRICS AND GYNECOLOGY  [address] [place] [address]  Phone: [phone]  Fax: [phone]  Follow Up Time: 1 week

## 2021-10-28 NOTE — CHART NOTE - NSCHARTNOTEFT_GEN_A_CORE
Provider contacted by nursing due to multiple patient complaints. Patient seen and examined at bedside. Patient reports feeling somewhat dizzy after her last dose of dilaudid. She denies feeling like she is going to pass out. She reports feeling nauseated and in pain. She reports feeling hungry and she desires to eat. Patient counseled extensively on typical course of pancreatitis and need for continued NPO status. Patient offered heat packs, anti-emetics and blankets for symptomatic relief. Patient reports agreement with and understanding of plan.    Vital Signs Last 24 Hrs  T(C): 36.6 (26 Oct 2021 20:49), Max: 36.9 (26 Oct 2021 11:05)  T(F): 97.88 (26 Oct 2021 20:49), Max: 98.42 (26 Oct 2021 11:05)  HR: 82 (26 Oct 2021 22:15) (70 - 103)  BP: 105/72 (26 Oct 2021 22:15) (95/62 - 132/85)  RR: 18 (26 Oct 2021 22:15) (15 - 18)  SpO2: 96% (26 Oct 2021 22:15) (94% - 99%)    Discussed with Dr. Finch
Patient seen and evaluated during morning rounds. Patient denies vaginal bleeding, contractions and leakage of fluid. She endorses good fetal movement. Denies fevers, chills, nausea and vomiting. No other complaints at this time.     Vital Signs Last 24 Hrs  T(C): 36.5 (28 Oct 2021 07:14), Max: 36.8 (27 Oct 2021 12:15)  T(F): 97.7 (28 Oct 2021 07:14), Max: 98.2 (27 Oct 2021 12:15)  HR: 73 (28 Oct 2021 07:14) (69 - 93)  BP: 98/66 (28 Oct 2021 07:14) (91/59 - 122/88)  RR: 16 (28 Oct 2021 07:14) (16 - 18)  SpO2: 98% (28 Oct 2021 07:14) (95% - 98%)    Gen: NAD  Abd: Soft, non-tender, gravid   FHT: baseline 120s, moderate variability, +accels, -decels   [place]: uterine irritability   Bedside sono: cephalic, anterior, WHITNEY 15, fetal movement and breathing noted, good fetal tone                           9.5    9.56  )-----------( 195      ( 28 Oct 2021 07:13 )             29.2     10-28    139  |  110<H>  |  6.8<L>  ----------------------------<  79  3.6   |  16.0<L>  |  0.39<L>    Ca    7.9<L>      28 Oct 2021 07:13    TPro  5.3<L>  /  Alb  2.8<L>  /  TBili  0.3<L>  /  DBili  x   /  AST  19  /  ALT  16  /  AlkPhos  98  10-28      Patient admitted for conservative management of acute pancreatitis. VSS, labs stable. BPP 10/10. Continue current management.     D/W Dr. Yeung

## 2021-10-28 NOTE — PROGRESS NOTE ADULT - PROBLEM SELECTOR PLAN 5
SCDs while in bed  Subq heparin on HD#3 if still admitted and pregnant  Not a candidate for betamethasone due to gestational age.

## 2021-10-28 NOTE — PROGRESS NOTE ADULT - PROBLEM SELECTOR PLAN 2
Diagnosed during 1st trimester  Patient's home regimen is Humulin 86u qHS and Basaglar 8u qHS began on 10/27  Will perform q4hr fingersticks while NPO  IV fluids NS if finger stick is >100 and D5NS if fingerstick is <100  Continue fasting, postprandial and bedtime FS   S/p hypoglycemic episode this AM, s/p glucola and apple juice

## 2021-10-28 NOTE — PROGRESS NOTE ADULT - PROBLEM SELECTOR PLAN 1
AST/ALT, amylase/lipase elevated, but downtrending   Clear liquid diet on 10/27, will advance as tolerated   Continue maintenance fluids at 125 ml/hr  Pain controlled with dilaudid 1mg q4h PRN   RUQ sonogram showing gallstones without obstruction   Surgery consulted and recommendations appreciated

## 2021-10-28 NOTE — DISCHARGE NOTE ANTEPARTUM - MEDICATION SUMMARY - MEDICATIONS TO TAKE
I will START or STAY ON the medications listed below when I get home from the hospital:    HumuLIN N KwikPen 100 units/mL subcutaneous suspension  -- 50 unit(s) subcutaneous once a day (at bedtime) MDD:50 units  -- Do not drink alcoholic beverages when taking this medication.  It is very important that you take or use this exactly as directed.  Do not skip doses or discontinue unless directed by your doctor.  Keep in refrigerator.  Do not freeze.    -- Indication: For Diabetes    levothyroxine 88 mcg (0.088 mg) oral tablet  -- 1 tab(s) by mouth once a day  -- Indication: For Hypothyroidism

## 2021-10-28 NOTE — DISCHARGE NOTE ANTEPARTUM - PATIENT PORTAL LINK FT
You can access the FollowMyHealth Patient Portal offered by [facility] by registering at the following website: [email or web address]. By joining [facility]’s FollowMyHealth portal, you will also be able to view your health information using other applications (apps) compatible with our system.

## 2021-10-28 NOTE — DISCHARGE NOTE ANTEPARTUM - HOSPITAL COURSE
Patient admitted to antepartum service for management of acute pancreatitis. Ultrasound showed cholelithiasis and it was postulated that pancreatitis was 2/2 to cholelithiasis. Surgery recommended an elective cholecystectomy after delivery. Patient's pain is improved and she is able to tolerate PO intake, all lab values and vital signs stable prior to discharge.

## 2021-10-28 NOTE — DISCHARGE NOTE ANTEPARTUM - PLAN OF CARE
Patient admitted to antepartum service for acute pancreatitis. She was treated with conservative management. Surgery recommended for an elective cholecystectomy after delivery.

## 2021-10-28 NOTE — DISCHARGE NOTE ANTEPARTUM - CARE PLAN
1 Principal Discharge DX:	Acute pancreatitis  Assessment and plan of treatment:	Patient admitted to antepartum service for acute pancreatitis. She was treated with conservative management. Surgery recommended for an elective cholecystectomy after delivery.

## 2021-10-28 NOTE — PROGRESS NOTE ADULT - SUBJECTIVE AND OBJECTIVE BOX
JOSEP CARRANZA  A 31year old  Last Menstrual Period 21  EDC 21 at 37 weeks 1 day Gestational Age    Subjective:  This morning, patient had a hypoglycemic episode during her fasting FS (62). During episode, patient endorses feeling "off" and "hot."  Patient denies vaginal bleeding, contractions and leakage of fluid. She endorses good fetal movement.   Tolerating clear liquid diet.   +Flatus/+Voiding.   Denies nausea, vomiting, fevers, chills.     This pregnancy has been complicated by:  1. DM2 diagnosed in 1st trimester- on humulin 86u daily, basaglar 10u qhs  2. Hypothyroidism on synthroid 80mcg  3. Obesity BMI 41.3    Vital Signs Last 24 Hrs  T(C): 36.3 (28 Oct 2021 04:20), Max: 36.8 (27 Oct 2021 12:15)  T(F): 97.4 (28 Oct 2021 04:20), Max: 98.2 (27 Oct 2021 12:15)  HR: 78 (28 Oct 2021 04:20) (63 - 93)  BP: 94/61 (28 Oct 2021 04:20) (91/59 - 122/88)  RR: 18 (28 Oct 2021 04:20) (18 - 20)  SpO2: 96% (28 Oct 2021 04:20) (92% - 98%)    General: Alert and oriented x3, no acute distress  Cardiovascular: regular rate and rhythm, no murmurs, rubs or gallops appreciated on exam  Respiratory: clear to auscultation bilaterally  Abdominal: gravid uterus, non-tender to palpation  Pelvic: deferred  Extremities: no redness, tenderness or swelling in lower extremities bilaterally   Overnight:   FHT: baseline 130s bpm, moderate variability, +accels, -deccels  [place]: no contractions overnight    Labs:                        9.0    10.62 )-----------( 174      ( 27 Oct 2021 07:30 )             27.7     10    136  |  106  |  8.6  ----------------------------<  100<H>  3.8   |  17.0<L>  |  0.37<L>    Ca    7.8<L>      27 Oct 2021 07:30    TPro  5.3<L>  /  Alb  3.0<L>  /  TBili  0.4  /  DBili  x   /  AST  25  /  ALT  23  /  AlkPhos  103  10-27    Amylase, Serum Total (10.26.21 @ 17:20)    Amylase, Serum Total: 603 U/L    Lipase, Serum (10.26.21 @ 17:20)    Lipase, Serum: 633 U/L    < from: US Pancreas (10.26.21 @ 03:35) >  FINDINGS: The liver is smooth in contour and homogeneous in echotexture. There is no intra or extrahepatic biliary duct dilatation. The common bile duct measures 5 mm.    The gallbladder is filled with numerous shadowing stones. Nogallbladder wall thickening or pericholecystic fluid is identified. A sonographic Williamson sign was not elicited. There is no free fluid in the right upper quadrant.    The pancreatic head and neck are well visualized and homogeneous in echotexture. Noperipancreatic fluid collection is identified. The pancreatic body and tail are obscured by bowel gas.    IMPRESSION: Gallstone filled gallbladder. No evidence of biliary dilatation. Unremarkable sonographic appearance of the visualized pancreatic head and neck.    < end of copied text >

## 2021-10-29 VITALS
SYSTOLIC BLOOD PRESSURE: 111 MMHG | RESPIRATION RATE: 18 BRPM | DIASTOLIC BLOOD PRESSURE: 74 MMHG | HEART RATE: 82 BPM | OXYGEN SATURATION: 98 % | TEMPERATURE: 99 F

## 2021-10-29 LAB
ALBUMIN SERPL ELPH-MCNC: 2.8 G/DL — LOW (ref 3.3–5.2)
ALP SERPL-CCNC: 92 U/L — SIGNIFICANT CHANGE UP (ref 40–120)
ALT FLD-CCNC: 13 U/L — SIGNIFICANT CHANGE UP
ANION GAP SERPL CALC-SCNC: 12 MMOL/L — SIGNIFICANT CHANGE UP (ref 5–17)
AST SERPL-CCNC: 13 U/L — SIGNIFICANT CHANGE UP
BILIRUB SERPL-MCNC: <0.2 MG/DL — LOW (ref 0.4–2)
BUN SERPL-MCNC: 6.9 MG/DL — LOW (ref 8–20)
CALCIUM SERPL-MCNC: 8.3 MG/DL — LOW (ref 8.6–10.2)
CHLORIDE SERPL-SCNC: 107 MMOL/L — SIGNIFICANT CHANGE UP (ref 98–107)
CO2 SERPL-SCNC: 18 MMOL/L — LOW (ref 22–29)
CREAT SERPL-MCNC: 0.47 MG/DL — LOW (ref 0.5–1.3)
GLUCOSE BLDC GLUCOMTR-MCNC: 56 MG/DL — LOW (ref 70–99)
GLUCOSE BLDC GLUCOMTR-MCNC: 61 MG/DL — LOW (ref 70–99)
GLUCOSE BLDC GLUCOMTR-MCNC: 85 MG/DL — SIGNIFICANT CHANGE UP (ref 70–99)
GLUCOSE BLDC GLUCOMTR-MCNC: 85 MG/DL — SIGNIFICANT CHANGE UP (ref 70–99)
GLUCOSE SERPL-MCNC: 56 MG/DL — LOW (ref 70–99)
HCT VFR BLD CALC: 27.7 % — LOW (ref 34.5–45)
HGB BLD-MCNC: 9.4 G/DL — LOW (ref 11.5–15.5)
MCHC RBC-ENTMCNC: 31.2 PG — SIGNIFICANT CHANGE UP (ref 27–34)
MCHC RBC-ENTMCNC: 33.9 GM/DL — SIGNIFICANT CHANGE UP (ref 32–36)
MCV RBC AUTO: 92 FL — SIGNIFICANT CHANGE UP (ref 80–100)
PLATELET # BLD AUTO: 195 K/UL — SIGNIFICANT CHANGE UP (ref 150–400)
POTASSIUM SERPL-MCNC: 3.7 MMOL/L — SIGNIFICANT CHANGE UP (ref 3.5–5.3)
POTASSIUM SERPL-SCNC: 3.7 MMOL/L — SIGNIFICANT CHANGE UP (ref 3.5–5.3)
PROT SERPL-MCNC: 5.3 G/DL — LOW (ref 6.6–8.7)
RBC # BLD: 3.01 M/UL — LOW (ref 3.8–5.2)
RBC # FLD: 13.3 % — SIGNIFICANT CHANGE UP (ref 10.3–14.5)
SODIUM SERPL-SCNC: 136 MMOL/L — SIGNIFICANT CHANGE UP (ref 135–145)
WBC # BLD: 8.76 K/UL — SIGNIFICANT CHANGE UP (ref 3.8–10.5)
WBC # FLD AUTO: 8.76 K/UL — SIGNIFICANT CHANGE UP (ref 3.8–10.5)

## 2021-10-29 PROCEDURE — 81001 URINALYSIS AUTO W/SCOPE: CPT

## 2021-10-29 PROCEDURE — 85027 COMPLETE CBC AUTOMATED: CPT

## 2021-10-29 PROCEDURE — 84478 ASSAY OF TRIGLYCERIDES: CPT

## 2021-10-29 PROCEDURE — 87635 SARS-COV-2 COVID-19 AMP PRB: CPT

## 2021-10-29 PROCEDURE — 82239 BILE ACIDS TOTAL: CPT

## 2021-10-29 PROCEDURE — 86900 BLOOD TYPING SEROLOGIC ABO: CPT

## 2021-10-29 PROCEDURE — 36415 COLL VENOUS BLD VENIPUNCTURE: CPT

## 2021-10-29 PROCEDURE — 86769 SARS-COV-2 COVID-19 ANTIBODY: CPT

## 2021-10-29 PROCEDURE — 84156 ASSAY OF PROTEIN URINE: CPT

## 2021-10-29 PROCEDURE — 93005 ELECTROCARDIOGRAM TRACING: CPT

## 2021-10-29 PROCEDURE — 82570 ASSAY OF URINE CREATININE: CPT

## 2021-10-29 PROCEDURE — 86901 BLOOD TYPING SEROLOGIC RH(D): CPT

## 2021-10-29 PROCEDURE — 82150 ASSAY OF AMYLASE: CPT

## 2021-10-29 PROCEDURE — 99233 SBSQ HOSP IP/OBS HIGH 50: CPT | Mod: GC

## 2021-10-29 PROCEDURE — 82962 GLUCOSE BLOOD TEST: CPT

## 2021-10-29 PROCEDURE — 83690 ASSAY OF LIPASE: CPT

## 2021-10-29 PROCEDURE — 83036 HEMOGLOBIN GLYCOSYLATED A1C: CPT

## 2021-10-29 PROCEDURE — 85025 COMPLETE CBC W/AUTO DIFF WBC: CPT

## 2021-10-29 PROCEDURE — 80053 COMPREHEN METABOLIC PANEL: CPT

## 2021-10-29 PROCEDURE — 86780 TREPONEMA PALLIDUM: CPT

## 2021-10-29 PROCEDURE — 86850 RBC ANTIBODY SCREEN: CPT

## 2021-10-29 RX ORDER — DEXTROSE 50 % IN WATER 50 %
15 SYRINGE (ML) INTRAVENOUS ONCE
Refills: 0 | Status: COMPLETED | OUTPATIENT
Start: 2021-10-29 | End: 2021-10-29

## 2021-10-29 RX ORDER — HUMAN INSULIN 100 [IU]/ML
50 INJECTION, SUSPENSION SUBCUTANEOUS
Qty: 15 | Refills: 0
Start: 2021-10-29 | End: 2021-11-27

## 2021-10-29 RX ORDER — LEVOTHYROXINE SODIUM 125 MCG
1 TABLET ORAL
Qty: 0 | Refills: 0 | DISCHARGE
Start: 2021-10-29

## 2021-10-29 RX ADMIN — HEPARIN SODIUM 5000 UNIT(S): 5000 INJECTION INTRAVENOUS; SUBCUTANEOUS at 05:23

## 2021-10-29 RX ADMIN — FAMOTIDINE 20 MILLIGRAM(S): 10 INJECTION INTRAVENOUS at 05:24

## 2021-10-29 RX ADMIN — Medication 88 MICROGRAM(S): at 05:24

## 2021-10-29 NOTE — PROGRESS NOTE ADULT - SUBJECTIVE AND OBJECTIVE BOX
JOSEP CARRANZA  A 31year old  Last Menstrual Period 21  EDC 21 at 37 weeks 3 day Gestational Age    Subjective:  Patient doing well and without complaints this AM.   Patient denies vaginal bleeding, contractions and leakage of fluid. She endorses good fetal movement.   Tolerating PO diet.   +Flatus/+BM/+Voiding.   Denies nausea, vomiting, fevers, chills.     This pregnancy has been complicated by:  1. DM2 diagnosed in 1st trimester- previously on humulin 86u daily, basaglar 8u qhs  2. Hypothyroidism on synthroid 80mcg  3. Obesity BMI 41.3    Vital Signs Last 24 Hrs  T(C): 36.6 (29 Oct 2021 03:49), Max: 37 (28 Oct 2021 12:14)  T(F): 97.9 (29 Oct 2021 03:49), Max: 98.6 (28 Oct 2021 12:14)  HR: 72 (29 Oct 2021 03:49) (72 - 88)  BP: 107/71 (29 Oct 2021 03:49) (98/66 - 120/80)  RR: 16 (29 Oct 2021 03:49) (16 - 18)  SpO2: 96% (29 Oct 2021 03:49) (96% - 98%)    General: Alert and oriented x3, no acute distress  Cardiovascular: regular rate and rhythm, no murmurs, rubs or gallops appreciated on exam  Respiratory: clear to auscultation bilaterally  Abdominal: gravid uterus, non-tender to palpation  Pelvic: deferred  Extremities: no redness, tenderness or swelling in lower extremities bilaterally   Overnight:   FHT: baseline 120s bpm, moderate variability, +accels, -deccels  [place]: no contractions overnight    Labs:                          9.5    9.56  )-----------( 195      ( 28 Oct 2021 07:13 )             29.2     10-28    139  |  110<H>  |  6.8<L>  ----------------------------<  79  3.6   |  16.0<L>  |  0.39<L>    Ca    7.9<L>      28 Oct 2021 07:13    TPro  5.3<L>  /  Alb  2.8<L>  /  TBili  0.3<L>  /  DBili  x   /  AST  19  /  ALT  16  /  AlkPhos  98  10-28             Amylase, Serum Total (10.. @ 17:20)    Amylase, Serum Total: 603 U/L    Lipase, Serum (10. @ 17:20)    Lipase, Serum: 633 U/L    < from: US Pancreas (10.26.21 @ 03:35) >  FINDINGS: The liver is smooth in contour and homogeneous in echotexture. There is no intra or extrahepatic biliary duct dilatation. The common bile duct measures 5 mm.    The gallbladder is filled with numerous shadowing stones. Nogallbladder wall thickening or pericholecystic fluid is identified. A sonographic Williamson sign was not elicited. There is no free fluid in the right upper quadrant.    The pancreatic head and neck are well visualized and homogeneous in echotexture. Noperipancreatic fluid collection is identified. The pancreatic body and tail are obscured by bowel gas.    IMPRESSION: Gallstone filled gallbladder. No evidence of biliary dilatation. Unremarkable sonographic appearance of the visualized pancreatic head and neck.    < end of copied text >

## 2021-10-29 NOTE — PROGRESS NOTE ADULT - PROBLEM SELECTOR PROBLEM 2
Diabetes mellitus affecting pregnancy in third trimester

## 2021-10-29 NOTE — PROGRESS NOTE ADULT - PROBLEM SELECTOR PROBLEM 6
37 weeks gestation of pregnancy

## 2021-10-29 NOTE — PROGRESS NOTE ADULT - PROBLEM SELECTOR PLAN 2
Diagnosed during 1st trimester  Continue humulin 60u QHS  Continue fasting, preprandial, postprandial and bedtime FS   FS on 10/28 as follows: fasting (66), pre-breakfast (66>70), post-breakfast (90), pre-lunch (90), post-lunch (74), pre-dinner (77), post-dinner (94), bedtime (105)

## 2021-10-29 NOTE — PROGRESS NOTE ADULT - PROBLEM SELECTOR PLAN 3
Patient with hypothyroidism  Continue home synthroid dose of 88mcg daily  Patient follows with endocrinologist.

## 2021-10-29 NOTE — PROGRESS NOTE ADULT - PROBLEM SELECTOR PROBLEM 3
Hypothyroidism affecting pregnancy, antepartum, third trimester

## 2021-10-29 NOTE — PROGRESS NOTE ADULT - PROBLEM SELECTOR PLAN 4
BMI 41.3, no acute management required

## 2021-10-29 NOTE — PROGRESS NOTE ADULT - ATTENDING COMMENTS
M Attending    30yo  at 37 3/7 weeks admitted with pancreatitis, now HD#4 and clinically improved. Pregnancy also complicated by type 2 diabetes, hypothyroidism, and class 3 obesity. She has no complaints at this time. In the morning she had a hypoglycemic episode. Her bedtime insulin was reduced. She is scheduled for twice weekly fetal testing at the [facility] office with plan for delivery at 39 weeks. Stable for d/c home today.    NACHO Dela Cruz
MFM - IUP at 37 weeks admitted for management of acute pancreatitis.  Patient seen and evaluated while on L&D and reports worsening pain despite IV Dilaudid.  Also with some chest tightness.  No vaginal bleeding, LOF or OB complaint.  RUQ sono significant for cholelithiasis making gallstone pancreatitis likely etiology.  No evidence of obstructing stone at this time.  Appreciated surgical consultation.  Plan to continue supportive therapy with IVF, NPO, analgesia and trend labs (CBC/CMP).  Fetal status reassuring.  Continue q4 hours BG evaluation with ISS while patient NPO.  Dilaudid 1 mg q4hrs ATC for 24 hours (patient may decline) with additional Dilaudid for breakthrough.  Will continue synthroid.  Surgical intervention if no improvement or worsening maternal status.  We reviewed role of proceeding with delivery for routine indications and if surgical intervention warranted.  All questions answered.  Patient and partner voiced understanding of care plan and management and are in agreement.   Shaina Yeung MD  Maternal Fetal Medicine
MFM - IUP at 37+ weeks admitted with gallstone pancreatitis in pregnancy complicated by A2GDM, anemia, elevated BMI, and hypothyroidism.  All conditions present on admission.  Pain much improved and diet advanced to full carb consistent.  Patient reports some 'gas pain' after eating but has not required IV analgesia.  No OB complaint.  NST this am reactive without contractions.  Bedside BPP 8/8 with normal amniotic fluid volume.  Evening NPH/Basaglar administered yesterday and patient with fasting hypoglycemia this am.  Will transition to fasting, premeal, and 1hr postprandial FS with premeal correction for values >100.  Will adjust evening NPH to 60 units and discontinue Basaglar at this time.  Patient advised to ambulate and notified will start heparin for VTE chemoprophylaxis. Continue NST BID.   Shaina Yeung MD  Maternal Fetal Medicine
MFM - IUP at 37+ weeks admitted for management of pancreatitis.  Pregnancy complicated by elevated BMI, cholelithiasis, hypothyroidism, and early diagnsosis of GDM.  Reports some improvement in pain.  Episodes of dizziness and pruritus which she attributes to dilaudid.  Endorses hunger. No OB complaint.  NST this am reactive with uterine irritability.  Labs improved.  BG values have been <100 without need for insulin therapy.  Abodmen soft, obese, nontender.  No LE edema appreciated.  Will discuss advancing diet with surgical team.  Decrease IVF to 125 cc/hr.  Transition to prn analagesia.  Continue FS with ISS coverage while NPO.  Continue inpatient management.   Shaina Yeung MD  Maternal Fetal Medicine

## 2021-10-29 NOTE — PROGRESS NOTE ADULT - ASSESSMENT
30yo  at 37wks by LMP admitted for symptomatic management of pancreatitis.
32yo  at 37w3d by LMP admitted for symptomatic management of pancreatitis. HD#4. 
30yo  at 37w2d by LMP admitted for symptomatic management of pancreatitis. HD#4. 
32yo  at 37w1d by LMP admitted for symptomatic management of pancreatitis. HD#3.

## 2021-10-29 NOTE — PROGRESS NOTE ADULT - PROBLEM SELECTOR PROBLEM 4
Obesity affecting pregnancy, antepartum, third trimester

## 2021-10-29 NOTE — PROGRESS NOTE ADULT - PROBLEM SELECTOR PLAN 6
NST reactive  Continue qShift NST  Continue PNVs  Continue baby Aspirin  BPP 10/28 10/10  Last MFM growth scan 10/21- Vtx, anterior placenta, Weight 2621g 24th%ile, BPP 10/10.
NST reactive  Continue qShift NST  Continue PNV  Continue baby Aspirin  Last MFM growth scan 10/21- Vtx, anterior placenta, Weight 2621g 24th%ile, BPP 10/10.

## 2021-10-29 NOTE — PROGRESS NOTE ADULT - PROBLEM SELECTOR PLAN 1
AST/ALT, amylase/lipase elevated, but downtrending   Continue PO diet   Does not require pain medications   RUQ sonogram showing gallstones without obstruction   Surgery consulted and recommendations appreciated

## 2021-10-30 LAB — BILE AC FLD-MCNC: 3.2 UMOL/L — SIGNIFICANT CHANGE UP (ref 0–10)

## 2021-11-01 ENCOUNTER — APPOINTMENT (OUTPATIENT)
Dept: OBGYN | Facility: CLINIC | Age: 31
End: 2021-11-01
Payer: MEDICAID

## 2021-11-01 ENCOUNTER — ASOB RESULT (OUTPATIENT)
Age: 31
End: 2021-11-01

## 2021-11-01 ENCOUNTER — APPOINTMENT (OUTPATIENT)
Dept: MATERNAL FETAL MEDICINE | Facility: CLINIC | Age: 31
End: 2021-11-01
Payer: MEDICAID

## 2021-11-01 VITALS
SYSTOLIC BLOOD PRESSURE: 132 MMHG | WEIGHT: 272 LBS | BODY MASS INDEX: 43.71 KG/M2 | HEIGHT: 66 IN | DIASTOLIC BLOOD PRESSURE: 70 MMHG

## 2021-11-01 DIAGNOSIS — O26.893 OTHER SPECIFIED PREGNANCY RELATED CONDITIONS, THIRD TRIMESTER: ICD-10-CM

## 2021-11-01 DIAGNOSIS — R10.2 OTHER SPECIFIED PREGNANCY RELATED CONDITIONS, THIRD TRIMESTER: ICD-10-CM

## 2021-11-01 DIAGNOSIS — Z3A.35 35 WEEKS GESTATION OF PREGNANCY: ICD-10-CM

## 2021-11-01 PROCEDURE — 0502F SUBSEQUENT PRENATAL CARE: CPT

## 2021-11-01 PROCEDURE — 76818 FETAL BIOPHYS PROFILE W/NST: CPT

## 2021-11-01 PROCEDURE — G0108 DIAB MANAGE TRN  PER INDIV: CPT | Mod: 95

## 2021-11-02 ENCOUNTER — NON-APPOINTMENT (OUTPATIENT)
Age: 31
End: 2021-11-02

## 2021-11-04 ENCOUNTER — APPOINTMENT (OUTPATIENT)
Dept: ANTEPARTUM | Facility: CLINIC | Age: 31
End: 2021-11-04
Payer: MEDICAID

## 2021-11-04 ENCOUNTER — NON-APPOINTMENT (OUTPATIENT)
Age: 31
End: 2021-11-04

## 2021-11-04 ENCOUNTER — ASOB RESULT (OUTPATIENT)
Age: 31
End: 2021-11-04

## 2021-11-04 PROCEDURE — 93976 VASCULAR STUDY: CPT

## 2021-11-04 PROCEDURE — 76820 UMBILICAL ARTERY ECHO: CPT

## 2021-11-04 PROCEDURE — 76818 FETAL BIOPHYS PROFILE W/NST: CPT

## 2021-11-05 DIAGNOSIS — Z01.818 ENCOUNTER FOR OTHER PREPROCEDURAL EXAMINATION: ICD-10-CM

## 2021-11-06 ENCOUNTER — APPOINTMENT (OUTPATIENT)
Dept: DISASTER EMERGENCY | Facility: CLINIC | Age: 31
End: 2021-11-06

## 2021-11-07 LAB — SARS-COV-2 N GENE NPH QL NAA+PROBE: NOT DETECTED

## 2021-11-08 ENCOUNTER — OUTPATIENT (OUTPATIENT)
Dept: OUTPATIENT SERVICES | Facility: HOSPITAL | Age: 31
LOS: 1 days | End: 2021-11-08
Payer: MEDICAID

## 2021-11-08 ENCOUNTER — APPOINTMENT (OUTPATIENT)
Dept: OBGYN | Facility: CLINIC | Age: 31
End: 2021-11-08
Payer: MEDICAID

## 2021-11-08 ENCOUNTER — ASOB RESULT (OUTPATIENT)
Age: 31
End: 2021-11-08

## 2021-11-08 VITALS
HEART RATE: 79 BPM | TEMPERATURE: 98 F | DIASTOLIC BLOOD PRESSURE: 81 MMHG | OXYGEN SATURATION: 98 % | WEIGHT: 255.74 LBS | HEIGHT: 66 IN | RESPIRATION RATE: 20 BRPM | SYSTOLIC BLOOD PRESSURE: 115 MMHG

## 2021-11-08 VITALS — DIASTOLIC BLOOD PRESSURE: 70 MMHG | SYSTOLIC BLOOD PRESSURE: 120 MMHG

## 2021-11-08 DIAGNOSIS — Z01.818 ENCOUNTER FOR OTHER PREPROCEDURAL EXAMINATION: ICD-10-CM

## 2021-11-08 DIAGNOSIS — K08.409 PARTIAL LOSS OF TEETH, UNSPECIFIED CAUSE, UNSPECIFIED CLASS: Chronic | ICD-10-CM

## 2021-11-08 DIAGNOSIS — Z29.9 ENCOUNTER FOR PROPHYLACTIC MEASURES, UNSPECIFIED: ICD-10-CM

## 2021-11-08 DIAGNOSIS — Z98.890 OTHER SPECIFIED POSTPROCEDURAL STATES: Chronic | ICD-10-CM

## 2021-11-08 DIAGNOSIS — R10.11 RIGHT UPPER QUADRANT PAIN: ICD-10-CM

## 2021-11-08 DIAGNOSIS — O24.113 PRE-EXISTING TYPE 2 DIABETES MELLITUS, IN PREGNANCY, THIRD TRIMESTER: ICD-10-CM

## 2021-11-08 DIAGNOSIS — K80.20 CALCULUS OF GALLBLADDER WITHOUT CHOLECYSTITIS WITHOUT OBSTRUCTION: ICD-10-CM

## 2021-11-08 DIAGNOSIS — Z13.89 ENCOUNTER FOR SCREENING FOR OTHER DISORDER: ICD-10-CM

## 2021-11-08 LAB
A1C WITH ESTIMATED AVERAGE GLUCOSE RESULT: 5.2 % — SIGNIFICANT CHANGE UP (ref 4–5.6)
ALBUMIN SERPL ELPH-MCNC: 3.6 G/DL — SIGNIFICANT CHANGE UP (ref 3.3–5.2)
ALP SERPL-CCNC: 123 U/L — HIGH (ref 40–120)
ALT FLD-CCNC: 9 U/L — SIGNIFICANT CHANGE UP
AMYLASE P1 CFR SERPL: 49 U/L — SIGNIFICANT CHANGE UP (ref 36–128)
ANION GAP SERPL CALC-SCNC: 13 MMOL/L — SIGNIFICANT CHANGE UP (ref 5–17)
APTT BLD: 29.7 SEC — SIGNIFICANT CHANGE UP (ref 27.5–35.5)
AST SERPL-CCNC: 13 U/L — SIGNIFICANT CHANGE UP
BASOPHILS # BLD AUTO: 0.04 K/UL — SIGNIFICANT CHANGE UP (ref 0–0.2)
BASOPHILS NFR BLD AUTO: 0.3 % — SIGNIFICANT CHANGE UP (ref 0–2)
BILIRUB SERPL-MCNC: 0.3 MG/DL — LOW (ref 0.4–2)
BILIRUB UR QL STRIP: NORMAL
BLD GP AB SCN SERPL QL: SIGNIFICANT CHANGE UP
BUN SERPL-MCNC: 8.1 MG/DL — SIGNIFICANT CHANGE UP (ref 8–20)
CALCIUM SERPL-MCNC: 8.5 MG/DL — LOW (ref 8.6–10.2)
CHLORIDE SERPL-SCNC: 102 MMOL/L — SIGNIFICANT CHANGE UP (ref 98–107)
CO2 SERPL-SCNC: 19 MMOL/L — LOW (ref 22–29)
COMMENT - BLOOD BANK: SIGNIFICANT CHANGE UP
CREAT SERPL-MCNC: 0.42 MG/DL — LOW (ref 0.5–1.3)
EOSINOPHIL # BLD AUTO: 0.13 K/UL — SIGNIFICANT CHANGE UP (ref 0–0.5)
EOSINOPHIL NFR BLD AUTO: 1.1 % — SIGNIFICANT CHANGE UP (ref 0–6)
ESTIMATED AVERAGE GLUCOSE: 103 MG/DL — SIGNIFICANT CHANGE UP (ref 68–114)
GLUCOSE SERPL-MCNC: 97 MG/DL — SIGNIFICANT CHANGE UP (ref 70–99)
GLUCOSE UR-MCNC: NORMAL
HCG UR QL: 0.2 EU/DL
HCT VFR BLD CALC: 32 % — LOW (ref 34.5–45)
HGB BLD-MCNC: 10.7 G/DL — LOW (ref 11.5–15.5)
HGB UR QL STRIP.AUTO: NORMAL
IMM GRANULOCYTES NFR BLD AUTO: 0.6 % — SIGNIFICANT CHANGE UP (ref 0–1.5)
INR BLD: 1.07 RATIO — SIGNIFICANT CHANGE UP (ref 0.88–1.16)
KETONES UR-MCNC: NORMAL
LEUKOCYTE ESTERASE UR QL STRIP: ABNORMAL
LIDOCAIN IGE QN: 20 U/L — LOW (ref 22–51)
LYMPHOCYTES # BLD AUTO: 16.9 % — SIGNIFICANT CHANGE UP (ref 13–44)
LYMPHOCYTES # BLD AUTO: 2.06 K/UL — SIGNIFICANT CHANGE UP (ref 1–3.3)
MCHC RBC-ENTMCNC: 30.4 PG — SIGNIFICANT CHANGE UP (ref 27–34)
MCHC RBC-ENTMCNC: 33.4 GM/DL — SIGNIFICANT CHANGE UP (ref 32–36)
MCV RBC AUTO: 90.9 FL — SIGNIFICANT CHANGE UP (ref 80–100)
MONOCYTES # BLD AUTO: 0.73 K/UL — SIGNIFICANT CHANGE UP (ref 0–0.9)
MONOCYTES NFR BLD AUTO: 6 % — SIGNIFICANT CHANGE UP (ref 2–14)
NEUTROPHILS # BLD AUTO: 9.16 K/UL — HIGH (ref 1.8–7.4)
NEUTROPHILS NFR BLD AUTO: 75.1 % — SIGNIFICANT CHANGE UP (ref 43–77)
NITRITE UR QL STRIP: NORMAL
PH UR STRIP: 7
PLATELET # BLD AUTO: 267 K/UL — SIGNIFICANT CHANGE UP (ref 150–400)
POTASSIUM SERPL-MCNC: 3.9 MMOL/L — SIGNIFICANT CHANGE UP (ref 3.5–5.3)
POTASSIUM SERPL-SCNC: 3.9 MMOL/L — SIGNIFICANT CHANGE UP (ref 3.5–5.3)
PROT SERPL-MCNC: 7.1 G/DL — SIGNIFICANT CHANGE UP (ref 6.6–8.7)
PROT UR STRIP-MCNC: NORMAL
PROTHROM AB SERPL-ACNC: 12.4 SEC — SIGNIFICANT CHANGE UP (ref 10.6–13.6)
RBC # BLD: 3.52 M/UL — LOW (ref 3.8–5.2)
RBC # FLD: 12.8 % — SIGNIFICANT CHANGE UP (ref 10.3–14.5)
SODIUM SERPL-SCNC: 134 MMOL/L — LOW (ref 135–145)
SP GR UR STRIP: 1.02
WBC # BLD: 12.19 K/UL — HIGH (ref 3.8–10.5)
WBC # FLD AUTO: 12.19 K/UL — HIGH (ref 3.8–10.5)

## 2021-11-08 PROCEDURE — 59426 ANTEPARTUM CARE ONLY: CPT

## 2021-11-08 PROCEDURE — 76818 FETAL BIOPHYS PROFILE W/NST: CPT

## 2021-11-08 PROCEDURE — G0463: CPT

## 2021-11-08 PROCEDURE — 0502F SUBSEQUENT PRENATAL CARE: CPT

## 2021-11-08 NOTE — H&P PST ADULT - NSICDXFAMILYHX_GEN_ALL_CORE_FT
FAMILY HISTORY:  Mother  Still living? Unknown  FHx: diabetes mellitus, Age at diagnosis: Age Unknown    Aunt  Still living? Unknown  FH: breast cancer, Age at diagnosis: Age Unknown    Uncle  Still living? Unknown  FH: colon cancer, Age at diagnosis: Age Unknown

## 2021-11-08 NOTE — H&P PST ADULT - NSICDXPASTMEDICALHX_GEN_ALL_CORE_FT
PAST MEDICAL HISTORY:  Gallbladder stone without cholecystitis or obstruction     Gestational diabetes     Hypothyroid     Right upper quadrant pain      PAST MEDICAL HISTORY:  Gallbladder stone without cholecystitis or obstruction     Gestational diabetes      1, currently pregnant     Hypothyroid     Right upper quadrant pain

## 2021-11-08 NOTE — H&P PST ADULT - GIT ABD PE PAL DETAILS PC
Pt rinsed back and CRRT restarted after system clotting, distal port  flushes and aspirates  with some difficulty, circuit pressures within acceptable limitis.   patient is at 39 weeks GA/distended

## 2021-11-08 NOTE — H&P PST ADULT - PROBLEM SELECTOR PLAN 1
preop assessment, medical clearance pending  laparoscopic cholecystectomy with intraoperative cholangiogram w/Dr Irene scheduled for 11/19/2021

## 2021-11-08 NOTE — H&P PST ADULT - ATTENDING PHYSICIAN: I HAVE REVIEWED THE CLINICAL DOCUMENTATION AND AGREE WITH THE ABOVE NOTE
On call for Sammy Denton pt called re his td that Sammy was suppose to send in, she sent it to his mail order pharm can you send over to christina  
Statement Selected

## 2021-11-08 NOTE — H&P PST ADULT - ASSESSMENT
30 yo F  at 39 weeks GA LMP 2021, PMH of hypothyroid, POS, gestational diabetes, patient went to [facility] ER on 10/26/2021 with c/o nausea, vomiting, gas pains, malaise and diarrhea, cholelithiasis noted on RUQ ultrasound in ER, patient treated conservatively and discharged on 10/29/2021. Today patient reports feeling well and denies fevers, chills, any pain, SOB, palpitations, dizziness, syncope, LE edema, headaches, visual disturbances, any bowel or bladder issues, vaginal bleeding or spotting. She endorses good fetal movement. Denies sick contacts. Admits some fatigue. Scheduled for induction of labor on 2021. Preop assessment prior to laparoscopic cholecystectomy with intraoperative cholangiogram w/Dr Irene scheduled for 2021    Pt was educated on preop preparation with written and verbal instructions. Pt was informed to obtain clearance >3 days before surgery. Pt will review medications with PCP. Pt was educated on NSAIDs, multivitamins and herbals that increase the risk of bleeding and need to be stopped 7 days before procedure. Pt was educated on covid testing and covid prevention, i.e. social distancing, handwashing, mask wearing. Pt verbalized understanding of the above.     OPIOID RISK TOOL    EDIL EACH BOX THAT APPLIES AND ADD TOTALS AT THE END    FAMILY HISTORY OF SUBSTANCE ABUSE                 FEMALE         MALE                                                Alcohol                             [  ]1 pt          [  ]3pts                                               Illegal Durgs                     [  ]2 pts        [  ]3pts                                               Rx Drugs                           [  ]4 pts        [  ]4 pts    PERSONAL HISTORY OF SUBSTANCE ABUSE                                                                                          Alcohol                             [  ]3 pts       [  ]3 pts                                               Illegal Drugs                     [  ]4 pts        [  ]4 pts                                               Rx Drugs                           [  ]5 pts        [  ]5 pts    AGE BETWEEN 16-45 YEARS                                      [x  ]1 pt         [  ]1 pt    HISTORY OF PREADOLESCENT   SEXUAL ABUSE                                                             [  ]3 pts        [  ]0pts    PSYCHOLOGICAL DISEASE                     ADD, OCD, Bipolar, Schizophrenia        [  ]2 pts         [  ]2 pts                      Depression                                               [  ]1 pt           [  ]1 pt           SCORING TOTAL   (add numbers and type here)              ( 1 )                                     A score of 3 or lower indicated LOW risk for future opioid abuse  A score of 4 to 7 indicated moderate risk for future opioid abuse  A score of 8 or higher indicates a high risk for opioid abuse    CAPRINI VTE 2.0 SCORE [CLOT updated 2019]    AGE RELATED RISK FACTORS                                                       MOBILITY RELATED FACTORS  [ ] Age 41-60 years                                            (1 Point)                    [ ] Bed rest                                                        (1 Point)  [ ] Age: 61-74 years                                           (2 Points)                  [ ] Plaster cast                                                   (2 Points)  [ ] Age= 75 years                                              (3 Points)                    [ ] Bed bound for more than 72 hours                 (2 Points)    DISEASE RELATED RISK FACTORS                                               GENDER SPECIFIC FACTORS  [ ] Edema in the lower extremities                       (1 Point)              [ ] Pregnancy                                                     (1 Point)  [ ] Varicose veins                                               (1 Point)                     [ x] Post-partum < 6 weeks                                   (1 Point)             [x] BMI > 25 Kg/m2                                            (1 Point)                     [ ] Hormonal therapy  or oral contraception          (1 Point)                 [ ] Sepsis (in the previous month)                        (1 Point)               [ ] History of pregnancy complications                 (1 point)  [ ] Pneumonia or serious lung disease                                               [ ] Unexplained or recurrent                     (1 Point)           (in the previous month)                               (1 Point)  [ ] Abnormal pulmonary function test                     (1 Point)                 SURGERY RELATED RISK FACTORS  [ ] Acute myocardial infarction                              (1 Point)               [ ]  Section                                             (1 Point)  [ ] Congestive heart failure (in the previous month)  (1 Point)      [ ] Minor surgery                                                  (1 Point)   [ ] Inflammatory bowel disease                             (1 Point)               [ ] Arthroscopic surgery                                        (2 Points)  [ ] Central venous access                                      (2 Points)                [x ] General surgery lasting more than 45 minutes (2 points)  [ ] Malignancy- Present or previous                   (2 Points)                [ ] Elective arthroplasty                                         (5 points)    [ ] Stroke (in the previous month)                          (5 Points)                                                                                                                                                           HEMATOLOGY RELATED FACTORS                                                 TRAUMA RELATED RISK FACTORS  [ ] Prior episodes of VTE                                     (3 Points)                [ ] Fracture of the hip, pelvis, or leg                       (5 Points)  [ ] Positive family history for VTE                         (3 Points)             [ ] Acute spinal cord injury (in the previous month)  (5 Points)  [ ] Prothrombin 96837 A                                     (3 Points)               [ ] Paralysis  (less than 1 month)                             (5 Points)  [ ] Factor V Leiden                                             (3 Points)                  [ ] Multiple Trauma within 1 month                        (5 Points)  [ ] Lupus anticoagulants                                     (3 Points)                                                           [ ] Anticardiolipin antibodies                               (3 Points)                                                       [ ] High homocysteine in the blood                      (3 Points)                                             [ ] Other congenital or acquired thrombophilia      (3 Points)                                                [ ] Heparin induced thrombocytopenia                  (3 Points)                                     Total Score [    4      ]

## 2021-11-08 NOTE — H&P PST ADULT - HISTORY OF PRESENT ILLNESS
32 yo F G1 39 weeks GA PMH of DM2, hypothyroid presents with c/o gallbladder attack Preop assessment prior to laparoscopic cholecystectomy w/Dr Maria Victoria malhotra for 11/19/2021 32 yo F  at 39 weeks GA LMP 2021, PMH of hypothyroid, POS, gestational diabetes, patient went to [facility] ER on 10/26/2021 with c/o nausea, vomiting, gas pains, malaise and diarrhea, cholelithiasis noted on RUQ ultrasound in ER, patient treated conservatively and discharged on 10/29/2021. Today patient reports feeling well and denies fevers, chills, any pain, SOB, palpitations, dizziness, syncope, LE edema, headaches, visual disturbances, any bowel or bladder issues, vaginal bleeding or spotting. She endorses good fetal movement. Denies sick contacts. Admits some fatique. Scheduled for induction of labor on 2021. Preop assessment prior to laparoscopic cholecystectomy with intraoperative cholangiogram w/Dr Irene scheduled for 2021    Completed Covid 19 vaccination: Pfizer x2 (10/13/2021)

## 2021-11-08 NOTE — H&P PST ADULT - ATTENDING COMMENTS
2021 -  after 11/10 induction of labor  2021 - discharged home    The risks (bleeding, infection, bile duct injury), benefits and alternatives of laparoscopic (possible open) cholecystectomy with intraoperative cholangiogram were discussed with the patient and her partner. Written informed consent was obtained for surgery.

## 2021-11-10 ENCOUNTER — APPOINTMENT (OUTPATIENT)
Dept: OBGYN | Facility: HOSPITAL | Age: 31
End: 2021-11-10

## 2021-11-10 ENCOUNTER — INPATIENT (INPATIENT)
Facility: HOSPITAL | Age: 31
LOS: 2 days | Discharge: HOME | End: 2021-11-13
Attending: OBSTETRICS & GYNECOLOGY | Admitting: OBSTETRICS & GYNECOLOGY
Payer: MEDICAID

## 2021-11-10 VITALS — HEART RATE: 85 BPM | SYSTOLIC BLOOD PRESSURE: 125 MMHG | DIASTOLIC BLOOD PRESSURE: 76 MMHG

## 2021-11-10 DIAGNOSIS — Z98.890 OTHER SPECIFIED POSTPROCEDURAL STATES: Chronic | ICD-10-CM

## 2021-11-10 DIAGNOSIS — K08.409 PARTIAL LOSS OF TEETH, UNSPECIFIED CAUSE, UNSPECIFIED CLASS: Chronic | ICD-10-CM

## 2021-11-10 DIAGNOSIS — O26.893 OTHER SPECIFIED PREGNANCY RELATED CONDITIONS, THIRD TRIMESTER: ICD-10-CM

## 2021-11-10 PROBLEM — R10.11 RIGHT UPPER QUADRANT PAIN: Chronic | Status: ACTIVE | Noted: 2021-11-08

## 2021-11-10 PROBLEM — K80.20 CALCULUS OF GALLBLADDER WITHOUT CHOLECYSTITIS WITHOUT OBSTRUCTION: Chronic | Status: ACTIVE | Noted: 2021-11-08

## 2021-11-10 PROBLEM — Z34.00 ENCOUNTER FOR SUPERVISION OF NORMAL FIRST PREGNANCY, UNSPECIFIED TRIMESTER: Chronic | Status: ACTIVE | Noted: 2021-11-08

## 2021-11-10 PROBLEM — O24.419 GESTATIONAL DIABETES MELLITUS IN PREGNANCY, UNSPECIFIED CONTROL: Chronic | Status: ACTIVE | Noted: 2021-11-08

## 2021-11-10 PROBLEM — E03.9 HYPOTHYROIDISM, UNSPECIFIED: Chronic | Status: ACTIVE | Noted: 2021-11-08

## 2021-11-10 LAB
ALBUMIN SERPL ELPH-MCNC: 3.4 G/DL — SIGNIFICANT CHANGE UP (ref 3.3–5.2)
ALP SERPL-CCNC: 129 U/L — HIGH (ref 40–120)
ALT FLD-CCNC: 10 U/L — SIGNIFICANT CHANGE UP
AMYLASE P1 CFR SERPL: 52 U/L — SIGNIFICANT CHANGE UP (ref 36–128)
ANION GAP SERPL CALC-SCNC: 13 MMOL/L — SIGNIFICANT CHANGE UP (ref 5–17)
AST SERPL-CCNC: 12 U/L — SIGNIFICANT CHANGE UP
BASOPHILS # BLD AUTO: 0.03 K/UL — SIGNIFICANT CHANGE UP (ref 0–0.2)
BASOPHILS NFR BLD AUTO: 0.3 % — SIGNIFICANT CHANGE UP (ref 0–2)
BILIRUB SERPL-MCNC: <0.2 MG/DL — LOW (ref 0.4–2)
BLD GP AB SCN SERPL QL: SIGNIFICANT CHANGE UP
BUN SERPL-MCNC: 8.4 MG/DL — SIGNIFICANT CHANGE UP (ref 8–20)
CALCIUM SERPL-MCNC: 8.8 MG/DL — SIGNIFICANT CHANGE UP (ref 8.6–10.2)
CHLORIDE SERPL-SCNC: 103 MMOL/L — SIGNIFICANT CHANGE UP (ref 98–107)
CO2 SERPL-SCNC: 17 MMOL/L — LOW (ref 22–29)
COVID-19 SPIKE DOMAIN AB INTERP: POSITIVE
COVID-19 SPIKE DOMAIN ANTIBODY RESULT: >250 U/ML — HIGH
CREAT SERPL-MCNC: 0.47 MG/DL — LOW (ref 0.5–1.3)
EOSINOPHIL # BLD AUTO: 0.16 K/UL — SIGNIFICANT CHANGE UP (ref 0–0.5)
EOSINOPHIL NFR BLD AUTO: 1.3 % — SIGNIFICANT CHANGE UP (ref 0–6)
GLUCOSE BLDC GLUCOMTR-MCNC: 121 MG/DL — HIGH (ref 70–99)
GLUCOSE BLDC GLUCOMTR-MCNC: 129 MG/DL — HIGH (ref 70–99)
GLUCOSE BLDC GLUCOMTR-MCNC: 91 MG/DL — SIGNIFICANT CHANGE UP (ref 70–99)
GLUCOSE BLDC GLUCOMTR-MCNC: 93 MG/DL — SIGNIFICANT CHANGE UP (ref 70–99)
GLUCOSE BLDC GLUCOMTR-MCNC: 95 MG/DL — SIGNIFICANT CHANGE UP (ref 70–99)
GLUCOSE BLDC GLUCOMTR-MCNC: 98 MG/DL — SIGNIFICANT CHANGE UP (ref 70–99)
GLUCOSE SERPL-MCNC: 152 MG/DL — HIGH (ref 70–99)
HCT VFR BLD CALC: 32.4 % — LOW (ref 34.5–45)
HGB BLD-MCNC: 10.9 G/DL — LOW (ref 11.5–15.5)
IMM GRANULOCYTES NFR BLD AUTO: 0.6 % — SIGNIFICANT CHANGE UP (ref 0–1.5)
LIDOCAIN IGE QN: 25 U/L — SIGNIFICANT CHANGE UP (ref 22–51)
LYMPHOCYTES # BLD AUTO: 19.1 % — SIGNIFICANT CHANGE UP (ref 13–44)
LYMPHOCYTES # BLD AUTO: 2.26 K/UL — SIGNIFICANT CHANGE UP (ref 1–3.3)
MCHC RBC-ENTMCNC: 30.5 PG — SIGNIFICANT CHANGE UP (ref 27–34)
MCHC RBC-ENTMCNC: 33.6 GM/DL — SIGNIFICANT CHANGE UP (ref 32–36)
MCV RBC AUTO: 90.8 FL — SIGNIFICANT CHANGE UP (ref 80–100)
MONOCYTES # BLD AUTO: 0.74 K/UL — SIGNIFICANT CHANGE UP (ref 0–0.9)
MONOCYTES NFR BLD AUTO: 6.2 % — SIGNIFICANT CHANGE UP (ref 2–14)
NEUTROPHILS # BLD AUTO: 8.6 K/UL — HIGH (ref 1.8–7.4)
NEUTROPHILS NFR BLD AUTO: 72.5 % — SIGNIFICANT CHANGE UP (ref 43–77)
PLATELET # BLD AUTO: 273 K/UL — SIGNIFICANT CHANGE UP (ref 150–400)
POTASSIUM SERPL-MCNC: 3.6 MMOL/L — SIGNIFICANT CHANGE UP (ref 3.5–5.3)
POTASSIUM SERPL-SCNC: 3.6 MMOL/L — SIGNIFICANT CHANGE UP (ref 3.5–5.3)
PROT SERPL-MCNC: 6.6 G/DL — SIGNIFICANT CHANGE UP (ref 6.6–8.7)
RBC # BLD: 3.57 M/UL — LOW (ref 3.8–5.2)
RBC # FLD: 13 % — SIGNIFICANT CHANGE UP (ref 10.3–14.5)
SARS-COV-2 IGG+IGM SERPL QL IA: >250 U/ML — HIGH
SARS-COV-2 IGG+IGM SERPL QL IA: POSITIVE
SODIUM SERPL-SCNC: 133 MMOL/L — LOW (ref 135–145)
T PALLIDUM AB TITR SER: NEGATIVE — SIGNIFICANT CHANGE UP
WBC # BLD: 11.86 K/UL — HIGH (ref 3.8–10.5)
WBC # FLD AUTO: 11.86 K/UL — HIGH (ref 3.8–10.5)

## 2021-11-10 RX ORDER — INSULIN LISPRO 100/ML
VIAL (ML) SUBCUTANEOUS
Refills: 0 | Status: DISCONTINUED | OUTPATIENT
Start: 2021-11-10 | End: 2021-11-12

## 2021-11-10 RX ORDER — AZITHROMYCIN 500 MG/1
500 TABLET, FILM COATED ORAL ONCE
Refills: 0 | Status: DISCONTINUED | OUTPATIENT
Start: 2021-11-10 | End: 2021-11-11

## 2021-11-10 RX ORDER — SODIUM CHLORIDE 9 MG/ML
1000 INJECTION, SOLUTION INTRAVENOUS
Refills: 0 | Status: DISCONTINUED | OUTPATIENT
Start: 2021-11-10 | End: 2021-11-13

## 2021-11-10 RX ORDER — METOCLOPRAMIDE HCL 10 MG
10 TABLET ORAL ONCE
Refills: 0 | Status: DISCONTINUED | OUTPATIENT
Start: 2021-11-10 | End: 2021-11-11

## 2021-11-10 RX ORDER — DEXTROSE 50 % IN WATER 50 %
25 SYRINGE (ML) INTRAVENOUS ONCE
Refills: 0 | Status: DISCONTINUED | OUTPATIENT
Start: 2021-11-10 | End: 2021-11-13

## 2021-11-10 RX ORDER — CITRIC ACID/SODIUM CITRATE 300-500 MG
30 SOLUTION, ORAL ORAL ONCE
Refills: 0 | Status: DISCONTINUED | OUTPATIENT
Start: 2021-11-10 | End: 2021-11-11

## 2021-11-10 RX ORDER — SODIUM CHLORIDE 9 MG/ML
1000 INJECTION, SOLUTION INTRAVENOUS ONCE
Refills: 0 | Status: COMPLETED | OUTPATIENT
Start: 2021-11-10 | End: 2021-11-10

## 2021-11-10 RX ORDER — CEFAZOLIN SODIUM 1 G
3000 VIAL (EA) INJECTION ONCE
Refills: 0 | Status: DISCONTINUED | OUTPATIENT
Start: 2021-11-10 | End: 2021-11-11

## 2021-11-10 RX ORDER — SODIUM CHLORIDE 9 MG/ML
1000 INJECTION, SOLUTION INTRAVENOUS
Refills: 0 | Status: DISCONTINUED | OUTPATIENT
Start: 2021-11-10 | End: 2021-11-11

## 2021-11-10 RX ORDER — OXYTOCIN 10 UNIT/ML
333.33 VIAL (ML) INJECTION
Qty: 20 | Refills: 0 | Status: COMPLETED | OUTPATIENT
Start: 2021-11-10 | End: 2021-11-10

## 2021-11-10 RX ORDER — OXYTOCIN 10 UNIT/ML
VIAL (ML) INJECTION
Qty: 30 | Refills: 0 | Status: DISCONTINUED | OUTPATIENT
Start: 2021-11-10 | End: 2021-11-13

## 2021-11-10 RX ORDER — FAMOTIDINE 10 MG/ML
20 INJECTION INTRAVENOUS ONCE
Refills: 0 | Status: COMPLETED | OUTPATIENT
Start: 2021-11-10 | End: 2021-11-10

## 2021-11-10 RX ORDER — DEXTROSE 50 % IN WATER 50 %
15 SYRINGE (ML) INTRAVENOUS ONCE
Refills: 0 | Status: DISCONTINUED | OUTPATIENT
Start: 2021-11-10 | End: 2021-11-13

## 2021-11-10 RX ORDER — GLUCAGON INJECTION, SOLUTION 0.5 MG/.1ML
1 INJECTION, SOLUTION SUBCUTANEOUS ONCE
Refills: 0 | Status: DISCONTINUED | OUTPATIENT
Start: 2021-11-10 | End: 2021-11-13

## 2021-11-10 RX ORDER — LEVOTHYROXINE SODIUM 125 MCG
88 TABLET ORAL DAILY
Refills: 0 | Status: DISCONTINUED | OUTPATIENT
Start: 2021-11-10 | End: 2021-11-13

## 2021-11-10 RX ORDER — OXYTOCIN 10 UNIT/ML
VIAL (ML) INJECTION
Qty: 30 | Refills: 0 | Status: DISCONTINUED | OUTPATIENT
Start: 2021-11-10 | End: 2021-11-10

## 2021-11-10 RX ORDER — FAMOTIDINE 10 MG/ML
20 INJECTION INTRAVENOUS ONCE
Refills: 0 | Status: DISCONTINUED | OUTPATIENT
Start: 2021-11-10 | End: 2021-11-11

## 2021-11-10 RX ORDER — DEXTROSE 50 % IN WATER 50 %
12.5 SYRINGE (ML) INTRAVENOUS ONCE
Refills: 0 | Status: DISCONTINUED | OUTPATIENT
Start: 2021-11-10 | End: 2021-11-13

## 2021-11-10 RX ADMIN — SODIUM CHLORIDE 125 MILLILITER(S): 9 INJECTION, SOLUTION INTRAVENOUS at 14:00

## 2021-11-10 RX ADMIN — SODIUM CHLORIDE 125 MILLILITER(S): 9 INJECTION, SOLUTION INTRAVENOUS at 13:55

## 2021-11-10 RX ADMIN — Medication 88 MICROGRAM(S): at 06:19

## 2021-11-10 RX ADMIN — Medication 2 MILLIUNIT(S)/MIN: at 14:53

## 2021-11-10 RX ADMIN — FAMOTIDINE 20 MILLIGRAM(S): 10 INJECTION INTRAVENOUS at 15:33

## 2021-11-10 RX ADMIN — SODIUM CHLORIDE 1000 MILLILITER(S): 9 INJECTION, SOLUTION INTRAVENOUS at 12:30

## 2021-11-10 NOTE — OB PROVIDER H&P - HISTORY OF PRESENT ILLNESS
31y  at 39w1d GA by LMP who presents to L&D for induction of labor. Patient denies vaginal bleeding, contractions and leakage of fluid. She endorses good fetal movement. Denies fevers, chills, nausea, vomiting, chest pain, SOB, dizziness and headache. No other complaints at this time.   RICHARD: _  LMP: _  Prenatal course is significant for:  Prenatal course uncomplicated.      POB:  PGYN: -fibroids, -ovarian cysts, denies STD hx, denies abnormal PAPs   PMH: Denies  PSH: Denies  SH: Denies EtOH, tobacco and illicit drug use during this pregnancy; feels safe at home   Meds: PNVs  Allergies: NKDA    BMI:  Sono:  EFW:     T(C): --  HR: 85 (11-10-21 @ 00:52) (85 - 85)  BP: 125/76 (11-10-21 @ 00:52) (125/76 - 125/76)  RR: --  SpO2: --    Gen: NAD, well-appearing, AAOx3   Abd: Soft, gravid  Ext: non-tender, non-edematous  SSE:   SVE:    Bedside sono:  FHT:  Ivor:       A/P:   -Admit to L&D  -Consent  -Admission labs  -NPO, except ice chips   -IV fluids  -Labor: Intact/*ROM. Latent/Active labor. Isael *.   -Fetus: Cat I tracing. Continuous toco and fetal monitoring.   -GBS: Negative, no GBS ppx required   -Analgesia:     Discussed with  _ 31y  at 39w1d GA by LMP who presents to L&D for induction of labor. Patient denies vaginal bleeding, contractions and leakage of fluid. She endorses good fetal movement. Denies fevers, chills, nausea, vomiting, chest pain, SOB, dizziness and headache. No other complaints at this time.   RICHARD: 21  LMP: 21  Prenatal course is significant for GDMA2 on humilin 50u.     POB: denies  PGYN: PCOS, -fibroids, denies STD hx, denies abnormal PAPs   PMH: Hypothyroidism, GERD, gallstone pancreatitis  PSH: [place] teeth removed  SH: Denies EtOH, tobacco and illicit drug use during this pregnancy   Meds: PNVs, levothyroxine 88mcg, humilin, ASA 81mg, vitamin D, pepcid  Allergies: Chloraseptic spray (anaphylaxis) 31y  at 39w1d GA by LMP who presents to L&D for induction of labor. Patient denies vaginal bleeding, contractions and leakage of fluid. She endorses good fetal movement. Denies fevers, chills, nausea, vomiting, chest pain, SOB, dizziness and headache. No other complaints at this time.     RICHARD: 21  LMP: 21    Prenatal course is significant for:  1. GDMA2 on Humilin 50U  2. Hypothyroidism on synthroid 88mcg  3. Acute pancreatitis during antepartum course     POB: denies  PGYN: PCOS, -fibroids, denies STD hx, denies abnormal PAPs   PMH: Hypothyroidism, GERD, pancreatitis  PSH: [place] teeth removed  SH: Denies EtOH, tobacco and illicit drug use during this pregnancy   Meds: PNVs, levothyroxine 88mcg, humilin, ASA 81mg, vitamin D, pepcid  Allergies: Chloraseptic spray (anaphylaxis)

## 2021-11-10 NOTE — CHART NOTE - NSCHARTNOTEFT_GEN_A_CORE
Pt seen at bedside secondary to CATII fetal cardiotocography.  Maternal rescue efforts of fluid bolus, amnioinfusion, O2 and maternal positioning in place.  Risks, benefits, and alternatives of continuation of augmentation of labor vs primary  section discussed at length, including risk of injury to adjacent organs, such as the bladder, bowel, and bilateral ureters, risk of infection, and risk of hemorrhage, which may lead to subsequent intervention and possible blood transfusion.  She has no Restorationism or personal objection to blood transfusion, if necessary.      She was given the opportunity to ask questions and all were addressed and she wishes to continue with labor at this time, but understands it could lead to an emergency procedure.    Marco A Byrne, DO

## 2021-11-10 NOTE — OB PROVIDER H&P - NSHPPHYSICALEXAM_GEN_ALL_CORE
HR: 85 (11-10-21 @ 00:52) (85 - 85)  BP: 125/76 (11-10-21 @ 00:52) (125/76 - 125/76)    Gen: NAD, well-appearing, AAOx3   Abd: Soft, gravid  Ext: non-tender, non-edematous  SVE: 0/0/-3  Bedside sono: VTX/Ant  FHT: Baseline 135, moderate variability, +accels, no decels  [place]: no CTX HR: 85 (11-10-21 @ 00:52) (85 - 85)  BP: 125/76 (11-10-21 @ 00:52) (125/76 - 125/76)    Gen: NAD, well-appearing, AAOx3   Abd: Soft, gravid  Ext: non-tender, non-edematous    SVE: 0/0/-3  Bedside sono: VTX, Anterior placenta    FHT: Baseline 135, moderate variability, +accels, no decels  [place]: no CTX

## 2021-11-10 NOTE — OB PROVIDER H&P - ASSESSMENT
A/P:   -Admit to L&D  -Consent  -Admission labs  -NPO, except ice chips   -IV fluids  -Labor: Intact/*ROM. Latent/Active labor. Isael *.   -Fetus: Cat I tracing. Continuous toco and fetal monitoring.   -GBS: Negative, no GBS ppx required   -Analgesia:     Discussed with  _ A/P: 31y  at 39w1d GA by LMP who presents to L&D for induction of labor.  -Admit to L&D  -Consent  -Admission labs  -NPO, except ice chips   -IV fluids  -Labor: Intact   -Fetus: Cat I tracing. Continuous toco and fetal monitoring.   -GBS: Negative, no GBS ppx required   -Analgesia: Desires epidural    Discussed with  _ A/P: 31y  at 39w1d GA by LMP who presents to L&D for induction of labor secondary to GDMA2.  -Admit to L&D  -Consent  -Admission labs  -IV fluids  -Labor: Intact   -Fetus: Cat I tracing. Continuous toco and fetal monitoring.   -GBS: Negative, no GBS ppx required   -Analgesia: Desires epidural    Discussed with Dr. Dubois

## 2021-11-10 NOTE — OB RN PATIENT PROFILE - NSICDXPASTMEDICALHX_GEN_ALL_CORE_FT
PAST MEDICAL HISTORY:  Gallbladder stone without cholecystitis or obstruction     Gestational diabetes      1, currently pregnant     Hypothyroid     Right upper quadrant pain

## 2021-11-11 ENCOUNTER — RESULT REVIEW (OUTPATIENT)
Age: 31
End: 2021-11-11

## 2021-11-11 ENCOUNTER — APPOINTMENT (OUTPATIENT)
Dept: ANTEPARTUM | Facility: CLINIC | Age: 31
End: 2021-11-11

## 2021-11-11 LAB
COVID-19 NUCLEOCAPSID GAM AB INTERP: POSITIVE
COVID-19 NUCLEOCAPSID TOTAL GAM ANTIBODY RESULT: 277 INDEX — HIGH
GLUCOSE BLDC GLUCOMTR-MCNC: 105 MG/DL — HIGH (ref 70–99)
GLUCOSE BLDC GLUCOMTR-MCNC: 112 MG/DL — HIGH (ref 70–99)
GLUCOSE BLDC GLUCOMTR-MCNC: 116 MG/DL — HIGH (ref 70–99)
GLUCOSE BLDC GLUCOMTR-MCNC: 121 MG/DL — HIGH (ref 70–99)
GLUCOSE BLDC GLUCOMTR-MCNC: 128 MG/DL — HIGH (ref 70–99)
GLUCOSE BLDC GLUCOMTR-MCNC: 130 MG/DL — HIGH (ref 70–99)
GLUCOSE BLDC GLUCOMTR-MCNC: 143 MG/DL — HIGH (ref 70–99)
HCT VFR BLD CALC: 20.3 % — CRITICAL LOW (ref 34.5–45)
HGB BLD-MCNC: 6.9 G/DL — CRITICAL LOW (ref 11.5–15.5)
SARS-COV-2 IGG+IGM SERPL QL IA: 277 INDEX — HIGH
SARS-COV-2 IGG+IGM SERPL QL IA: POSITIVE

## 2021-11-11 PROCEDURE — 88307 TISSUE EXAM BY PATHOLOGIST: CPT | Mod: 26

## 2021-11-11 PROCEDURE — 59410 OBSTETRICAL CARE: CPT | Mod: U9

## 2021-11-11 RX ORDER — OXYCODONE HYDROCHLORIDE 5 MG/1
5 TABLET ORAL
Refills: 0 | Status: DISCONTINUED | OUTPATIENT
Start: 2021-11-11 | End: 2021-11-13

## 2021-11-11 RX ORDER — DIPHENHYDRAMINE HCL 50 MG
25 CAPSULE ORAL ONCE
Refills: 0 | Status: COMPLETED | OUTPATIENT
Start: 2021-11-11 | End: 2021-11-11

## 2021-11-11 RX ORDER — OXYCODONE HYDROCHLORIDE 5 MG/1
5 TABLET ORAL ONCE
Refills: 0 | Status: DISCONTINUED | OUTPATIENT
Start: 2021-11-11 | End: 2021-11-13

## 2021-11-11 RX ORDER — KETOROLAC TROMETHAMINE 30 MG/ML
30 SYRINGE (ML) INJECTION ONCE
Refills: 0 | Status: DISCONTINUED | OUTPATIENT
Start: 2021-11-11 | End: 2021-11-11

## 2021-11-11 RX ORDER — ACETAMINOPHEN 500 MG
975 TABLET ORAL
Refills: 0 | Status: DISCONTINUED | OUTPATIENT
Start: 2021-11-11 | End: 2021-11-13

## 2021-11-11 RX ORDER — IBUPROFEN 200 MG
600 TABLET ORAL EVERY 6 HOURS
Refills: 0 | Status: DISCONTINUED | OUTPATIENT
Start: 2021-11-11 | End: 2021-11-13

## 2021-11-11 RX ORDER — IBUPROFEN 200 MG
600 TABLET ORAL EVERY 6 HOURS
Refills: 0 | Status: COMPLETED | OUTPATIENT
Start: 2021-11-11 | End: 2022-10-10

## 2021-11-11 RX ORDER — SODIUM CHLORIDE 9 MG/ML
1000 INJECTION, SOLUTION INTRAVENOUS
Refills: 0 | Status: DISCONTINUED | OUTPATIENT
Start: 2021-11-11 | End: 2021-11-13

## 2021-11-11 RX ADMIN — Medication 2 MILLIUNIT(S)/MIN: at 00:00

## 2021-11-11 RX ADMIN — Medication 30 MILLIGRAM(S): at 08:46

## 2021-11-11 RX ADMIN — Medication 30 MILLIGRAM(S): at 09:01

## 2021-11-11 RX ADMIN — Medication 600 MILLIGRAM(S): at 13:53

## 2021-11-11 RX ADMIN — Medication 975 MILLIGRAM(S): at 21:25

## 2021-11-11 RX ADMIN — Medication 88 MICROGRAM(S): at 05:56

## 2021-11-11 RX ADMIN — SODIUM CHLORIDE 125 MILLILITER(S): 9 INJECTION, SOLUTION INTRAVENOUS at 05:49

## 2021-11-11 RX ADMIN — Medication 975 MILLIGRAM(S): at 16:44

## 2021-11-11 RX ADMIN — Medication 975 MILLIGRAM(S): at 22:00

## 2021-11-11 RX ADMIN — Medication 25 MILLIGRAM(S): at 23:14

## 2021-11-11 RX ADMIN — SODIUM CHLORIDE 250 MILLILITER(S): 9 INJECTION, SOLUTION INTRAVENOUS at 04:15

## 2021-11-11 RX ADMIN — Medication 1000 MILLIUNIT(S)/MIN: at 08:02

## 2021-11-11 RX ADMIN — Medication 975 MILLIGRAM(S): at 17:44

## 2021-11-11 RX ADMIN — Medication 600 MILLIGRAM(S): at 18:06

## 2021-11-11 RX ADMIN — Medication 0.2 MILLIGRAM(S): at 08:04

## 2021-11-11 RX ADMIN — Medication 600 MILLIGRAM(S): at 18:34

## 2021-11-11 RX ADMIN — Medication 600 MILLIGRAM(S): at 12:53

## 2021-11-11 NOTE — OB PROVIDER DELIVERY SUMMARY - NSPROVIDERDELIVERYNOTE_OBGYN_ALL_OB_FT
Patient felt rectal pressure and was found to be fully dilated, 0 station. Mars catheter removed and patient positioned for pushing.  She pushed effectively for 30 minutes.  Patient prepped and draped for delivery.  In conjunction with maternal effort, she delivered a viable male infant  Head delivered HANG   No nuchal cord. Bulb suctioned at perineum.  shoulders and body then delivered without difficulty.  Delayed cord clamping 30 seconds, cord clamped x2 and cut. Cord blood also collected for blood type.  Placenta delivered spontaneously and intact  No gross pathology, 3 vessel cord. Excellent hemostasis was achieved.  Perineum and vagina were inspected and a 2nd degree bilateral perineal laceration was noted and repaired with 2-0 vicryl or 3-0 vicryl suture. Excellent hemostasis obtained, EBL ____ cc, no complications.    [place]: male, Apgars 9/9. Weight to be done at 1hr postpartum approximately. Patient felt rectal pressure and was found to be fully dilated, 0 station. Mars catheter removed and patient positioned for pushing.  She pushed effectively for 30 minutes.  Patient prepped and draped for delivery.  In conjunction with maternal effort, she delivered a viable male infant  Head delivered HANG   No nuchal cord. Bulb suctioned at perineum.  shoulders and body then delivered without difficulty.  Delayed cord clamping 30 seconds, cord clamped x2 and cut. Cord blood also collected for blood type.  Placenta delivered spontaneously and intact  No gross pathology, 3 vessel cord. Uterine atony noted; bimanual exam performed in addition to methergine and rectal cytotec. Excellent hemostasis was achieved.  Perineum and vagina were inspected and a 2nd degree bilateral perineal laceration was noted and repaired with 2-0 vicryl or 3-0 vicryl suture. Excellent hemostasis obtained, EBL 621cc, no complications.    : male, Apgars 9/9. Weight to be done at 1hr postpartum approximately.

## 2021-11-11 NOTE — OB PROVIDER LABOR PROGRESS NOTE - ASSESSMENT
Pt admitted for IOL  -VSS  -FHR tracing Cat. 1  -Continue pitocin  -Epidural in place  
pt laboring down  continuous monitoring  cat 1 tracing  amnioinfusion in place
AROM clear @time of exam  VSS  cat 1 FHT  pit on 2. increase as tolerated  continuous fetal and toco monitoring     discussed with Dr. Schulte
Patient still against pCS  Will restart pitocin    d/w Dr. Byrne
IUPC placed  Amnioinfusion started  Continue current management    d/w Dr. Schulte
pt comfortable, epidural in place  s/p Cook balloon   plan to AROM in an hour    discussed with Dr. Schulte  
- Cook Balloon placed, 60U/60V, cervical exam unchanged   - continue Cytotec PO series   - re-eval when clinically indicated for balloon expulsion cervical dilation, or effacement status     d/w Dr. Schulte

## 2021-11-11 NOTE — OB PROVIDER DELIVERY SUMMARY - NSSELHIDDEN_OBGYN_ALL_OB_FT
[NS_DeliveryAttending1_OBGYN_ALL_OB_FT:[id number]],[NS_DeliveryAssist1_OBGYN_ALL_OB_FT:[id number]=]

## 2021-11-11 NOTE — OB RN DELIVERY SUMMARY - NS_SEPSISRSKCALC_OBGYN_ALL_OB_FT
EOS calculated successfully. EOS Risk Factor: 0.5/1000 live births ([place] national incidence); GA=39w2d; Temp=98.24; ROM=15; GBS='Negative'; Antibiotics='No antibiotics or any antibiotics < 2 hrs prior to birth'

## 2021-11-11 NOTE — DISCHARGE NOTE NEWBORN - PATIENT PORTAL LINK FT
You can access the FollowMyHealth Patient Portal offered by [facility] by registering at the following website: [email or web address]. By joining [employer] [facility]’s FollowMyHealth portal, you will also be able to view your health information using other applications (apps) compatible with our system.

## 2021-11-11 NOTE — OB RN DELIVERY SUMMARY - NSSELHIDDEN_OBGYN_ALL_OB_FT
[NS_DeliveryAttending1_OBGYN_ALL_OB_FT:[id number]],[NS_DeliveryAssist1_OBGYN_ALL_OB_FT:[id number]=]

## 2021-11-11 NOTE — OB PROVIDER LABOR PROGRESS NOTE - NS_OBIHIFHRDETAILS_OBGYN_ALL_OB_FT
FHT: baseline 135, mod variability, +accels, -decels
Cat 1
recurrent variables
Cat I, baseline 130, moderate variability, +accels, no decels
FHT: baseline 130, mod variability, +accels, -decels
FHT: baseline 135, mod variability, +accels, -decels

## 2021-11-11 NOTE — OB PROVIDER LABOR PROGRESS NOTE - NS_SUBJECTIVE/OBJECTIVE_OBGYN_ALL_OB_FT
pt comfortable w epi in place    Vital Signs Last 24 Hrs  T(C): 36.6 (10 Nov 2021 16:11), Max: 36.8 (10 Nov 2021 04:24)  T(F): 97.88 (10 Nov 2021 16:11), Max: 98.24 (10 Nov 2021 04:24)  HR: 68 (10 Nov 2021 16:41) (62 - 89)  BP: 108/52 (10 Nov 2021 16:41) (100/56 - 135/-)  RR: 16 (10 Nov 2021 16:11) (16 - 18)  SpO2: 99% (10 Nov 2021 13:53) (99% - 99%)
Pt c/o increased pressure
30 y/o  at 39w1d admitted for IOL 2/2 GDMA2.   Patient evaluated at bedside, reporting minimal pain/cramping.
pt comfortable. no complaints    Vital Signs Last 24 Hrs  T(C): 36.5 (10 Nov 2021 08:23), Max: 36.8 (10 Nov 2021 04:24)  T(F): 97.7 (10 Nov 2021 08:23), Max: 98.24 (10 Nov 2021 04:24)  HR: 68 (10 Nov 2021 15:26) (68 - 89)  BP: 113/56 (10 Nov 2021 15:26) (100/56 - 135/-)  RR: 18 (10 Nov 2021 08:23) (18 - 18)  SpO2: 99% (10 Nov 2021 13:53) (99% - 99%)
pt uncomfortable. epi in place, pt states she's feeling her ctx    Vital Signs Last 24 Hrs  T(C): 36.5 (11 Nov 2021 05:58), Max: 36.8 (11 Nov 2021 01:41)  T(F): 97.7 (11 Nov 2021 05:58), Max: 98.2 (11 Nov 2021 01:41)  HR: 72 (11 Nov 2021 06:47) (55 - 90)  BP: 129/71 (11 Nov 2021 06:27) (99/54 - 136/76)  RR: 16 (11 Nov 2021 05:58) (16 - 20)  SpO2: 100% (11 Nov 2021 06:32) (86% - 100%)

## 2021-11-11 NOTE — OB PROVIDER LABOR PROGRESS NOTE - NSVAGINALEXAM_OBGYN_ALL_OB_DT
10-Nov-2021 18:45
10-Nov-2021 17:01
11-Nov-2021 03:20
11-Nov-2021 06:45
10-Nov-2021 10:51
10-Nov-2021 15:33

## 2021-11-11 NOTE — CHART NOTE - NSCHARTNOTEFT_GEN_A_CORE
MD called to bedside regarding patient's recent Hgb of 6.9.    Patient is a 32yo  s/p  PPD #0 complicated by stable post-partum hermorrhage.   Patient seen and examined at bedside.   Endorses dizziness when ambulating to the bathroom. Otherwise denies headache, chest pain or shortness of breath.  Expectant lochia.  Abdomen soft, nontender, nondistended. No rebounding or guarding. Uterine fundus firm.     Vital Signs Last 24 Hrs  T(C): 36.8 (2021 15:45), Max: 36.9 (2021 08:41)  T(F): 98.2 (2021 15:45), Max: 98.4 (2021 08:41)  HR: 70 (2021 15:45) (57 - 118)  BP: 114/73 (2021 15:45) (96/61 - 142/87)  BP(mean): --  RR: 18 (2021 15:45) (16 - 18)  SpO2: 100% (2021 15:45) (50% - 100%)    Hemoglobin: 6.9: Test Repeated Specimen integrity verified.  TYPE:(C=Critical, N=Notification, A=Abnormal) C  TESTS: HGB, HCT  DATE/TIME CALLED: 2021 21:17:58 EST  CALLED TO: PONCE MORAN  READ BACK (2 Patient Identifiers)(Y/N): Y  READ BACK VALUES (Y/N): Y  CALLED BY: JS g/dL (21 @ 21:08)    A/P  Anemia due to actue blood loss and hemodilution  VSS.  Plan to transfuse 2x pRBC  Pre-treatment with diphenhydramine 25mg and acetaminophen 975mg  Patient educated about signs/symptoms of hypovolemia  Continue to monitor    d/w Dr. Finch MD called to bedside regarding patient's recent Hgb of 6.9.    Patient is a 32yo  s/p  PPD #0 complicated by stable post-partum hermorrhage.   Patient seen and examined at bedside.   Endorses dizziness when ambulating to the bathroom. Otherwise denies headache, chest pain or shortness of breath.  Expectant lochia.  AOx3. NAD.  Abdomen soft, nontender, nondistended. No rebounding or guarding. Uterine fundus firm.     Vital Signs Last 24 Hrs  T(C): 36.8 (2021 15:45), Max: 36.9 (2021 08:41)  T(F): 98.2 (2021 15:45), Max: 98.4 (2021 08:41)  HR: 70 (2021 15:45) (57 - 118)  BP: 114/73 (2021 15:45) (96/61 - 142/87)  RR: 18 (2021 15:45) (16 - 18)  SpO2: 100% (2021 15:45) (50% - 100%)    Hemoglobin: 6.9: Test Repeated Specimen integrity verified.  TYPE:(C=Critical, N=Notification, A=Abnormal) C  TESTS: HGB, HCT  DATE/TIME CALLED: 2021 21:17:58 EST  CALLED TO: PONCE MORAN  READ BACK (2 Patient Identifiers)(Y/N): Y  READ BACK VALUES (Y/N): Y  CALLED BY: JS g/dL (21 @ 21:08)    A/P  Anemia due to actue blood loss and hemodilution  VSS.  Plan to transfuse 2x pRBC  Pre-treatment with diphenhydramine 25mg and acetaminophen 975mg  Patient educated about signs/symptoms of hypovolemia  Continue to monitor    d/w Dr. Finch

## 2021-11-12 ENCOUNTER — TRANSCRIPTION ENCOUNTER (OUTPATIENT)
Age: 31
End: 2021-11-12

## 2021-11-12 DIAGNOSIS — O24.414 GESTATIONAL DIABETES MELLITUS IN PREGNANCY, INSULIN CONTROLLED: ICD-10-CM

## 2021-11-12 LAB
APPEARANCE UR: ABNORMAL
BACTERIA # UR AUTO: ABNORMAL
BILIRUB UR-MCNC: NEGATIVE — SIGNIFICANT CHANGE UP
COLOR SPEC: ABNORMAL
DIFF PNL FLD: ABNORMAL
EPI CELLS # UR: ABNORMAL
GLUCOSE UR QL: NEGATIVE — SIGNIFICANT CHANGE UP
HCT VFR BLD CALC: 30.4 % — LOW (ref 34.5–45)
HGB BLD-MCNC: 10.2 G/DL — LOW (ref 11.5–15.5)
KETONES UR-MCNC: ABNORMAL
LEUKOCYTE ESTERASE UR-ACNC: ABNORMAL
MCHC RBC-ENTMCNC: 30 PG — SIGNIFICANT CHANGE UP (ref 27–34)
MCHC RBC-ENTMCNC: 33.6 GM/DL — SIGNIFICANT CHANGE UP (ref 32–36)
MCV RBC AUTO: 89.4 FL — SIGNIFICANT CHANGE UP (ref 80–100)
NITRITE UR-MCNC: POSITIVE
PH UR: 7 — SIGNIFICANT CHANGE UP (ref 5–8)
PLATELET # BLD AUTO: 219 K/UL — SIGNIFICANT CHANGE UP (ref 150–400)
PROT UR-MCNC: 100
RBC # BLD: 3.4 M/UL — LOW (ref 3.8–5.2)
RBC # FLD: 14.4 % — SIGNIFICANT CHANGE UP (ref 10.3–14.5)
RBC CASTS # UR COMP ASSIST: >50 /HPF (ref 0–4)
SP GR SPEC: 1.01 — SIGNIFICANT CHANGE UP (ref 1.01–1.02)
UROBILINOGEN FLD QL: NEGATIVE — SIGNIFICANT CHANGE UP
WBC # BLD: 22.03 K/UL — HIGH (ref 3.8–10.5)
WBC # FLD AUTO: 22.03 K/UL — HIGH (ref 3.8–10.5)
WBC UR QL: >50

## 2021-11-12 RX ADMIN — Medication 600 MILLIGRAM(S): at 16:46

## 2021-11-12 RX ADMIN — Medication 600 MILLIGRAM(S): at 00:02

## 2021-11-12 RX ADMIN — Medication 975 MILLIGRAM(S): at 16:20

## 2021-11-12 RX ADMIN — Medication 975 MILLIGRAM(S): at 09:14

## 2021-11-12 RX ADMIN — Medication 975 MILLIGRAM(S): at 21:51

## 2021-11-12 RX ADMIN — Medication 600 MILLIGRAM(S): at 01:30

## 2021-11-12 RX ADMIN — Medication 975 MILLIGRAM(S): at 02:51

## 2021-11-12 RX ADMIN — Medication 975 MILLIGRAM(S): at 15:20

## 2021-11-12 RX ADMIN — Medication 975 MILLIGRAM(S): at 10:14

## 2021-11-12 RX ADMIN — Medication 975 MILLIGRAM(S): at 22:30

## 2021-11-12 RX ADMIN — Medication 88 MICROGRAM(S): at 06:01

## 2021-11-12 RX ADMIN — Medication 600 MILLIGRAM(S): at 17:46

## 2021-11-12 NOTE — PROGRESS NOTE ADULT - SUBJECTIVE AND OBJECTIVE BOX
JOSEP CARRANZA is a 31y  now PPD#1 s/p spontaneous vaginal delivery at 39w2d gestation complicated by hemorrhage s/p IM Methergine, rectal Cytotec and 2u PRBC. Intrapartum course complicated by GDMA2.    S:    No acute events overnight.   The patient has no complaints.  Pain controlled with current treatment regimen.   She is ambulating without difficulty and tolerating PO.   + flatus/-BM/+ voiding   She endorses appropriate lochia, which is decreasing.   She is breastfeeding without difficulty.   She denies fevers, chills, nausea and vomiting.   She denies lightheadedness, dizziness, palpitations, chest pain and SOB.     O:    T(C): 36.6 (21 @ 22:00), Max: 36.9 (21 @ 08:41)  HR: 84 (21 @ 22:00) (65 - 118)  BP: 100/66 (21 @ 22:00) (96/61 - 142/87)  RR: 18 (21 @ 22:00) (16 - 18)  SpO2: 100% (21 @ 22:00) (50% - 100%)    Gen: NAD, AOx3  CV: RRR, S1/S2 present  Pulm: CTAB  Abdomen:  Soft, non-tender, non-distended, +bowel sounds  Uterus:  Fundus firm below umbilicus  VE:  Expected lochia  Ext:  b/l LE non-tender                           6.9    x     )-----------( x        ( 2021 21:08 )             20.3              JOSEP CARRANZA is a 31y  now PPD#1 s/p spontaneous vaginal delivery at 39w2d gestation complicated by hemorrhage s/p IM Methergine, rectal Cytotec and 2u PRBC. Intrapartum course complicated by GDMA2.    S:    No acute events overnight.   The patient has no complaints.  Pain controlled with current treatment regimen.   She is ambulating without difficulty and tolerating PO.   + flatus/-BM/+ voiding   She endorses appropriate lochia, which is decreasing.   She is bottle feeding.  She denies fevers, chills, nausea and vomiting.   She denies lightheadedness, dizziness, palpitations, chest pain and SOB.     O:    T(C): 36.6 (21 @ 22:00), Max: 36.9 (21 @ 08:41)  HR: 84 (21 @ 22:00) (65 - 118)  BP: 100/66 (21 @ 22:00) (96/61 - 142/87)  RR: 18 (21 @ 22:00) (16 - 18)  SpO2: 100% (21 @ 22:00) (50% - 100%)    Gen: NAD, AOx3  CV: RRR, S1/S2 present  Pulm: CTAB  Abdomen:  Soft, non-tender, non-distended, +bowel sounds  Uterus:  Fundus firm below umbilicus  VE:  Expected lochia  Ext:  b/l LE non-tender                           6.9    x     )-----------( x        ( 2021 21:08 )             20.3              0

## 2021-11-12 NOTE — PROGRESS NOTE ADULT - ASSESSMENT
A/P:  JOSEP CARRANZA is a 31y  now PPD#1 s/p spontaneous vaginal delivery at 39w2d gestation complicated by hemorrhage s/p IM Methergine, rectal Cytotec and 2u PRBC. Intrapartum course complicated by GDMA2.  -Vital signs stable  -Hgb: 10.9>6.9>2U PRBC  -hx of hypothyroidism, home med synthroid ordered  -Voiding, tolerating PO  -Advance care as tolerated   -Continue routine postpartum care and education  -Healthy male infant, desires circumcision  -Dispo: Patient to be discharged when meeting all postpartum milestones and pending attending approval.

## 2021-11-12 NOTE — PROGRESS NOTE ADULT - ATTENDING COMMENTS
31y  now PPD#1 s/p spontaneous vaginal delivery at 39w2d gestation complicated by hemorrhage, now stable after transfusion 2 units PRBC. Consent for circumcision signed after questions answered.

## 2021-11-13 VITALS
HEART RATE: 77 BPM | RESPIRATION RATE: 18 BRPM | DIASTOLIC BLOOD PRESSURE: 82 MMHG | SYSTOLIC BLOOD PRESSURE: 124 MMHG | OXYGEN SATURATION: 98 % | TEMPERATURE: 98 F

## 2021-11-13 PROCEDURE — 86850 RBC ANTIBODY SCREEN: CPT

## 2021-11-13 PROCEDURE — 59050 FETAL MONITOR W/REPORT: CPT

## 2021-11-13 PROCEDURE — 59025 FETAL NON-STRESS TEST: CPT

## 2021-11-13 PROCEDURE — 80053 COMPREHEN METABOLIC PANEL: CPT

## 2021-11-13 PROCEDURE — G0463: CPT

## 2021-11-13 PROCEDURE — 85027 COMPLETE CBC AUTOMATED: CPT

## 2021-11-13 PROCEDURE — 88307 TISSUE EXAM BY PATHOLOGIST: CPT

## 2021-11-13 PROCEDURE — 36430 TRANSFUSION BLD/BLD COMPNT: CPT

## 2021-11-13 PROCEDURE — 85025 COMPLETE CBC W/AUTO DIFF WBC: CPT

## 2021-11-13 PROCEDURE — 86769 SARS-COV-2 COVID-19 ANTIBODY: CPT

## 2021-11-13 PROCEDURE — 86900 BLOOD TYPING SEROLOGIC ABO: CPT

## 2021-11-13 PROCEDURE — 85014 HEMATOCRIT: CPT

## 2021-11-13 PROCEDURE — 81001 URINALYSIS AUTO W/SCOPE: CPT

## 2021-11-13 PROCEDURE — 36415 COLL VENOUS BLD VENIPUNCTURE: CPT

## 2021-11-13 PROCEDURE — 86923 COMPATIBILITY TEST ELECTRIC: CPT

## 2021-11-13 PROCEDURE — 82150 ASSAY OF AMYLASE: CPT

## 2021-11-13 PROCEDURE — 86780 TREPONEMA PALLIDUM: CPT

## 2021-11-13 PROCEDURE — 82962 GLUCOSE BLOOD TEST: CPT

## 2021-11-13 PROCEDURE — 85018 HEMOGLOBIN: CPT

## 2021-11-13 PROCEDURE — P9016: CPT

## 2021-11-13 PROCEDURE — 86901 BLOOD TYPING SEROLOGIC RH(D): CPT

## 2021-11-13 PROCEDURE — 83690 ASSAY OF LIPASE: CPT

## 2021-11-13 RX ORDER — IBUPROFEN 200 MG
1 TABLET ORAL
Qty: 0 | Refills: 0 | DISCHARGE
Start: 2021-11-13

## 2021-11-13 RX ORDER — ACETAMINOPHEN 500 MG
3 TABLET ORAL
Qty: 0 | Refills: 0 | DISCHARGE
Start: 2021-11-13

## 2021-11-13 RX ORDER — ASPIRIN/CALCIUM CARB/MAGNESIUM 324 MG
0 TABLET ORAL
Qty: 0 | Refills: 0 | DISCHARGE

## 2021-11-13 RX ORDER — OMEGA-3 ACID ETHYL ESTERS 1 G
1 CAPSULE ORAL
Qty: 0 | Refills: 0 | DISCHARGE

## 2021-11-13 RX ADMIN — Medication 600 MILLIGRAM(S): at 13:30

## 2021-11-13 RX ADMIN — Medication 600 MILLIGRAM(S): at 07:30

## 2021-11-13 RX ADMIN — Medication 975 MILLIGRAM(S): at 09:30

## 2021-11-13 RX ADMIN — Medication 975 MILLIGRAM(S): at 08:38

## 2021-11-13 RX ADMIN — Medication 975 MILLIGRAM(S): at 03:29

## 2021-11-13 RX ADMIN — Medication 600 MILLIGRAM(S): at 12:41

## 2021-11-13 RX ADMIN — Medication 600 MILLIGRAM(S): at 06:41

## 2021-11-13 RX ADMIN — Medication 600 MILLIGRAM(S): at 00:44

## 2021-11-13 RX ADMIN — Medication 975 MILLIGRAM(S): at 04:10

## 2021-11-13 RX ADMIN — Medication 600 MILLIGRAM(S): at 01:30

## 2021-11-13 RX ADMIN — Medication 88 MICROGRAM(S): at 06:41

## 2021-11-13 NOTE — DISCHARGE NOTE OB - NS OB DC TDAP REASON NOT RECEIVED
I called pt, spoke with father Vamshi he was notified of test results for daughter violette. He was made aware of recommendations for weekly quants. He expressed concern about out of pocket costs for office visits with our office as we do not accept pts insurance/ are out of network. Vamshi was notified that she can go to in network lab and have the labs drawn that way that is cost effective for him. Vamshi agreed to this. Order was placed and he will have Violette have labs drawn Thursday/friday of this week and understands she will needs this drawn weekly until everything has returned to normal. He will  order for quant, he was notified I will call labs and see who is in network for them. Called [facility] and spoke with Courtney, per Courtney they are back in network with [facility] this year.    Refused

## 2021-11-13 NOTE — DISCHARGE NOTE OB - CARE PLAN
Principal Discharge DX:	Vaginal delivery  Assessment and plan of treatment:	1) Please take ibuprofen and/or Tylenol as needed for pain as prescribed.  2) Nothing in the vagina for 6 weeks (including no sex, no tampons, and no douching).  3) Please call your doctor for a follow up your postpartum appointment in 4 weeks.  4) Please continue taking vitamins postpartum. Take iron and colace for acute blood loss anemia.  5) Please call the office sooner if you have heavy vaginal bleeding, severe abdominal pain, or fever > 100.4F.  6) You may resume regular daily activity as tolerated   1

## 2021-11-13 NOTE — PROGRESS NOTE ADULT - SUBJECTIVE AND OBJECTIVE BOX
JOSEP CARRANZA is a 31y  now PPD#2 s/p spontaneous vaginal delivery at 39w2d gestation complicated by hemorrhage s/p IM Methergine, rectal Cytotec and 2u PRBC. Intrapartum course complicated by GDMA2.    S:    No acute events overnight.   The patient has no complaints.  Pain controlled with current treatment regimen.   She is ambulating without difficulty and tolerating PO.   + flatus/-BM/+ voiding   She endorses appropriate lochia, which is decreasing.   She is breastfeeding without difficulty.   She denies fevers, chills, nausea and vomiting.   She denies lightheadedness, dizziness, palpitations, chest pain and SOB.     O:    T(C): 36.6 (21 @ 03:44), Max: 36.7 (21 @ 15:40)  HR: 77 (21 @ 03:44) (77 - 92)  BP: 124/82 (21 @ 03:44) (111/76 - 124/82)  RR: 18 (21 @ 03:44) (18 - 18)  SpO2: 98% (21 @ 03:44) (98% - 100%)    Gen: NAD, AOx3  CV: RRR, S1/S2 present  Pulm: CTAB  Abdomen:  Soft, non-tender, non-distended, +bowel sounds  Uterus:  Fundus firm below umbilicus  VE:  Expected lochia  Ext:  b/l LE non-tender                           10.2   22.03 )-----------( 219      ( 2021 11:19 )             30.4

## 2021-11-13 NOTE — DISCHARGE NOTE OB - CARE PROVIDER_API CALL
Kajal Laboy)  OBSGYN  [facility]  [address] [place] [id number]  Phone: [phone]  Fax: [phone]  Follow Up Time:

## 2021-11-13 NOTE — DISCHARGE NOTE OB - MEDICATION SUMMARY - MEDICATIONS TO TAKE
I will START or STAY ON the medications listed below when I get home from the hospital:    acetaminophen 325 mg oral tablet  -- 3 tab(s) by mouth   -- Indication: For pain    ibuprofen 600 mg oral tablet  -- 1 tab(s) by mouth every 6 hours  -- Indication: For pain    Pepcid 20 mg oral tablet  -- 1 tab(s) by mouth 2 times a day  -- Indication: For home med

## 2021-11-13 NOTE — DISCHARGE NOTE OB - PATIENT PORTAL LINK FT
You can access the FollowMyHealth Patient Portal offered by [facility] by registering at the following website: [email or web address]. By joining [employer]’s FollowMyHealth portal, you will also be able to view your health information using other applications (apps) compatible with our system.

## 2021-11-13 NOTE — DISCHARGE NOTE OB - HOSPITAL COURSE
Patient underwent a normal spontaneous vaginal delivery. Post-partum course was complicated by hemorrhage, received IM Methergine, rectal Cytotec and 2U pRBC. Pain is well controlled with PRN medication. She has no difficulty with ambulation, voiding, or PO intake. Lab values and vital signs are within normal limits prior to discharge.

## 2021-11-15 ENCOUNTER — APPOINTMENT (OUTPATIENT)
Dept: OBGYN | Facility: CLINIC | Age: 31
End: 2021-11-15

## 2021-11-16 ENCOUNTER — APPOINTMENT (OUTPATIENT)
Dept: DISASTER EMERGENCY | Facility: CLINIC | Age: 31
End: 2021-11-16

## 2021-11-17 LAB — SARS-COV-2 N GENE NPH QL NAA+PROBE: NOT DETECTED

## 2021-11-18 LAB — SURGICAL PATHOLOGY STUDY: SIGNIFICANT CHANGE UP

## 2021-11-19 ENCOUNTER — RESULT REVIEW (OUTPATIENT)
Age: 31
End: 2021-11-19

## 2021-11-19 ENCOUNTER — INPATIENT (INPATIENT)
Facility: HOSPITAL | Age: 31
LOS: 3 days | Discharge: HOME | DRG: 418 | End: 2021-11-23
Attending: SURGERY | Admitting: SURGERY
Payer: MEDICAID

## 2021-11-19 VITALS
HEIGHT: 66 IN | HEART RATE: 76 BPM | OXYGEN SATURATION: 100 % | SYSTOLIC BLOOD PRESSURE: 122 MMHG | WEIGHT: 255.74 LBS | TEMPERATURE: 98 F | DIASTOLIC BLOOD PRESSURE: 67 MMHG | RESPIRATION RATE: 16 BRPM

## 2021-11-19 DIAGNOSIS — R10.11 RIGHT UPPER QUADRANT PAIN: ICD-10-CM

## 2021-11-19 DIAGNOSIS — K80.20 CALCULUS OF GALLBLADDER WITHOUT CHOLECYSTITIS WITHOUT OBSTRUCTION: ICD-10-CM

## 2021-11-19 DIAGNOSIS — R93.89 ABNORMAL FINDINGS ON DIAGNOSTIC IMAGING OF OTHER SPECIFIED BODY STRUCTURES: ICD-10-CM

## 2021-11-19 DIAGNOSIS — K08.409 PARTIAL LOSS OF TEETH, UNSPECIFIED CAUSE, UNSPECIFIED CLASS: Chronic | ICD-10-CM

## 2021-11-19 DIAGNOSIS — Z98.890 OTHER SPECIFIED POSTPROCEDURAL STATES: Chronic | ICD-10-CM

## 2021-11-19 LAB
BLD GP AB SCN SERPL QL: SIGNIFICANT CHANGE UP
GLUCOSE BLDC GLUCOMTR-MCNC: 92 MG/DL — SIGNIFICANT CHANGE UP (ref 70–99)

## 2021-11-19 PROCEDURE — 74018 RADEX ABDOMEN 1 VIEW: CPT | Mod: 26

## 2021-11-19 PROCEDURE — 88304 TISSUE EXAM BY PATHOLOGIST: CPT | Mod: 26

## 2021-11-19 RX ORDER — ACETAMINOPHEN 500 MG
975 TABLET ORAL ONCE
Refills: 0 | Status: COMPLETED | OUTPATIENT
Start: 2021-11-19 | End: 2021-11-19

## 2021-11-19 RX ORDER — SODIUM CHLORIDE 9 MG/ML
3 INJECTION INTRAMUSCULAR; INTRAVENOUS; SUBCUTANEOUS ONCE
Refills: 0 | Status: DISCONTINUED | OUTPATIENT
Start: 2021-11-19 | End: 2021-11-19

## 2021-11-19 RX ORDER — TRAMADOL HYDROCHLORIDE 50 MG/1
50 TABLET ORAL EVERY 6 HOURS
Refills: 0 | Status: DISCONTINUED | OUTPATIENT
Start: 2021-11-19 | End: 2021-11-23

## 2021-11-19 RX ORDER — ACETAMINOPHEN 500 MG
975 TABLET ORAL EVERY 6 HOURS
Refills: 0 | Status: DISCONTINUED | OUTPATIENT
Start: 2021-11-19 | End: 2021-11-23

## 2021-11-19 RX ORDER — FAMOTIDINE 10 MG/ML
1 INJECTION INTRAVENOUS
Qty: 0 | Refills: 0 | DISCHARGE

## 2021-11-19 RX ORDER — HYDROMORPHONE HYDROCHLORIDE 2 MG/ML
0.5 INJECTION INTRAMUSCULAR; INTRAVENOUS; SUBCUTANEOUS EVERY 4 HOURS
Refills: 0 | Status: DISCONTINUED | OUTPATIENT
Start: 2021-11-19 | End: 2021-11-20

## 2021-11-19 RX ORDER — FAMOTIDINE 10 MG/ML
20 INJECTION INTRAVENOUS
Refills: 0 | Status: DISCONTINUED | OUTPATIENT
Start: 2021-11-19 | End: 2021-11-23

## 2021-11-19 RX ORDER — CEFAZOLIN SODIUM 1 G
2000 VIAL (EA) INJECTION ONCE
Refills: 0 | Status: COMPLETED | OUTPATIENT
Start: 2021-11-19 | End: 2021-11-19

## 2021-11-19 RX ORDER — TRAMADOL HYDROCHLORIDE 50 MG/1
25 TABLET ORAL EVERY 6 HOURS
Refills: 0 | Status: DISCONTINUED | OUTPATIENT
Start: 2021-11-19 | End: 2021-11-23

## 2021-11-19 RX ORDER — SODIUM CHLORIDE 9 MG/ML
1000 INJECTION, SOLUTION INTRAVENOUS
Refills: 0 | Status: DISCONTINUED | OUTPATIENT
Start: 2021-11-19 | End: 2021-11-19

## 2021-11-19 RX ORDER — FENTANYL CITRATE 50 UG/ML
25 INJECTION INTRAVENOUS
Refills: 0 | Status: DISCONTINUED | OUTPATIENT
Start: 2021-11-19 | End: 2021-11-19

## 2021-11-19 RX ORDER — ONDANSETRON 8 MG/1
4 TABLET, FILM COATED ORAL EVERY 6 HOURS
Refills: 0 | Status: DISCONTINUED | OUTPATIENT
Start: 2021-11-19 | End: 2021-11-23

## 2021-11-19 RX ORDER — LEVOTHYROXINE SODIUM 125 MCG
88 TABLET ORAL DAILY
Refills: 0 | Status: DISCONTINUED | OUTPATIENT
Start: 2021-11-19 | End: 2021-11-23

## 2021-11-19 RX ORDER — ENOXAPARIN SODIUM 100 MG/ML
40 INJECTION SUBCUTANEOUS AT BEDTIME
Refills: 0 | Status: DISCONTINUED | OUTPATIENT
Start: 2021-11-20 | End: 2021-11-21

## 2021-11-19 RX ORDER — SODIUM CHLORIDE 9 MG/ML
1000 INJECTION, SOLUTION INTRAVENOUS
Refills: 0 | Status: DISCONTINUED | OUTPATIENT
Start: 2021-11-19 | End: 2021-11-23

## 2021-11-19 RX ORDER — LEVOTHYROXINE SODIUM 125 MCG
1 TABLET ORAL
Qty: 0 | Refills: 0 | DISCHARGE

## 2021-11-19 RX ADMIN — HYDROMORPHONE HYDROCHLORIDE 0.5 MILLIGRAM(S): 2 INJECTION INTRAMUSCULAR; INTRAVENOUS; SUBCUTANEOUS at 14:12

## 2021-11-19 RX ADMIN — FAMOTIDINE 20 MILLIGRAM(S): 10 INJECTION INTRAVENOUS at 18:40

## 2021-11-19 RX ADMIN — Medication 975 MILLIGRAM(S): at 10:25

## 2021-11-19 RX ADMIN — Medication 975 MILLIGRAM(S): at 22:21

## 2021-11-19 RX ADMIN — TRAMADOL HYDROCHLORIDE 50 MILLIGRAM(S): 50 TABLET ORAL at 17:48

## 2021-11-19 RX ADMIN — Medication 975 MILLIGRAM(S): at 22:20

## 2021-11-19 RX ADMIN — HYDROMORPHONE HYDROCHLORIDE 0.5 MILLIGRAM(S): 2 INJECTION INTRAMUSCULAR; INTRAVENOUS; SUBCUTANEOUS at 14:40

## 2021-11-19 RX ADMIN — Medication 100 MILLIGRAM(S): at 10:45

## 2021-11-19 NOTE — CHART NOTE - NSCHARTNOTEFT_GEN_A_CORE
POST-OP CHECK    Patient is POD #0 s/p Laparoscopic cholecystectomy with intraoperative cholangiogram.  She is doing well in the postoperative period and rates her pain as a 4/10.  Pain is well controlled with medications Tylenol and Tramadol.  She has urinated about 800cc without difficulty.  Patient has tolerated water and ice chips without issue and will order CLD tray for the AM.     Diet, Clear Liquid (11-19-21 @ 18:13)      Scheduled Medications:   acetaminophen     Tablet .. 975 milliGRAM(s) Oral every 6 hours  famotidine    Tablet 20 milliGRAM(s) Oral two times a day  lactated ringers. 1000 milliLiter(s) (75 mL/Hr) IV Continuous <Continuous>  levothyroxine 88 MICROGram(s) Oral daily  multivitamin 1 Tablet(s) Oral daily    PRN Medications:  HYDROmorphone  Injectable 0.5 milliGRAM(s) IV Push every 4 hours PRN breakthrough pain  ondansetron Injectable 4 milliGRAM(s) IV Push every 6 hours PRN Nausea  traMADol 25 milliGRAM(s) Oral every 6 hours PRN Moderate Pain (4 - 6)  traMADol 50 milliGRAM(s) Oral every 6 hours PRN Severe Pain (7 - 10)      Objective:   T(F): 98.6 (11-19 @ 17:01), Max: 98.6 (11-19 @ 17:01)  HR: 97 (11-19 @ 17:01) (76 - 98)  BP: 138/83 (11-19 @ 17:01) (111/73 - 138/83)  BP(mean): 93 (11-19 @ 15:30) (72 - 93)  ABP: --  ABP(mean): --  RR: 17 (11-19 @ 17:01) (15 - 20)  SpO2: 97% (11-19 @ 17:01) (96% - 100%)      Physical Exam:   GEN: patient resting comfortably in bed, in no acute distress  RESP: respirations are unlabored, no accessory muscle use, no conversational dyspnea  CVS: RRR  GI: abdomen soft, appropriately tender around incision sites, non-distended, no rebound tenderness / guarding; steri strips with strikethrough however gauze overtop c/d/i    I&O's    11-19 @ 07:01  -  11-19 @ 20:09  --------------------------------------------------------  IN:  Total IN: 0 mL    OUT:    Voided (mL): 800 mL  Total OUT: 800 mL    Total NET: -800 mL          LABS:                MICROBIOLOGY:       PATHOLOGY:  Surgical Pathology Report:   ACCESSION No:  [id number]      Accession:                             [id number]    Collected Date/Time:                   11/11/2021 13:22 EST  Received Date/Time:                    11/12/2021 09:49 EST    Surgical Pathology Report - Auth (Verified)    Specimen(s) Submitted  1  Placenta    Final Diagnosis    Placenta:  -   Trivascular umbilical cord.  -   Histologically unremarkable chorionic membranes.    -   Placenta with focal villous infarction and calcifications.  Verified by: Julio Cesar Cheema MD  (Electronic Signature)  Reported on: 11/18/21 15:48 EST, [facility], [address] [place] [id number]  _________________________________________________________________      Clinical Information  GDM    Gross Description  Received:  Fresh labeled  "placenta"  Extraplacental membranes:  Completeness:  Color: Pink-tan  Appearance:  Opaque  Insertion:  Marginal  Umbilical cord:  Length:  Attached: 22 cm  Diameter:  Attached: 1.1 cm  Color: Tan-white  Vessels: 3  True Knot:  Absent  Insertion:  Eccentric, 5.3 cm from edge of disc  Placental disc:  Shape: Round  Weight: 453.6 g (trimmed)  Dimensions:  15.5 x 15.5 x 2.5 cm  Fetal Surface:  Blue-gray, smooth, and glistening  Vessels: Diffuse pattern ofarborization from cord insertion site  Maternal Surface:  Disrupted with complete cotyledons  Parenchyma:  Dark red and spongy  Lesion: One, measuring 1.8 x 1.0 x 0.5 cm, tan white consolidated  focus, Approximately less than 2% disc involved  Retroplacental Clot:  Present, Loose without maternal surface  depression.    Submitted:  Representative sections in 3 cassettes:  1A: Cord and membrane  1B: Representative lesion and representative placenta  1C: Representative placenta    Lorie Olivier 11/15/2021 08:48 AM      Disclaimer  In addition to other data that may appear on the specimen containers, all  labels have been inspected to confirm the presence of the patient's name  and date of birth.    Histological Processing Performed at [facility],  Department of Pathology, [address] [place] [address]. (11-11-21 @ 13:22)      Assessment and Plan  Patient is now postpartum with biliary pancreatitis approx 3 weeks ago while she was 37 weeks gestation. Now POD #0 s/p Lap cholecystectomy.  IOC revealed a CBD defect without obstruction.       Plan  CLD, FU diet tolerance  F/u AM labs  Endoscopic ultrasound with or without ERCP on Monday or Tuesday, 11/22 or 11/23  FU GI plans

## 2021-11-19 NOTE — CONSULT NOTE ADULT - PROBLEM SELECTOR RECOMMENDATION 9
Intraoperative cholangiogram with possible CBD stone, patient with cholecystitis 3 weeks ago while she was 37 weeks gestation,   Patient is now s/p Lap cholecystectomy, no   Will order routine lab works  GI will continue to follow  Patient will be evaluated by Dr. Edmonds this Monday  Will plan for possible endoscopic ultrasound /ERCP on Monday or Tuesday Intraoperative cholangiogram with possible CBD stone, patient with biliary pancreatitis 3 weeks ago while she was 37 weeks gestation,   Patient is now s/p Lap cholecystectomy, no   Will order routine lab works  GI will continue to follow  Patient will be evaluated by Dr. Edmonds this Monday  Will plan for possible endoscopic ultrasound /ERCP on Monday or Tuesday

## 2021-11-19 NOTE — PATIENT PROFILE ADULT - INFLUENZA IMMUNIZATION DATE (APPROXIMATE)
Ear Infection in Children    WHAT YOU NEED TO KNOW:    An ear infection is also called otitis media. Your child may have an ear infection in one or both ears. Your child may get an ear infection when his or her eustachian tubes become swollen or blocked. Eustachian tubes drain fluid away from the middle ear. Your child may have a buildup of fluid and pressure in his or her ear when he or she has an ear infection. The ear may become infected by germs. The germs grow easily in fluid trapped behind the eardrum.     DISCHARGE INSTRUCTIONS:    Seek care immediately if:    You see blood or pus draining from your child's ear.    Your child seems confused or cannot stay awake.    Your child has a stiff neck, headache, and a fever.    Contact your child's healthcare provider if:     Your child has a fever.    Your child is still not eating or drinking 24 hours after he or she takes medicine.    Your child has pain behind his or her ear or when you move the earlobe.    Your child's ear is sticking out from his or her head.    Your child still has signs and symptoms of an ear infection 48 hours after he or she takes medicine.    You have questions or concerns about your child's condition or care.    Medicines:    Medicines may be given to decrease your child's pain or fever, or to treat an infection caused by bacteria.    Do not give aspirin to children under 18 years of age. Your child could develop Reye syndrome if he takes aspirin. Reye syndrome can cause life-threatening brain and liver damage. Check your child's medicine labels for aspirin, salicylates, or oil of wintergreen.    Give your child's medicine as directed. Contact your child's healthcare provider if you think the medicine is not working as expected. Tell him or her if your child is allergic to any medicine. Keep a current list of the medicines, vitamins, and herbs your child takes. Include the amounts, and when, how, and why they are taken. Bring the list or the medicines in their containers to follow-up visits. Carry your child's medicine list with you in case of an emergency.  Ofloxcin 3 twice daily x 5 days   Care for your child at home:    Prop your older child's head and chest up while he or she sleeps. This may decrease ear pressure and pain. Ask your child's healthcare provider how to safely prop your child's head and chest up.      Have your child lie with his or her infected ear facing down to allow fluid to drain from the ear.    Use ice or heat to help decrease your child's ear pain. Ask which of these is best for your child, and use as directed.    Ask about ways to keep water out of your child's ears when he or she bathes or swims. 01-Oct-2021

## 2021-11-19 NOTE — BRIEF OPERATIVE NOTE - NSICDXBRIEFPROCEDURE_GEN_ALL_CORE_FT
PROCEDURES:  Cholecystectomy, laparoscopic, with intraoperative cholangiogram 19-Nov-2021 13:40:24  Perla Perez

## 2021-11-19 NOTE — CONSULT NOTE ADULT - SUBJECTIVE AND OBJECTIVE BOX
31 year olf F with recently had a vaginal delivery on 21, with past medical history of hypothyroidism, pilocystic ovarian syndrome, gestational diabetes, presented to [facility] for an laparoscopic cholecystectomy. Patient reported having right upper abdominal pain, nausea, "gas pain", malaise for past 3 weeks once she was 37 weeks of gestational age". She has had radiology imaging and that revealed gall stones at that time. Patient reported sharp non-radiating abdominal pain lasting anywhere between 2 hours to 2 days, pain was worsened after eating. Patient was admitted here at [facility], RUQ revealed cholelithiasis, and was treated conservatively and discharged on 10/29/2021. Patient had a normal vaginal birth on , received 2 units of packed RBCs for post labour. she was discharged home . Gastroenterology is now consulter after revealing CBD stone on an intra operative cholangiogram. At the time of my evaluation patient is somewhat under anesthesia post Lap milton, able to answer questions. Patient denies nausea, vomiting or diarrhea, fever, chills. Rported normal post partum recovery.        Review of Systems:  · ENMT: negative  · Respiratory and Thorax: negative  · Cardiovascular: negative  · Gastrointestinal: see above.  · Genitourinary:	negative  · Musculoskeletal: negative  · Neurological: negative  · Psychiatric: negative  · Hematology/Lymphatics: negative  · Endocrine: negative      PAST MEDICAL/SURGICAL HISTORY:  Hypothyroid    Gestational diabetes    Right upper quadrant pain    Gallbladder stone without cholecystitis or obstruction     1, currently pregnant    H/O ovarian cystectomy    [place] teeth removed      SOCIAL HISTORY:  - TOBACCO: Denies  - ALCOHOL: Denies  - ILLICIT DRUG USE: Denies    FAMILY HISTORY:  No known history of gastrointestinal or liver disease;  FH: breast cancer (Aunt)    FHx: diabetes mellitus (Mother)    FH: colon cancer (Uncle)        HOME MEDICATIONS:  acetaminophen 325 mg oral tablet: 3 tab(s) orally  (2021 09:25)  ibuprofen 600 mg oral tablet: 1 tab(s) orally every 6 hours (2021 09:25)  levothyroxine 88 mcg (0.088 mg) oral tablet: 1 tab(s) orally once a day (2021 09:25)  Motrin 600 mg oral tablet: 1 tab(s) orally every 6 hours, As Needed (2021 09:25)  Pepcid 20 mg oral tablet: 1 tab(s) orally 2 times a day (2021 09:25)    INPATIENT MEDICATIONS:  MEDICATIONS  (STANDING):  acetaminophen     Tablet .. 975 milliGRAM(s) Oral Once  acetaminophen     Tablet .. 975 milliGRAM(s) Oral every 6 hours  ceFAZolin   IVPB 2000 milliGRAM(s) IV Intermittent Once  famotidine    Tablet 20 milliGRAM(s) Oral two times a day  lactated ringers. 1000 milliLiter(s) (75 mL/Hr) IV Continuous <Continuous>  lactated ringers. 1000 milliLiter(s) (75 mL/Hr) IV Continuous <Continuous>  levothyroxine 88 MICROGram(s) Oral daily  multivitamin 1 Tablet(s) Oral daily    MEDICATIONS  (PRN):  fentaNYL    Injectable 25 MICROGram(s) IV Push every 5 minutes PRN Moderate Pain (4 - 6)  HYDROmorphone  Injectable 0.5 milliGRAM(s) IV Push every 4 hours PRN breakthrough pain  ondansetron Injectable 4 milliGRAM(s) IV Push every 6 hours PRN Nausea  traMADol 25 milliGRAM(s) Oral every 6 hours PRN Moderate Pain (4 - 6)  traMADol 50 milliGRAM(s) Oral every 6 hours PRN Severe Pain (7 - 10)    ALLERGIES:  chloroseptic spray (Short breath)  No Known Drug Allergies    T(C): 36.7 (21 @ 13:30), Max: 36.7 (21 @ 13:30)  HR: 90 (21 @ 13:30) (76 - 90)  BP: 111/73 (21 @ 13:30) (111/73 - 122/67)  RR: 16 (21 @ 13:30) (16 - 16)  SpO2: 100% (21 @ 13:30) (100% - 100%)      PHYSICAL EXAM:  Constitutional: No acute distress  Neuro: Awake alert, oriented to person, place and situation, non-focal, speech clear and intact  HEENT: PERRL, anicteric sclerae, oral mucosa pink and moist  Neck: supple, no JVD  CV: regular rate, regular rhythm, +S1S2,   Pulm/chest: lung sounds CTA and equal bilaterally, no accessory muscle use noted  Abd: soft, nondistended, mild tenderness near to lap. milton incisions, no hematoma or bleeding noted, intact steri strips. hypoactive bowel sounds  Ext: no Cyanosis, clubbing or edema  Skin: warm, well perfused, no jaundice   Psych: calm, appropriate affect       31 year olf F with recently had a vaginal delivery on 21, with past medical history of hypothyroidism, pilocystic ovarian syndrome, gestational diabetes, presented to [facility] for an laparoscopic cholecystectomy. Patient reported having right upper abdominal pain, nausea, "gas pain", malaise for past 3 weeks once she was 37 weeks of gestational age". She has had radiology imaging and that revealed gall stones at that time. Patient reported sharp non-radiating abdominal pain lasting anywhere between 2 hours to 2 days, pain was worsened after eating. Patient was admitted here at [facility], RUQ revealed cholelithiasis, and was treated conservatively and discharged on 10/29/2021. Patient had a normal vaginal birth on , received 2 units of packed RBCs for post partum hemorrhage, she was discharged home . Gastroenterology is now consulter after revealing possible CBD stone on an intra operative cholangiogram. At the time of my evaluation patient is somewhat under anesthesia post Lap milton, able to answer questions. Patient denies nausea, vomiting or diarrhea, fever, chills. Reported normal post partum recovery.        Review of Systems:  · ENMT: negative  · Respiratory and Thorax: negative  · Cardiovascular: negative  · Gastrointestinal: see above.  · Genitourinary:	negative  · Musculoskeletal: negative  · Neurological: negative  · Psychiatric: negative  · Hematology/Lymphatics: negative  · Endocrine: negative      PAST MEDICAL/SURGICAL HISTORY:  Hypothyroid    Gestational diabetes    Right upper quadrant pain    Gallbladder stone without cholecystitis or obstruction     1, currently pregnant    H/O ovarian cystectomy    [place] teeth removed      SOCIAL HISTORY:  - TOBACCO: Denies  - ALCOHOL: Denies  - ILLICIT DRUG USE: Denies    FAMILY HISTORY:  No known history of gastrointestinal or liver disease;  FH: breast cancer (Aunt)    FHx: diabetes mellitus (Mother)    FH: colon cancer (Uncle)        HOME MEDICATIONS:  acetaminophen 325 mg oral tablet: 3 tab(s) orally  (2021 09:25)  ibuprofen 600 mg oral tablet: 1 tab(s) orally every 6 hours (2021 09:25)  levothyroxine 88 mcg (0.088 mg) oral tablet: 1 tab(s) orally once a day (2021 09:25)  Motrin 600 mg oral tablet: 1 tab(s) orally every 6 hours, As Needed (2021 09:25)  Pepcid 20 mg oral tablet: 1 tab(s) orally 2 times a day (2021 09:25)    INPATIENT MEDICATIONS:  MEDICATIONS  (STANDING):  acetaminophen     Tablet .. 975 milliGRAM(s) Oral Once  acetaminophen     Tablet .. 975 milliGRAM(s) Oral every 6 hours  ceFAZolin   IVPB 2000 milliGRAM(s) IV Intermittent Once  famotidine    Tablet 20 milliGRAM(s) Oral two times a day  lactated ringers. 1000 milliLiter(s) (75 mL/Hr) IV Continuous <Continuous>  lactated ringers. 1000 milliLiter(s) (75 mL/Hr) IV Continuous <Continuous>  levothyroxine 88 MICROGram(s) Oral daily  multivitamin 1 Tablet(s) Oral daily    MEDICATIONS  (PRN):  fentaNYL    Injectable 25 MICROGram(s) IV Push every 5 minutes PRN Moderate Pain (4 - 6)  HYDROmorphone  Injectable 0.5 milliGRAM(s) IV Push every 4 hours PRN breakthrough pain  ondansetron Injectable 4 milliGRAM(s) IV Push every 6 hours PRN Nausea  traMADol 25 milliGRAM(s) Oral every 6 hours PRN Moderate Pain (4 - 6)  traMADol 50 milliGRAM(s) Oral every 6 hours PRN Severe Pain (7 - 10)    ALLERGIES:  chloroseptic spray (Short breath)  No Known Drug Allergies    T(C): 36.7 (21 @ 13:30), Max: 36.7 (21 @ 13:30)  HR: 90 (21 @ 13:30) (76 - 90)  BP: 111/73 (21 @ 13:30) (111/73 - 122/67)  RR: 16 (21 @ 13:30) (16 - 16)  SpO2: 100% (21 @ 13:30) (100% - 100%)      PHYSICAL EXAM:  Constitutional: No acute distress  Neuro: Awake alert, oriented to person, place and situation, non-focal, speech clear and intact  HEENT: PERRL, anicteric sclerae, oral mucosa pink and moist  Neck: supple, no JVD  CV: regular rate, regular rhythm, +S1S2,   Pulm/chest: lung sounds CTA and equal bilaterally, no accessory muscle use noted  Abd: soft, nondistended, mild tenderness near to lap. milton incisions, no hematoma or bleeding noted, intact steri strips. hypoactive bowel sounds  Ext: no Cyanosis, clubbing or edema  Skin: warm, well perfused, no jaundice   Psych: calm, appropriate affect       31 year olf F with recently had a vaginal delivery on 21, with past medical history of hypothyroidism, polycystic ovarian syndrome, gestational diabetes, presented to [facility] for an laparoscopic cholecystectomy. Patient reported having right upper abdominal pain, nausea, vomiting, "gas pain" once she was 37 weeks of gestational age". She has had radiology imaging and that revealed gall stones at that time. Patient reported sharp non-radiating abdominal pain lasting anywhere between 2 hours to 2 days, pain was worsened after eating. Patient was admitted here at [facility] Late October for worsening pain with nausea and vomiting and a lipase of 3000 and amylase of 2100. RUQ revealed cholelithiasis, no biliary dilation or inflammation  and was treated conservatively and discharged on 10/29/2021. Since that time she has been asymptomatic. Patient had a normal vaginal birth on , received 2 units of packed RBCs for post partum hemorrhage, she was discharged home . Gastroenterology is now consulter after revealing possible CBD stone on an intra operative cholangiogram during Laparoscopic cholecystectomy today that was otherwise uneventful. At the time of my evaluation patient is somewhat under anesthesia post Lap milton, able to answer questions. Patient denies nausea, vomiting or diarrhea, fever, chills. Reported normal post partum recovery.        Review of Systems:  · ENMT: negative  · Respiratory and Thorax: negative  · Cardiovascular: negative  · Gastrointestinal: see above.  · Genitourinary:	negative  · Musculoskeletal: negative  · Neurological: negative  · Psychiatric: negative  · Hematology/Lymphatics: negative  · Endocrine: negative      PAST MEDICAL/SURGICAL HISTORY:  Hypothyroid    Gestational diabetes    Right upper quadrant pain    Gallbladder stone without cholecystitis or obstruction     1, currently pregnant    H/O ovarian cystectomy    [place] teeth removed      SOCIAL HISTORY:  - TOBACCO: Denies  - ALCOHOL: Denies  - ILLICIT DRUG USE: Denies    FAMILY HISTORY:  No known history of gastrointestinal or liver disease;  FH: breast cancer (Aunt)    FHx: diabetes mellitus (Mother)    FH: colon cancer (Uncle)        HOME MEDICATIONS:  acetaminophen 325 mg oral tablet: 3 tab(s) orally  (2021 09:25)  ibuprofen 600 mg oral tablet: 1 tab(s) orally every 6 hours (2021 09:25)  levothyroxine 88 mcg (0.088 mg) oral tablet: 1 tab(s) orally once a day (2021 09:25)  Motrin 600 mg oral tablet: 1 tab(s) orally every 6 hours, As Needed (2021 09:25)  Pepcid 20 mg oral tablet: 1 tab(s) orally 2 times a day (2021 09:25)    INPATIENT MEDICATIONS:  MEDICATIONS  (STANDING):  acetaminophen     Tablet .. 975 milliGRAM(s) Oral Once  acetaminophen     Tablet .. 975 milliGRAM(s) Oral every 6 hours  ceFAZolin   IVPB 2000 milliGRAM(s) IV Intermittent Once  famotidine    Tablet 20 milliGRAM(s) Oral two times a day  lactated ringers. 1000 milliLiter(s) (75 mL/Hr) IV Continuous <Continuous>  lactated ringers. 1000 milliLiter(s) (75 mL/Hr) IV Continuous <Continuous>  levothyroxine 88 MICROGram(s) Oral daily  multivitamin 1 Tablet(s) Oral daily    MEDICATIONS  (PRN):  fentaNYL    Injectable 25 MICROGram(s) IV Push every 5 minutes PRN Moderate Pain (4 - 6)  HYDROmorphone  Injectable 0.5 milliGRAM(s) IV Push every 4 hours PRN breakthrough pain  ondansetron Injectable 4 milliGRAM(s) IV Push every 6 hours PRN Nausea  traMADol 25 milliGRAM(s) Oral every 6 hours PRN Moderate Pain (4 - 6)  traMADol 50 milliGRAM(s) Oral every 6 hours PRN Severe Pain (7 - 10)    ALLERGIES:  chloroseptic spray (Short breath)  No Known Drug Allergies    T(C): 36.7 (21 @ 13:30), Max: 36.7 (21 @ 13:30)  HR: 90 (21 @ 13:30) (76 - 90)  BP: 111/73 (21 @ 13:30) (111/73 - 122/67)  RR: 16 (21 @ 13:30) (16 - 16)  SpO2: 100% (21 @ 13:30) (100% - 100%)      PHYSICAL EXAM:  Constitutional: overweight female no acute distress  Neuro: Awake alert, oriented to person, place and situation, non-focal, speech clear and intact  HEENT: PERRL, anicteric sclerae, oral mucosa pink and moist  Neck: supple, no JVD  CV: regular rate, regular rhythm, +S1S2,   Pulm/chest: lung sounds CTA and equal bilaterally, no accessory muscle use noted  Abd: soft, nondistended, mild tenderness near to lap. milton incisions, no hematoma or bleeding noted, intact steri strips. hypoactive bowel sounds  Ext: no Cyanosis, clubbing or edema  Skin: warm, well perfused, no jaundice   Psych: calm, appropriate affect

## 2021-11-19 NOTE — CONSULT NOTE ADULT - ATTENDING COMMENTS
In all probability the patient did indeed have a mild episode of gallstone pancreatitis several weeks ago from which she recovered.  However on IOC today there appears to be a defect in the distal duct without obstruction.  At this time she is immediately postop.  We will obtain a CBC, comprehensive metabolic profile and prothrombin time.  Early next week she will require either an EUS with possible ERCP or just ERCP depending upon the results of the above and his subsequent course as well as in consultation with Dr. Edmonds.  Otherwise recommend routine postoperative care.

## 2021-11-20 DIAGNOSIS — K80.50 CALCULUS OF BILE DUCT WITHOUT CHOLANGITIS OR CHOLECYSTITIS WITHOUT OBSTRUCTION: ICD-10-CM

## 2021-11-20 LAB
ALBUMIN SERPL ELPH-MCNC: 3.2 G/DL — LOW (ref 3.3–5.2)
ALP SERPL-CCNC: 78 U/L — SIGNIFICANT CHANGE UP (ref 40–120)
ALT FLD-CCNC: 39 U/L — HIGH
ANION GAP SERPL CALC-SCNC: 14 MMOL/L — SIGNIFICANT CHANGE UP (ref 5–17)
APTT BLD: 33.1 SEC — SIGNIFICANT CHANGE UP (ref 27.5–35.5)
AST SERPL-CCNC: 39 U/L — HIGH
BASOPHILS # BLD AUTO: 0.04 K/UL — SIGNIFICANT CHANGE UP (ref 0–0.2)
BASOPHILS NFR BLD AUTO: 0.3 % — SIGNIFICANT CHANGE UP (ref 0–2)
BILIRUB DIRECT SERPL-MCNC: 0.1 MG/DL — SIGNIFICANT CHANGE UP (ref 0–0.3)
BILIRUB INDIRECT FLD-MCNC: 0.2 MG/DL — SIGNIFICANT CHANGE UP (ref 0.2–1)
BILIRUB SERPL-MCNC: 0.2 MG/DL — LOW (ref 0.4–2)
BUN SERPL-MCNC: 8.4 MG/DL — SIGNIFICANT CHANGE UP (ref 8–20)
CALCIUM SERPL-MCNC: 8.5 MG/DL — LOW (ref 8.6–10.2)
CHLORIDE SERPL-SCNC: 102 MMOL/L — SIGNIFICANT CHANGE UP (ref 98–107)
CO2 SERPL-SCNC: 20 MMOL/L — LOW (ref 22–29)
COVID-19 NUCLEOCAPSID GAM AB INTERP: POSITIVE
COVID-19 NUCLEOCAPSID TOTAL GAM ANTIBODY RESULT: 255 INDEX — HIGH
COVID-19 SPIKE DOMAIN AB INTERP: POSITIVE
COVID-19 SPIKE DOMAIN ANTIBODY RESULT: >250 U/ML — HIGH
CREAT SERPL-MCNC: 0.55 MG/DL — SIGNIFICANT CHANGE UP (ref 0.5–1.3)
EOSINOPHIL # BLD AUTO: 0.01 K/UL — SIGNIFICANT CHANGE UP (ref 0–0.5)
EOSINOPHIL NFR BLD AUTO: 0.1 % — SIGNIFICANT CHANGE UP (ref 0–6)
GLUCOSE SERPL-MCNC: 106 MG/DL — HIGH (ref 70–99)
HCT VFR BLD CALC: 27.8 % — LOW (ref 34.5–45)
HGB BLD-MCNC: 9.2 G/DL — LOW (ref 11.5–15.5)
IMM GRANULOCYTES NFR BLD AUTO: 1.2 % — SIGNIFICANT CHANGE UP (ref 0–1.5)
INR BLD: 1.16 RATIO — SIGNIFICANT CHANGE UP (ref 0.88–1.16)
LYMPHOCYTES # BLD AUTO: 18 % — SIGNIFICANT CHANGE UP (ref 13–44)
LYMPHOCYTES # BLD AUTO: 2.22 K/UL — SIGNIFICANT CHANGE UP (ref 1–3.3)
MAGNESIUM SERPL-MCNC: 1.9 MG/DL — SIGNIFICANT CHANGE UP (ref 1.6–2.6)
MCHC RBC-ENTMCNC: 29.3 PG — SIGNIFICANT CHANGE UP (ref 27–34)
MCHC RBC-ENTMCNC: 33.1 GM/DL — SIGNIFICANT CHANGE UP (ref 32–36)
MCV RBC AUTO: 88.5 FL — SIGNIFICANT CHANGE UP (ref 80–100)
MONOCYTES # BLD AUTO: 0.88 K/UL — SIGNIFICANT CHANGE UP (ref 0–0.9)
MONOCYTES NFR BLD AUTO: 7.1 % — SIGNIFICANT CHANGE UP (ref 2–14)
NEUTROPHILS # BLD AUTO: 9.04 K/UL — HIGH (ref 1.8–7.4)
NEUTROPHILS NFR BLD AUTO: 73.3 % — SIGNIFICANT CHANGE UP (ref 43–77)
PHOSPHATE SERPL-MCNC: 3.6 MG/DL — SIGNIFICANT CHANGE UP (ref 2.4–4.7)
PLATELET # BLD AUTO: 384 K/UL — SIGNIFICANT CHANGE UP (ref 150–400)
POTASSIUM SERPL-MCNC: 3.9 MMOL/L — SIGNIFICANT CHANGE UP (ref 3.5–5.3)
POTASSIUM SERPL-SCNC: 3.9 MMOL/L — SIGNIFICANT CHANGE UP (ref 3.5–5.3)
PROT SERPL-MCNC: 6.2 G/DL — LOW (ref 6.6–8.7)
PROTHROM AB SERPL-ACNC: 13.4 SEC — SIGNIFICANT CHANGE UP (ref 10.6–13.6)
RBC # BLD: 3.14 M/UL — LOW (ref 3.8–5.2)
RBC # FLD: 12.7 % — SIGNIFICANT CHANGE UP (ref 10.3–14.5)
SARS-COV-2 IGG+IGM SERPL QL IA: 255 INDEX — HIGH
SARS-COV-2 IGG+IGM SERPL QL IA: >250 U/ML — HIGH
SARS-COV-2 IGG+IGM SERPL QL IA: POSITIVE
SARS-COV-2 IGG+IGM SERPL QL IA: POSITIVE
SODIUM SERPL-SCNC: 136 MMOL/L — SIGNIFICANT CHANGE UP (ref 135–145)
WBC # BLD: 12.34 K/UL — HIGH (ref 3.8–10.5)
WBC # FLD AUTO: 12.34 K/UL — HIGH (ref 3.8–10.5)

## 2021-11-20 PROCEDURE — 99024 POSTOP FOLLOW-UP VISIT: CPT | Mod: GC

## 2021-11-20 PROCEDURE — 99233 SBSQ HOSP IP/OBS HIGH 50: CPT

## 2021-11-20 RX ADMIN — TRAMADOL HYDROCHLORIDE 50 MILLIGRAM(S): 50 TABLET ORAL at 23:23

## 2021-11-20 RX ADMIN — TRAMADOL HYDROCHLORIDE 50 MILLIGRAM(S): 50 TABLET ORAL at 07:34

## 2021-11-20 RX ADMIN — TRAMADOL HYDROCHLORIDE 50 MILLIGRAM(S): 50 TABLET ORAL at 17:12

## 2021-11-20 RX ADMIN — Medication 1 TABLET(S): at 12:06

## 2021-11-20 RX ADMIN — Medication 975 MILLIGRAM(S): at 23:23

## 2021-11-20 RX ADMIN — TRAMADOL HYDROCHLORIDE 50 MILLIGRAM(S): 50 TABLET ORAL at 00:00

## 2021-11-20 RX ADMIN — Medication 88 MICROGRAM(S): at 05:39

## 2021-11-20 RX ADMIN — TRAMADOL HYDROCHLORIDE 50 MILLIGRAM(S): 50 TABLET ORAL at 08:20

## 2021-11-20 RX ADMIN — FAMOTIDINE 20 MILLIGRAM(S): 10 INJECTION INTRAVENOUS at 05:39

## 2021-11-20 RX ADMIN — Medication 975 MILLIGRAM(S): at 05:39

## 2021-11-20 RX ADMIN — Medication 975 MILLIGRAM(S): at 18:27

## 2021-11-20 RX ADMIN — FAMOTIDINE 20 MILLIGRAM(S): 10 INJECTION INTRAVENOUS at 17:08

## 2021-11-20 RX ADMIN — TRAMADOL HYDROCHLORIDE 50 MILLIGRAM(S): 50 TABLET ORAL at 00:55

## 2021-11-20 RX ADMIN — ENOXAPARIN SODIUM 40 MILLIGRAM(S): 100 INJECTION SUBCUTANEOUS at 20:40

## 2021-11-20 RX ADMIN — Medication 975 MILLIGRAM(S): at 05:40

## 2021-11-20 RX ADMIN — Medication 975 MILLIGRAM(S): at 13:18

## 2021-11-20 RX ADMIN — Medication 975 MILLIGRAM(S): at 17:08

## 2021-11-20 RX ADMIN — Medication 975 MILLIGRAM(S): at 12:06

## 2021-11-20 RX ADMIN — TRAMADOL HYDROCHLORIDE 50 MILLIGRAM(S): 50 TABLET ORAL at 18:00

## 2021-11-21 LAB
ANION GAP SERPL CALC-SCNC: 15 MMOL/L — SIGNIFICANT CHANGE UP (ref 5–17)
BASOPHILS # BLD AUTO: 0.05 K/UL — SIGNIFICANT CHANGE UP (ref 0–0.2)
BASOPHILS NFR BLD AUTO: 0.6 % — SIGNIFICANT CHANGE UP (ref 0–2)
BUN SERPL-MCNC: 8.9 MG/DL — SIGNIFICANT CHANGE UP (ref 8–20)
CALCIUM SERPL-MCNC: 8.5 MG/DL — LOW (ref 8.6–10.2)
CHLORIDE SERPL-SCNC: 100 MMOL/L — SIGNIFICANT CHANGE UP (ref 98–107)
CO2 SERPL-SCNC: 22 MMOL/L — SIGNIFICANT CHANGE UP (ref 22–29)
CREAT SERPL-MCNC: 0.68 MG/DL — SIGNIFICANT CHANGE UP (ref 0.5–1.3)
EOSINOPHIL # BLD AUTO: 0.16 K/UL — SIGNIFICANT CHANGE UP (ref 0–0.5)
EOSINOPHIL NFR BLD AUTO: 1.9 % — SIGNIFICANT CHANGE UP (ref 0–6)
GLUCOSE SERPL-MCNC: 105 MG/DL — HIGH (ref 70–99)
HCT VFR BLD CALC: 30.8 % — LOW (ref 34.5–45)
HGB BLD-MCNC: 9.9 G/DL — LOW (ref 11.5–15.5)
IMM GRANULOCYTES NFR BLD AUTO: 1 % — SIGNIFICANT CHANGE UP (ref 0–1.5)
LYMPHOCYTES # BLD AUTO: 3.08 K/UL — SIGNIFICANT CHANGE UP (ref 1–3.3)
LYMPHOCYTES # BLD AUTO: 36.8 % — SIGNIFICANT CHANGE UP (ref 13–44)
MAGNESIUM SERPL-MCNC: 1.9 MG/DL — SIGNIFICANT CHANGE UP (ref 1.6–2.6)
MCHC RBC-ENTMCNC: 29.3 PG — SIGNIFICANT CHANGE UP (ref 27–34)
MCHC RBC-ENTMCNC: 32.1 GM/DL — SIGNIFICANT CHANGE UP (ref 32–36)
MCV RBC AUTO: 91.1 FL — SIGNIFICANT CHANGE UP (ref 80–100)
MONOCYTES # BLD AUTO: 0.57 K/UL — SIGNIFICANT CHANGE UP (ref 0–0.9)
MONOCYTES NFR BLD AUTO: 6.8 % — SIGNIFICANT CHANGE UP (ref 2–14)
NEUTROPHILS # BLD AUTO: 4.43 K/UL — SIGNIFICANT CHANGE UP (ref 1.8–7.4)
NEUTROPHILS NFR BLD AUTO: 52.9 % — SIGNIFICANT CHANGE UP (ref 43–77)
PHOSPHATE SERPL-MCNC: 3.2 MG/DL — SIGNIFICANT CHANGE UP (ref 2.4–4.7)
PLATELET # BLD AUTO: 371 K/UL — SIGNIFICANT CHANGE UP (ref 150–400)
POTASSIUM SERPL-MCNC: 3.4 MMOL/L — LOW (ref 3.5–5.3)
POTASSIUM SERPL-SCNC: 3.4 MMOL/L — LOW (ref 3.5–5.3)
RBC # BLD: 3.38 M/UL — LOW (ref 3.8–5.2)
RBC # FLD: 12.7 % — SIGNIFICANT CHANGE UP (ref 10.3–14.5)
SARS-COV-2 RNA SPEC QL NAA+PROBE: SIGNIFICANT CHANGE UP
SODIUM SERPL-SCNC: 137 MMOL/L — SIGNIFICANT CHANGE UP (ref 135–145)
WBC # BLD: 8.37 K/UL — SIGNIFICANT CHANGE UP (ref 3.8–10.5)
WBC # FLD AUTO: 8.37 K/UL — SIGNIFICANT CHANGE UP (ref 3.8–10.5)

## 2021-11-21 PROCEDURE — 99233 SBSQ HOSP IP/OBS HIGH 50: CPT

## 2021-11-21 PROCEDURE — 99024 POSTOP FOLLOW-UP VISIT: CPT | Mod: GC

## 2021-11-21 RX ORDER — INDOMETHACIN 50 MG
100 CAPSULE ORAL ONCE
Refills: 0 | Status: DISCONTINUED | OUTPATIENT
Start: 2021-11-22 | End: 2021-11-23

## 2021-11-21 RX ORDER — SIMETHICONE 80 MG/1
80 TABLET, CHEWABLE ORAL EVERY 6 HOURS
Refills: 0 | Status: DISCONTINUED | OUTPATIENT
Start: 2021-11-21 | End: 2021-11-23

## 2021-11-21 RX ORDER — PIPERACILLIN AND TAZOBACTAM 4; .5 G/20ML; G/20ML
3.38 INJECTION, POWDER, LYOPHILIZED, FOR SOLUTION INTRAVENOUS ONCE
Refills: 0 | Status: COMPLETED | OUTPATIENT
Start: 2021-11-22 | End: 2021-11-23

## 2021-11-21 RX ORDER — POTASSIUM CHLORIDE 20 MEQ
20 PACKET (EA) ORAL
Refills: 0 | Status: COMPLETED | OUTPATIENT
Start: 2021-11-21 | End: 2021-11-21

## 2021-11-21 RX ADMIN — SIMETHICONE 80 MILLIGRAM(S): 80 TABLET, CHEWABLE ORAL at 22:17

## 2021-11-21 RX ADMIN — TRAMADOL HYDROCHLORIDE 50 MILLIGRAM(S): 50 TABLET ORAL at 13:00

## 2021-11-21 RX ADMIN — TRAMADOL HYDROCHLORIDE 50 MILLIGRAM(S): 50 TABLET ORAL at 20:06

## 2021-11-21 RX ADMIN — Medication 975 MILLIGRAM(S): at 11:16

## 2021-11-21 RX ADMIN — TRAMADOL HYDROCHLORIDE 50 MILLIGRAM(S): 50 TABLET ORAL at 00:15

## 2021-11-21 RX ADMIN — Medication 20 MILLIEQUIVALENT(S): at 14:14

## 2021-11-21 RX ADMIN — Medication 975 MILLIGRAM(S): at 12:00

## 2021-11-21 RX ADMIN — Medication 1 TABLET(S): at 11:17

## 2021-11-21 RX ADMIN — FAMOTIDINE 20 MILLIGRAM(S): 10 INJECTION INTRAVENOUS at 06:04

## 2021-11-21 RX ADMIN — TRAMADOL HYDROCHLORIDE 50 MILLIGRAM(S): 50 TABLET ORAL at 06:30

## 2021-11-21 RX ADMIN — Medication 20 MILLIEQUIVALENT(S): at 11:16

## 2021-11-21 RX ADMIN — Medication 88 MICROGRAM(S): at 06:04

## 2021-11-21 RX ADMIN — Medication 975 MILLIGRAM(S): at 06:05

## 2021-11-21 RX ADMIN — Medication 975 MILLIGRAM(S): at 23:00

## 2021-11-21 RX ADMIN — TRAMADOL HYDROCHLORIDE 50 MILLIGRAM(S): 50 TABLET ORAL at 21:06

## 2021-11-21 RX ADMIN — TRAMADOL HYDROCHLORIDE 50 MILLIGRAM(S): 50 TABLET ORAL at 12:10

## 2021-11-21 RX ADMIN — Medication 975 MILLIGRAM(S): at 18:03

## 2021-11-21 RX ADMIN — TRAMADOL HYDROCHLORIDE 50 MILLIGRAM(S): 50 TABLET ORAL at 06:04

## 2021-11-21 RX ADMIN — FAMOTIDINE 20 MILLIGRAM(S): 10 INJECTION INTRAVENOUS at 17:18

## 2021-11-21 RX ADMIN — Medication 975 MILLIGRAM(S): at 00:15

## 2021-11-21 RX ADMIN — Medication 975 MILLIGRAM(S): at 17:18

## 2021-11-21 RX ADMIN — Medication 975 MILLIGRAM(S): at 22:16

## 2021-11-21 RX ADMIN — Medication 975 MILLIGRAM(S): at 06:30

## 2021-11-21 NOTE — PROGRESS NOTE ADULT - TIME BILLING
I reviewed the IOC images, labs, notes   risks and benefits of ERCP explained
reviewed IOC images, labs, notes. Discussed plan with Dr Bob

## 2021-11-22 ENCOUNTER — TRANSCRIPTION ENCOUNTER (OUTPATIENT)
Age: 31
End: 2021-11-22

## 2021-11-22 LAB
ALBUMIN SERPL ELPH-MCNC: 3.5 G/DL — SIGNIFICANT CHANGE UP (ref 3.3–5.2)
ALP SERPL-CCNC: 73 U/L — SIGNIFICANT CHANGE UP (ref 40–120)
ALT FLD-CCNC: 31 U/L — SIGNIFICANT CHANGE UP
ANION GAP SERPL CALC-SCNC: 14 MMOL/L — SIGNIFICANT CHANGE UP (ref 5–17)
AST SERPL-CCNC: 22 U/L — SIGNIFICANT CHANGE UP
BASOPHILS # BLD AUTO: 0.04 K/UL — SIGNIFICANT CHANGE UP (ref 0–0.2)
BASOPHILS NFR BLD AUTO: 0.6 % — SIGNIFICANT CHANGE UP (ref 0–2)
BILIRUB SERPL-MCNC: <0.2 MG/DL — LOW (ref 0.4–2)
BLD GP AB SCN SERPL QL: SIGNIFICANT CHANGE UP
BUN SERPL-MCNC: 6.8 MG/DL — LOW (ref 8–20)
CALCIUM SERPL-MCNC: 8.8 MG/DL — SIGNIFICANT CHANGE UP (ref 8.6–10.2)
CHLORIDE SERPL-SCNC: 102 MMOL/L — SIGNIFICANT CHANGE UP (ref 98–107)
CO2 SERPL-SCNC: 23 MMOL/L — SIGNIFICANT CHANGE UP (ref 22–29)
CREAT SERPL-MCNC: 0.6 MG/DL — SIGNIFICANT CHANGE UP (ref 0.5–1.3)
EOSINOPHIL # BLD AUTO: 0.21 K/UL — SIGNIFICANT CHANGE UP (ref 0–0.5)
EOSINOPHIL NFR BLD AUTO: 3.3 % — SIGNIFICANT CHANGE UP (ref 0–6)
GLUCOSE SERPL-MCNC: 87 MG/DL — SIGNIFICANT CHANGE UP (ref 70–99)
HCT VFR BLD CALC: 32.5 % — LOW (ref 34.5–45)
HGB BLD-MCNC: 10.7 G/DL — LOW (ref 11.5–15.5)
IMM GRANULOCYTES NFR BLD AUTO: 1.1 % — SIGNIFICANT CHANGE UP (ref 0–1.5)
LYMPHOCYTES # BLD AUTO: 3.08 K/UL — SIGNIFICANT CHANGE UP (ref 1–3.3)
LYMPHOCYTES # BLD AUTO: 48.3 % — HIGH (ref 13–44)
MAGNESIUM SERPL-MCNC: 1.9 MG/DL — SIGNIFICANT CHANGE UP (ref 1.6–2.6)
MCHC RBC-ENTMCNC: 29.5 PG — SIGNIFICANT CHANGE UP (ref 27–34)
MCHC RBC-ENTMCNC: 32.9 GM/DL — SIGNIFICANT CHANGE UP (ref 32–36)
MCV RBC AUTO: 89.5 FL — SIGNIFICANT CHANGE UP (ref 80–100)
MONOCYTES # BLD AUTO: 0.38 K/UL — SIGNIFICANT CHANGE UP (ref 0–0.9)
MONOCYTES NFR BLD AUTO: 6 % — SIGNIFICANT CHANGE UP (ref 2–14)
NEUTROPHILS # BLD AUTO: 2.6 K/UL — SIGNIFICANT CHANGE UP (ref 1.8–7.4)
NEUTROPHILS NFR BLD AUTO: 40.7 % — LOW (ref 43–77)
PLATELET # BLD AUTO: 392 K/UL — SIGNIFICANT CHANGE UP (ref 150–400)
POTASSIUM SERPL-MCNC: 4 MMOL/L — SIGNIFICANT CHANGE UP (ref 3.5–5.3)
POTASSIUM SERPL-SCNC: 4 MMOL/L — SIGNIFICANT CHANGE UP (ref 3.5–5.3)
PROT SERPL-MCNC: 6.8 G/DL — SIGNIFICANT CHANGE UP (ref 6.6–8.7)
RBC # BLD: 3.63 M/UL — LOW (ref 3.8–5.2)
RBC # FLD: 12.5 % — SIGNIFICANT CHANGE UP (ref 10.3–14.5)
SODIUM SERPL-SCNC: 139 MMOL/L — SIGNIFICANT CHANGE UP (ref 135–145)
WBC # BLD: 6.38 K/UL — SIGNIFICANT CHANGE UP (ref 3.8–10.5)
WBC # FLD AUTO: 6.38 K/UL — SIGNIFICANT CHANGE UP (ref 3.8–10.5)

## 2021-11-22 PROCEDURE — 43274 ERCP DUCT STENT PLACEMENT: CPT | Mod: 59

## 2021-11-22 PROCEDURE — 43264 ERCP REMOVE DUCT CALCULI: CPT

## 2021-11-22 RX ADMIN — Medication 975 MILLIGRAM(S): at 23:53

## 2021-11-22 RX ADMIN — Medication 975 MILLIGRAM(S): at 11:17

## 2021-11-22 RX ADMIN — Medication 975 MILLIGRAM(S): at 05:49

## 2021-11-22 RX ADMIN — ONDANSETRON 4 MILLIGRAM(S): 8 TABLET, FILM COATED ORAL at 10:56

## 2021-11-22 RX ADMIN — FAMOTIDINE 20 MILLIGRAM(S): 10 INJECTION INTRAVENOUS at 05:08

## 2021-11-22 RX ADMIN — Medication 975 MILLIGRAM(S): at 05:08

## 2021-11-22 RX ADMIN — Medication 88 MICROGRAM(S): at 05:08

## 2021-11-22 RX ADMIN — Medication 1 TABLET(S): at 10:59

## 2021-11-22 RX ADMIN — SODIUM CHLORIDE 75 MILLILITER(S): 9 INJECTION, SOLUTION INTRAVENOUS at 07:00

## 2021-11-22 RX ADMIN — Medication 975 MILLIGRAM(S): at 10:58

## 2021-11-23 ENCOUNTER — TRANSCRIPTION ENCOUNTER (OUTPATIENT)
Age: 31
End: 2021-11-23

## 2021-11-23 VITALS
SYSTOLIC BLOOD PRESSURE: 136 MMHG | OXYGEN SATURATION: 97 % | TEMPERATURE: 99 F | RESPIRATION RATE: 18 BRPM | DIASTOLIC BLOOD PRESSURE: 86 MMHG | HEART RATE: 87 BPM

## 2021-11-23 LAB
ANION GAP SERPL CALC-SCNC: 16 MMOL/L — SIGNIFICANT CHANGE UP (ref 5–17)
BASOPHILS # BLD AUTO: 0.02 K/UL — SIGNIFICANT CHANGE UP (ref 0–0.2)
BASOPHILS NFR BLD AUTO: 0.2 % — SIGNIFICANT CHANGE UP (ref 0–2)
BUN SERPL-MCNC: 10.8 MG/DL — SIGNIFICANT CHANGE UP (ref 8–20)
CALCIUM SERPL-MCNC: 8.9 MG/DL — SIGNIFICANT CHANGE UP (ref 8.6–10.2)
CHLORIDE SERPL-SCNC: 103 MMOL/L — SIGNIFICANT CHANGE UP (ref 98–107)
CO2 SERPL-SCNC: 19 MMOL/L — LOW (ref 22–29)
CREAT SERPL-MCNC: 0.6 MG/DL — SIGNIFICANT CHANGE UP (ref 0.5–1.3)
EOSINOPHIL # BLD AUTO: 0.01 K/UL — SIGNIFICANT CHANGE UP (ref 0–0.5)
EOSINOPHIL NFR BLD AUTO: 0.1 % — SIGNIFICANT CHANGE UP (ref 0–6)
GLUCOSE SERPL-MCNC: 150 MG/DL — HIGH (ref 70–99)
HCT VFR BLD CALC: 31.6 % — LOW (ref 34.5–45)
HGB BLD-MCNC: 10.4 G/DL — LOW (ref 11.5–15.5)
IMM GRANULOCYTES NFR BLD AUTO: 0.7 % — SIGNIFICANT CHANGE UP (ref 0–1.5)
LYMPHOCYTES # BLD AUTO: 1.51 K/UL — SIGNIFICANT CHANGE UP (ref 1–3.3)
LYMPHOCYTES # BLD AUTO: 15.3 % — SIGNIFICANT CHANGE UP (ref 13–44)
MAGNESIUM SERPL-MCNC: 1.9 MG/DL — SIGNIFICANT CHANGE UP (ref 1.6–2.6)
MCHC RBC-ENTMCNC: 29.5 PG — SIGNIFICANT CHANGE UP (ref 27–34)
MCHC RBC-ENTMCNC: 32.9 GM/DL — SIGNIFICANT CHANGE UP (ref 32–36)
MCV RBC AUTO: 89.5 FL — SIGNIFICANT CHANGE UP (ref 80–100)
MONOCYTES # BLD AUTO: 0.48 K/UL — SIGNIFICANT CHANGE UP (ref 0–0.9)
MONOCYTES NFR BLD AUTO: 4.9 % — SIGNIFICANT CHANGE UP (ref 2–14)
NEUTROPHILS # BLD AUTO: 7.79 K/UL — HIGH (ref 1.8–7.4)
NEUTROPHILS NFR BLD AUTO: 78.8 % — HIGH (ref 43–77)
PHOSPHATE SERPL-MCNC: 4.5 MG/DL — SIGNIFICANT CHANGE UP (ref 2.4–4.7)
PLATELET # BLD AUTO: 426 K/UL — HIGH (ref 150–400)
POTASSIUM SERPL-MCNC: 4.3 MMOL/L — SIGNIFICANT CHANGE UP (ref 3.5–5.3)
POTASSIUM SERPL-SCNC: 4.3 MMOL/L — SIGNIFICANT CHANGE UP (ref 3.5–5.3)
RBC # BLD: 3.53 M/UL — LOW (ref 3.8–5.2)
RBC # FLD: 12.5 % — SIGNIFICANT CHANGE UP (ref 10.3–14.5)
SODIUM SERPL-SCNC: 138 MMOL/L — SIGNIFICANT CHANGE UP (ref 135–145)
WBC # BLD: 9.88 K/UL — SIGNIFICANT CHANGE UP (ref 3.8–10.5)
WBC # FLD AUTO: 9.88 K/UL — SIGNIFICANT CHANGE UP (ref 3.8–10.5)

## 2021-11-23 PROCEDURE — 74330 X-RAY BILE/PANC ENDOSCOPY: CPT

## 2021-11-23 PROCEDURE — 85730 THROMBOPLASTIN TIME PARTIAL: CPT

## 2021-11-23 PROCEDURE — 80053 COMPREHEN METABOLIC PANEL: CPT

## 2021-11-23 PROCEDURE — 86850 RBC ANTIBODY SCREEN: CPT

## 2021-11-23 PROCEDURE — 99233 SBSQ HOSP IP/OBS HIGH 50: CPT

## 2021-11-23 PROCEDURE — 83735 ASSAY OF MAGNESIUM: CPT

## 2021-11-23 PROCEDURE — C1769: CPT

## 2021-11-23 PROCEDURE — 86900 BLOOD TYPING SEROLOGIC ABO: CPT

## 2021-11-23 PROCEDURE — C2617: CPT

## 2021-11-23 PROCEDURE — 88304 TISSUE EXAM BY PATHOLOGIST: CPT

## 2021-11-23 PROCEDURE — C9399: CPT

## 2021-11-23 PROCEDURE — U0003: CPT

## 2021-11-23 PROCEDURE — 85025 COMPLETE CBC W/AUTO DIFF WBC: CPT

## 2021-11-23 PROCEDURE — C1887: CPT

## 2021-11-23 PROCEDURE — 86769 SARS-COV-2 COVID-19 ANTIBODY: CPT

## 2021-11-23 PROCEDURE — 85610 PROTHROMBIN TIME: CPT

## 2021-11-23 PROCEDURE — 36415 COLL VENOUS BLD VENIPUNCTURE: CPT

## 2021-11-23 PROCEDURE — 82962 GLUCOSE BLOOD TEST: CPT

## 2021-11-23 PROCEDURE — U0005: CPT

## 2021-11-23 PROCEDURE — C1889: CPT

## 2021-11-23 PROCEDURE — 80076 HEPATIC FUNCTION PANEL: CPT

## 2021-11-23 PROCEDURE — 74018 RADEX ABDOMEN 1 VIEW: CPT

## 2021-11-23 PROCEDURE — 80048 BASIC METABOLIC PNL TOTAL CA: CPT

## 2021-11-23 PROCEDURE — 86901 BLOOD TYPING SEROLOGIC RH(D): CPT

## 2021-11-23 PROCEDURE — 84100 ASSAY OF PHOSPHORUS: CPT

## 2021-11-23 PROCEDURE — C1773: CPT

## 2021-11-23 RX ORDER — IBUPROFEN 200 MG
1 TABLET ORAL
Qty: 0 | Refills: 0 | DISCHARGE

## 2021-11-23 RX ADMIN — Medication 975 MILLIGRAM(S): at 12:50

## 2021-11-23 RX ADMIN — Medication 975 MILLIGRAM(S): at 00:53

## 2021-11-23 RX ADMIN — Medication 975 MILLIGRAM(S): at 11:53

## 2021-11-23 RX ADMIN — Medication 975 MILLIGRAM(S): at 06:46

## 2021-11-23 RX ADMIN — FAMOTIDINE 20 MILLIGRAM(S): 10 INJECTION INTRAVENOUS at 05:46

## 2021-11-23 RX ADMIN — Medication 88 MICROGRAM(S): at 05:46

## 2021-11-23 RX ADMIN — PIPERACILLIN AND TAZOBACTAM 200 GRAM(S): 4; .5 INJECTION, POWDER, LYOPHILIZED, FOR SOLUTION INTRAVENOUS at 11:56

## 2021-11-23 RX ADMIN — Medication 975 MILLIGRAM(S): at 05:46

## 2021-11-23 RX ADMIN — Medication 1 TABLET(S): at 11:53

## 2021-11-23 NOTE — DISCHARGE NOTE PROVIDER - HOSPITAL COURSE
Patient was admitted to the hospital for Gallstone pancreatitis w/ leukocytosis. Patient was taken to the OR on 11/19 and underwent a laparoscopic cholecystectomy w/ IOC. There was a filling defect on intra-op IOC and GI was consulted. Patient underwent ERCP on 11/22 and a biliary sphincterectomy was performed and stone/sludge removed. Pancreatic duct stent was placed. Leukocytosis continued to trend down to normal throughout hospital stay. Diet was advanced and well tolerated. Voiding spontaneously. Pain was well controlled at time of discharge. Patient was stable for discharge home.

## 2021-11-23 NOTE — PROGRESS NOTE ADULT - ASSESSMENT
31F admit with gallstone pancreatitis and choledocholithiasis    - await ERCP early next week  - CLD  - pain control  - dvt ppx  - monitor LFT's  
31F admit with gallstone pancreatitis and choledocholithiasis    - IOC identified distal CBD stone, unable to remove  - GI consulted, ERCP early next week  - pain control  - dvt ppx  - monitor LFT's
Patient is a 30 y/o F s/p lap milton pod#4 with retained stone in cbd now pod#1 of ercp   plan:  regular diet   pain control  IV hydration likely IV lock today   on IV abx ? end date, are we sending home on PO abx  serial abdominal exams  trend labs  incentive spirometer  dispo discharge possibly today    
Patient is a 28 y/o F s/p lap milton pod#3 with retained stone in cbd going with GI today for ERCP  plan:  NPO  pain control  IV hydration   serial abdominal exams  trend labs  incentive spirometer  ERCP today with GI

## 2021-11-23 NOTE — PROGRESS NOTE ADULT - PROBLEM SELECTOR PLAN 1
IOC- films showed  cbd stone
POD#4 of lap-milton with IOC, s/p ERCP with biliary sphincterotomy, stone/sludge removal, an pancreatic duct stent placement,  and now normalization of LFT's.  Advance diet as tolerated   Abdominal  X ray in 1 week to confirm PD stent migration.  Patient needs to follow up as outpatient with Dr Edmonds for possible stent removal in the next 4 to 6 weeks.
NO pain  monitor LFT  ERCP on monday - risk and benefits explained and pt given oportunity to answer questions

## 2021-11-23 NOTE — DISCHARGE NOTE PROVIDER - CARE PROVIDER_API CALL
Yaya Edmonds)  Gastroenterology; Internal Medicine  [address] [place] [address]  Phone: [phone]  Fax: [phone]  Follow Up Time:

## 2021-11-23 NOTE — PROGRESS NOTE ADULT - ATTENDING COMMENTS
31-year-old female status post laparoscopic cholecystectomy for gallstone pancreatitis.  Intraoperative cholangiogram shown retained stones and on 11/22 had ERCP with CBD clearance  Awake, alert, oriented x3, no acute distress, feels better  PUL CTA  CV RRR  GI Appropriate incisional tenderness, incisions are closed  Plan  1.  Diet as tolerated  2.  Stable for D/C  3.  F/U in clinic in 7-10 days    Code 35136 .
31-year-old female status post laparoscopic cholecystectomy for gallstone pancreatitis.  Intraoperative cholangiogram shown retained stones.  Awake, alert, oriented x3, no acute distress.  Plan  1.  Patient for ERCP today    Code 07049
Agree with above assessment.  The patient is stable without new events overnight. The patient has normal LFT's, GI follow up for possible ERCP.   Surgically stable.
Agree with above assessment.  The patient was seen and examined by me. The patient is with no abdominal pain, nausea, or vomit. Abdomen is soft, non tender, incision sites are without infection.  The patient is for ERCP tomorrow with GI. Surgically stable.
The patient seen and examined.  Has no abdominal pain.  Status post ERCP. Pancreatic duct stent placement.  Doing well. Abdominal X ray in 1 week to confirm PD stent migration. Can be discharged from GI perspective.

## 2021-11-23 NOTE — DISCHARGE NOTE PROVIDER - NSDCMRMEDTOKEN_GEN_ALL_CORE_FT
acetaminophen 325 mg oral tablet: 3 tab(s) orally   ibuprofen 600 mg oral tablet: 1 tab(s) orally every 6 hours  levothyroxine 88 mcg (0.088 mg) oral tablet: 1 tab(s) orally once a day  Pepcid 20 mg oral tablet: 1 tab(s) orally 2 times a day

## 2021-11-23 NOTE — DISCHARGE NOTE NURSING/CASE MANAGEMENT/SOCIAL WORK - PATIENT PORTAL LINK FT
You can access the FollowMyHealth Patient Portal offered by [facility] by registering at the following website: [email or web address]. By joining [facility]’s FollowMyHealth portal, you will also be able to view your health information using other applications (apps) compatible with our system.

## 2021-11-23 NOTE — PROGRESS NOTE ADULT - SUBJECTIVE AND OBJECTIVE BOX
Patient is a 31y old  Female who presents with a chief complaint of Gallstone pancreatitis, choledocholithiasis (2021 02:17)      HPI: patietn with GS pancreatitis last month while pregnant. Pt is post partum.  Had lap milton yesterday. IOC showed CBD stones. transaminases mildly elevated. No pain post op, no nausea, no vomiting       REVIEW OF SYSTEMS:  Constitutional: No fever, weight loss or fatigue  ENMT:  No difficulty hearing, tinnitus, vertigo; No sinus or throat pain  Respiratory: No cough, wheezing, chills or hemoptysis  Cardiovascular: No chest pain, palpitations, dizziness or leg swelling  Gastrointestinal: No abdominal or epigastric pain. No nausea, vomiting or hematemesis; No diarrhea or constipation. No melena or hematochezia.  Skin: No itching, burning, rashes or lesions   Musculoskeletal: No joint pain or swelling; No muscle, back or extremity pain    PAST MEDICAL & SURGICAL HISTORY:  Hypothyroid    Gestational diabetes    Right upper quadrant pain    Gallbladder stone without cholecystitis or obstruction     1, currently pregnant    H/O ovarian cystectomy    [place] teeth removed        FAMILY HISTORY:  FH: breast cancer (Aunt)    FHx: diabetes mellitus (Mother)    FH: colon cancer (Uncle)        SOCIAL HISTORY:  Smoking Status: [ ] Current, [ ] Former, [ ] Never  Pack Years:  [  ] EtOH-no  [  ] IVDA    MEDICATIONS:  MEDICATIONS  (STANDING):  acetaminophen     Tablet .. 975 milliGRAM(s) Oral every 6 hours  enoxaparin Injectable 40 milliGRAM(s) SubCutaneous at bedtime  famotidine    Tablet 20 milliGRAM(s) Oral two times a day  lactated ringers. 1000 milliLiter(s) (75 mL/Hr) IV Continuous <Continuous>  levothyroxine 88 MICROGram(s) Oral daily  multivitamin 1 Tablet(s) Oral daily    MEDICATIONS  (PRN):  ondansetron Injectable 4 milliGRAM(s) IV Push every 6 hours PRN Nausea  traMADol 25 milliGRAM(s) Oral every 6 hours PRN Moderate Pain (4 - 6)  traMADol 50 milliGRAM(s) Oral every 6 hours PRN Severe Pain (7 - 10)      Allergies    chloroseptic spray (Short breath)  No Known Drug Allergies    Intolerances        Vital Signs Last 24 Hrs  T(C): 36.7 (2021 12:12), Max: 37 (2021 17:01)  T(F): 98 (2021 12:12), Max: 98.6 (2021 17:01)  HR: 77 (2021 12:12) (70 - 98)  BP: 138/96 (2021 12:12) (118/68 - 138/96)  BP(mean): 93 (2021 15:30) (89 - 93)  RR: 18 (2021 12:12) (15 - 20)  SpO2: 94% (2021 12:12) (94% - 100%)     @ 07:01  -   @ 07:00  --------------------------------------------------------  IN: 1140 mL / OUT: 1600 mL / NET: -460 mL          PHYSICAL EXAM:    General: Well developed; well nourished; in no acute distress  HEENT: MMM, conjunctiva and sclera clear  H- RRR  L- CTA   Gastrointestinal: Soft, non-tender non-distended; Normal bowel sounds; No rebound or guarding  Extremities: Normal range of motion, No clubbing, cyanosis or edema  Neurological: Alert and oriented x3  Skin: Warm and dry. No obvious rash      LABS:                        9.2    12.34 )-----------( 384      ( 2021 06:04 )             27.8     2021 06:04    136    |  102    |  8.4    ----------------------------<  106    3.9     |  20.0   |  0.55     Ca    8.5        2021 06:04  Phos  3.6       2021 06:04  Mg     1.9       2021 06:04    TPro  6.2    /  Alb  3.2    /  TBili  0.2    /  DBili  0.1    /  AST  39     /  ALT  39     /  AlkPhos  78     / Amylase x      /Lipase x      2021 06:04              RADIOLOGY & ADDITIONAL STUDIES:     < from: US Pancreas (10..21 @ 03:35) >  IMPRESSION: Gallstone filled gallbladder. No evidence of biliary dilatation. Unremarkable sonographic appearance of the visualized pancreatic head and neck.      < end of copied text >  
Patient is a 31y old  Female who presents with a chief complaint of gallstone pancreatitis (2021 03:10)      HPI:The patient states she had some mild upper epigastric pain today. No nausea no vomiting no fever. No constipation no diarrhea. No LFTs have been drawn this morning. Transaminases were mildly elevated yesterday        REVIEW OF SYSTEMS:  Constitutional: No fever, weight loss or fatigue  ENMT:  No difficulty hearing, tinnitus, vertigo; No sinus or throat pain  Respiratory: No cough, wheezing, chills or hemoptysis  Cardiovascular: No chest pain, palpitations, dizziness or leg swelling  Gastrointestinal: + epigastric pain. No nausea, vomiting or hematemesis; No diarrhea or constipation. No melena or hematochezia.  Skin: No itching, burning, rashes or lesions   Musculoskeletal: No joint pain or swelling; No muscle, back or extremity pain    PAST MEDICAL & SURGICAL HISTORY:  Hypothyroid    Gestational diabetes    Right upper quadrant pain    Gallbladder stone without cholecystitis or obstruction     1, currently pregnant    H/O ovarian cystectomy    [place] teeth removed        FAMILY HISTORY:  FH: breast cancer (Aunt)    FHx: diabetes mellitus (Mother)    FH: colon cancer (Uncle)        SOCIAL HISTORY:  Smoking Status: [ ] Current, [ ] Former, [ ] Never  Pack Years:  [  ] EtOH-no  [  ] IVDA    MEDICATIONS:  MEDICATIONS  (STANDING):  acetaminophen     Tablet .. 975 milliGRAM(s) Oral every 6 hours  enoxaparin Injectable 40 milliGRAM(s) SubCutaneous at bedtime  famotidine    Tablet 20 milliGRAM(s) Oral two times a day  lactated ringers. 1000 milliLiter(s) (75 mL/Hr) IV Continuous <Continuous>  levothyroxine 88 MICROGram(s) Oral daily  multivitamin 1 Tablet(s) Oral daily  potassium chloride    Tablet ER 20 milliEquivalent(s) Oral every 2 hours    MEDICATIONS  (PRN):  ondansetron Injectable 4 milliGRAM(s) IV Push every 6 hours PRN Nausea  traMADol 25 milliGRAM(s) Oral every 6 hours PRN Moderate Pain (4 - 6)  traMADol 50 milliGRAM(s) Oral every 6 hours PRN Severe Pain (7 - 10)      Allergies    chloroseptic spray (Short breath)  No Known Drug Allergies    Intolerances        Vital Signs Last 24 Hrs  T(C): 36.7 (2021 04:06), Max: 36.7 (2021 12:12)  T(F): 98.1 (2021 04:06), Max: 98.1 (2021 04:06)  HR: 66 (2021 04:06) (66 - 77)  BP: 121/84 (2021 04:06) (114/79 - 138/96)  BP(mean): --  RR: 18 (2021 04:06) (17 - 18)  SpO2: 98% (2021 04:06) (94% - 98%)     @ 07:01  -   @ 07:00  --------------------------------------------------------  IN: 3600 mL / OUT: 2000 mL / NET: 1600 mL          PHYSICAL EXAM:    General: Well developed; well nourished; in no acute distress  HEENT: MMM, conjunctiva and sclera clear  H- RRR  L- CTA   Gastrointestinal: Soft, non-tender non-distended; Normal bowel sounds; No rebound or guarding  Extremities: Normal range of motion, No clubbing, cyanosis or edema  Neurological: Alert and oriented x3  Skin: Warm and dry. No obvious rash      LABS:                        9.9    8.37  )-----------( 371      ( 2021 06:43 )             30.8     2021 06:43    137    |  100    |  8.9    ----------------------------<  105    3.4     |  22.0   |  0.68     Ca    8.5        2021 06:43  Phos  3.2       2021 06:43  Mg     1.9       2021 06:43    TPro  6.2    /  Alb  3.2    /  TBili  0.2    /  DBili  0.1    /  AST  39     /  ALT  39     /  AlkPhos  78     / Amylase x      /Lipase x      2021 06:04              RADIOLOGY & ADDITIONAL STUDIES:     
Patient is a 31y old  Female who presents with a chief complaint of gastroenterolgy (21 Nov 2021 09:42)      INTERVAL HPI/OVERNIGHT EVENTS: Patient seen and evaluated at bedside, reporting no complaints, no overnight events, tolerating oral intake, POD#4 of lap-milton with IOC, s/p ERCP with biliary sphincterotomy, stone/sludge removal, an pancreatic duct stent placement,  and now normalization of LFT's. Denies nausea, vomiting, abdominal pain, chest pain, shortness of breath, hematemesis, hematochezia, melena.      MEDICATIONS  (STANDING):  acetaminophen     Tablet .. 975 milliGRAM(s) Oral every 6 hours  famotidine    Tablet 20 milliGRAM(s) Oral two times a day  indomethacin Suppository 100 milliGRAM(s) Rectal once  lactated ringers. 1000 milliLiter(s) (75 mL/Hr) IV Continuous <Continuous>  levothyroxine 88 MICROGram(s) Oral daily  multivitamin 1 Tablet(s) Oral daily  piperacillin/tazobactam IVPB. 3.375 Gram(s) IV Intermittent once    MEDICATIONS  (PRN):  ondansetron Injectable 4 milliGRAM(s) IV Push every 6 hours PRN Nausea  simethicone 80 milliGRAM(s) Chew every 6 hours PRN Gas  traMADol 25 milliGRAM(s) Oral every 6 hours PRN Moderate Pain (4 - 6)  traMADol 50 milliGRAM(s) Oral every 6 hours PRN Severe Pain (7 - 10)      Allergies    chloroseptic spray (Short breath)  No Known Drug Allergies    Intolerances    Review of Systems:  · ENMT: negative  · Respiratory and Thorax: negative  · Cardiovascular: negative  · Gastrointestinal: see above.  · Genitourinary:	negative  · Musculoskeletal: negative  · Neurological: negative  · Psychiatric: negative  · Hematology/Lymphatics: negative  · Endocrine: negative        Vital Signs Last 24 Hrs  T(C): 36.8 (23 Nov 2021 04:20), Max: 36.8 (22 Nov 2021 11:22)  T(F): 98.2 (23 Nov 2021 04:20), Max: 98.2 (22 Nov 2021 11:22)  HR: 77 (23 Nov 2021 04:20) (67 - 80)  BP: 137/85 (23 Nov 2021 04:20) (114/80 - 137/85)  BP(mean): --  RR: 18 (23 Nov 2021 04:20) (18 - 19)  SpO2: 97% (23 Nov 2021 04:20) (95% - 97%)    PHYSICAL EXAM:  · Constitutional: Young obese woman, in no acute distress.   · Eyes: EOMI; PERRL; no drainage or redness  · ENMT: No oral lesions; no gross abnormalities  · Neck:	No bruits; no thyromegaly or nodules  · Back:	No deformity or limitation of movement  · Respiratory: Breath Sounds equal & clear to percussion & auscultation, no accessory muscle use  · Cardiovascular: Regular rate & rhythm, normal S1, S2; no murmurs, gallops or rubs; no S3, S4  · Gastrointestinal: Soft, non-tender, no hepatosplenomegaly, normal bowel sounds. Lap-milton surgical incisions noted.       LABS:                        10.4   9.88  )-----------( 426      ( 23 Nov 2021 06:08 )             31.6     11-23    138  |  103  |  10.8  ----------------------------<  150<H>  4.3   |  19.0<L>  |  0.60    Ca    8.9      23 Nov 2021 06:08  Phos  4.5     11-23  Mg     1.9     11-23    TPro  6.8  /  Alb  3.5  /  TBili  <0.2<L>  /  DBili  x   /  AST  22  /  ALT  31  /  AlkPhos  73  11-22        LIVER FUNCTIONS - ( 22 Nov 2021 05:47 )  Alb: 3.5 g/dL / Pro: 6.8 g/dL / ALK PHOS: 73 U/L / ALT: 31 U/L / AST: 22 U/L / GGT: x         
SUBJECTIVE/24 hour events:   Patient is a 31yFemale presenting with acute cholecystitis now pod#4 of trung milton with IOC, unable to retrieve stone in CBD intra- opt and went  for ERCP  with GI on 11/22. Patient with no acute events overnight, pain controlled, tolerating regular diet, consider IV lock today, transamanitis  resolved. Patient with no requests or complaints at this time        Vital Signs Last 24 Hrs  T(C): 36.6 (22 Nov 2021 21:48), Max: 36.8 (22 Nov 2021 11:22)  T(F): 97.9 (22 Nov 2021 21:48), Max: 98.2 (22 Nov 2021 11:22)  HR: 80 (22 Nov 2021 21:48) (67 - 85)  BP: 119/78 (22 Nov 2021 21:48) (114/80 - 143/88)  BP(mean): --  RR: 19 (22 Nov 2021 21:48) (18 - 19)  SpO2: 95% (22 Nov 2021 21:48) (95% - 98%)  Drug Dosing Weight  Height (cm): 167.6 (19 Nov 2021 09:10)  Weight (kg): 116 (19 Nov 2021 09:10)  BMI (kg/m2): 41.3 (19 Nov 2021 09:10)  BSA (m2): 2.22 (19 Nov 2021 09:10)  I&O's Detail    21 Nov 2021 07:01  -  22 Nov 2021 07:00  --------------------------------------------------------  IN:    Lactated Ringers: 1800 mL    Oral Fluid: 1320 mL  Total IN: 3120 mL    OUT:    Voided (mL): 600 mL  Total OUT: 600 mL    Total NET: 2520 mL      22 Nov 2021 07:01  -  23 Nov 2021 04:10  --------------------------------------------------------  IN:    Lactated Ringers: 600 mL  Total IN: 600 mL    OUT:  Total OUT: 0 mL    Total NET: 600 mL        Allergies    chloroseptic spray (Short breath)  No Known Drug Allergies    Intolerances                              10.7   6.38  )-----------( 392      ( 22 Nov 2021 05:47 )             32.5   11-22    139  |  102  |  6.8<L>  ----------------------------<  87  4.0   |  23.0  |  0.60    Ca    8.8      22 Nov 2021 05:47  Phos  3.2     11-21  Mg     1.9     11-22    TPro  6.8  /  Alb  3.5  /  TBili  <0.2<L>  /  DBili  x   /  AST  22  /  ALT  31  /  AlkPhos  73  11-22      ROS:    PHYSICAL EXAM:  Constitutional: in good spirits  Respiratory: no respiratory distress, no dyspnea, no supplemental o2 needed   Gastrointestinal: abdomen softly distended, trochanter sites c/d/i, no guarding   Genitourinary: voiding spontaneously   Neurological: A&OX3      MEDICATIONS  (STANDING):  acetaminophen     Tablet .. 975 milliGRAM(s) Oral every 6 hours  famotidine    Tablet 20 milliGRAM(s) Oral two times a day  indomethacin Suppository 100 milliGRAM(s) Rectal once  lactated ringers. 1000 milliLiter(s) (75 mL/Hr) IV Continuous <Continuous>  levothyroxine 88 MICROGram(s) Oral daily  multivitamin 1 Tablet(s) Oral daily  piperacillin/tazobactam IVPB. 3.375 Gram(s) IV Intermittent once    MEDICATIONS  (PRN):  ondansetron Injectable 4 milliGRAM(s) IV Push every 6 hours PRN Nausea  simethicone 80 milliGRAM(s) Chew every 6 hours PRN Gas  traMADol 25 milliGRAM(s) Oral every 6 hours PRN Moderate Pain (4 - 6)  traMADol 50 milliGRAM(s) Oral every 6 hours PRN Severe Pain (7 - 10)      RADIOLOGY STUDIES:    CULTURES:        
SUBJECTIVE/24 hour events:  Patient is a 31yFemale presenting with acute cholecystitis now pod#3 of trung milton with IOC, unable to retrieve stone in CBD intraopt and going for ERCP today with GI. Patient with no acute events overnight, pain controlled, tolerating clears now NPO for procedure, continue IV hydration, trending labs. Patient with no requests or complaints at this time      Vital Signs Last 24 Hrs  T(C): 36.6 (21 Nov 2021 21:26), Max: 36.9 (21 Nov 2021 17:02)  T(F): 97.9 (21 Nov 2021 21:26), Max: 98.4 (21 Nov 2021 17:02)  HR: 66 (21 Nov 2021 21:26) (66 - 86)  BP: 119/80 (21 Nov 2021 21:26) (112/83 - 122/81)  BP(mean): --  RR: 18 (21 Nov 2021 21:26) (18 - 18)  SpO2: 96% (21 Nov 2021 21:26) (96% - 98%)  Drug Dosing Weight  Height (cm): 167.6 (19 Nov 2021 09:10)  Weight (kg): 116 (19 Nov 2021 09:10)  BMI (kg/m2): 41.3 (19 Nov 2021 09:10)  BSA (m2): 2.22 (19 Nov 2021 09:10)  I&O's Detail    20 Nov 2021 07:01  -  21 Nov 2021 07:00  --------------------------------------------------------  IN:    Lactated Ringers: 1800 mL    Oral Fluid: 1800 mL  Total IN: 3600 mL    OUT:    Voided (mL): 2000 mL  Total OUT: 2000 mL    Total NET: 1600 mL      21 Nov 2021 07:01  -  22 Nov 2021 02:46  --------------------------------------------------------  IN:    Lactated Ringers: 1425 mL    Oral Fluid: 1320 mL  Total IN: 2745 mL    OUT:  Total OUT: 0 mL    Total NET: 2745 mL        Allergies    chloroseptic spray (Short breath)  No Known Drug Allergies    Intolerances                              9.9    8.37  )-----------( 371      ( 21 Nov 2021 06:43 )             30.8   11-21    137  |  100  |  8.9  ----------------------------<  105<H>  3.4<L>   |  22.0  |  0.68    Ca    8.5<L>      21 Nov 2021 06:43  Phos  3.2     11-21  Mg     1.9     11-21    TPro  6.2<L>  /  Alb  3.2<L>  /  TBili  0.2<L>  /  DBili  0.1  /  AST  39<H>  /  ALT  39<H>  /  AlkPhos  78  11-20  PT/INR - ( 20 Nov 2021 06:04 )   PT: 13.4 sec;   INR: 1.16 ratio         PTT - ( 20 Nov 2021 06:04 )  PTT:33.1 sec    ROS:    PHYSICAL EXAM:  Constitutional: in good spirits  Respiratory: no respiratory distress, no dyspnea, no supplemental o2 needed   Gastrointestinal: abdomen softly distended, trochanter sites c/d/i, no guarding   Genitourinary: voiding spontaneously   Neurological: A&OX3        MEDICATIONS  (STANDING):  acetaminophen     Tablet .. 975 milliGRAM(s) Oral every 6 hours  famotidine    Tablet 20 milliGRAM(s) Oral two times a day  indomethacin Suppository 100 milliGRAM(s) Rectal once  lactated ringers. 1000 milliLiter(s) (75 mL/Hr) IV Continuous <Continuous>  levothyroxine 88 MICROGram(s) Oral daily  multivitamin 1 Tablet(s) Oral daily  piperacillin/tazobactam IVPB. 3.375 Gram(s) IV Intermittent once    MEDICATIONS  (PRN):  ondansetron Injectable 4 milliGRAM(s) IV Push every 6 hours PRN Nausea  simethicone 80 milliGRAM(s) Chew every 6 hours PRN Gas  traMADol 50 milliGRAM(s) Oral every 6 hours PRN Severe Pain (7 - 10)  traMADol 25 milliGRAM(s) Oral every 6 hours PRN Moderate Pain (4 - 6)      RADIOLOGY STUDIES:    CULTURES:        
INTERVAL HPI/OVERNIGHT EVENTS: no complaints, resting comfortably, no events as per bedside RN    STATUS POST:  Lap Dilcia with IOC 11/19      MEDICATIONS  (STANDING):  acetaminophen     Tablet .. 975 milliGRAM(s) Oral every 6 hours  enoxaparin Injectable 40 milliGRAM(s) SubCutaneous at bedtime  famotidine    Tablet 20 milliGRAM(s) Oral two times a day  lactated ringers. 1000 milliLiter(s) (75 mL/Hr) IV Continuous <Continuous>  levothyroxine 88 MICROGram(s) Oral daily  multivitamin 1 Tablet(s) Oral daily    MEDICATIONS  (PRN):  ondansetron Injectable 4 milliGRAM(s) IV Push every 6 hours PRN Nausea  traMADol 25 milliGRAM(s) Oral every 6 hours PRN Moderate Pain (4 - 6)  traMADol 50 milliGRAM(s) Oral every 6 hours PRN Severe Pain (7 - 10)      Vital Signs Last 24 Hrs  T(C): 36.4 (20 Nov 2021 23:46), Max: 36.7 (20 Nov 2021 04:17)  T(F): 97.5 (20 Nov 2021 23:46), Max: 98 (20 Nov 2021 04:17)  HR: 72 (20 Nov 2021 23:46) (72 - 77)  BP: 114/79 (20 Nov 2021 23:46) (114/79 - 138/96)  BP(mean): --  RR: 18 (20 Nov 2021 23:46) (17 - 18)  SpO2: 97% (20 Nov 2021 23:46) (94% - 98%)    PHYSICAL EXAM:      Constitutional: NAD    Respiratory: no accessory muscle use or conversational dyspnea    Cardiovascular: RRR    Gastrointestinal: mild-mod b/l upper quadrant tenderness R>L, soft, ND          I&O's Detail    19 Nov 2021 07:01  -  20 Nov 2021 07:00  --------------------------------------------------------  IN:    Lactated Ringers: 900 mL    Oral Fluid: 240 mL  Total IN: 1140 mL    OUT:    Voided (mL): 1600 mL  Total OUT: 1600 mL    Total NET: -460 mL      20 Nov 2021 07:01  -  21 Nov 2021 03:10  --------------------------------------------------------  IN:    Lactated Ringers: 1125 mL    Oral Fluid: 1560 mL  Total IN: 2685 mL    OUT:    Voided (mL): 1600 mL  Total OUT: 1600 mL    Total NET: 1085 mL          LABS:                        9.2    12.34 )-----------( 384      ( 20 Nov 2021 06:04 )             27.8     11-20    136  |  102  |  8.4  ----------------------------<  106<H>  3.9   |  20.0<L>  |  0.55    Ca    8.5<L>      20 Nov 2021 06:04  Phos  3.6     11-20  Mg     1.9     11-20    TPro  6.2<L>  /  Alb  3.2<L>  /  TBili  0.2<L>  /  DBili  0.1  /  AST  39<H>  /  ALT  39<H>  /  AlkPhos  78  11-20    PT/INR - ( 20 Nov 2021 06:04 )   PT: 13.4 sec;   INR: 1.16 ratio         PTT - ( 20 Nov 2021 06:04 )  PTT:33.1 sec      RADIOLOGY & ADDITIONAL STUDIES:
INTERVAL HPI/OVERNIGHT EVENTS: sleeping comfortably when seen, no complaints or events as per bedside RN    STATUS POST:  lap milton with IOC 11/19      MEDICATIONS  (STANDING):  acetaminophen     Tablet .. 975 milliGRAM(s) Oral every 6 hours  enoxaparin Injectable 40 milliGRAM(s) SubCutaneous at bedtime  famotidine    Tablet 20 milliGRAM(s) Oral two times a day  lactated ringers. 1000 milliLiter(s) (75 mL/Hr) IV Continuous <Continuous>  levothyroxine 88 MICROGram(s) Oral daily  multivitamin 1 Tablet(s) Oral daily    MEDICATIONS  (PRN):  HYDROmorphone  Injectable 0.5 milliGRAM(s) IV Push every 4 hours PRN breakthrough pain  ondansetron Injectable 4 milliGRAM(s) IV Push every 6 hours PRN Nausea  traMADol 25 milliGRAM(s) Oral every 6 hours PRN Moderate Pain (4 - 6)  traMADol 50 milliGRAM(s) Oral every 6 hours PRN Severe Pain (7 - 10)      Vital Signs Last 24 Hrs  T(C): 37 (19 Nov 2021 23:38), Max: 37 (19 Nov 2021 17:01)  T(F): 98.6 (19 Nov 2021 23:38), Max: 98.6 (19 Nov 2021 17:01)  HR: 70 (19 Nov 2021 23:38) (70 - 98)  BP: 120/81 (19 Nov 2021 23:38) (111/73 - 138/83)  BP(mean): 93 (19 Nov 2021 15:30) (72 - 93)  RR: 18 (19 Nov 2021 23:38) (15 - 20)  SpO2: 97% (19 Nov 2021 23:38) (96% - 100%)    PHYSICAL EXAM:      Constitutional: NAD    Respiratory: no accessory muscle use     Cardiovascular: RRR    Gastrointestinal: incision sites C/D/I, appropriately tender, soft, no distention          I&O's Detail    19 Nov 2021 07:01  -  20 Nov 2021 02:17  --------------------------------------------------------  IN:    Lactated Ringers: 525 mL  Total IN: 525 mL    OUT:    Voided (mL): 1600 mL  Total OUT: 1600 mL    Total NET: -1075 mL          LABS:                RADIOLOGY & ADDITIONAL STUDIES:

## 2021-11-23 NOTE — DISCHARGE NOTE PROVIDER - NSDCFUSCHEDAPPT_GEN_ALL_CORE_FT
JOSEP CARRANZA ; 11/30/2021 ; [facility] [address]  JOSEP CARRANZA ; 12/06/2021 ; [facility] [address]  JOSEP CARRANZA ; 12/27/2021 ; [facility] [address]

## 2021-11-23 NOTE — DISCHARGE NOTE PROVIDER - NSDCCPCAREPLAN_GEN_ALL_CORE_FT
PRINCIPAL DISCHARGE DIAGNOSIS  Diagnosis: Gallstone pancreatitis  Assessment and Plan of Treatment: Follow Up: Please call to make an appointment w/ Acute Care Surgery and Gastroenterology 10-14 days after discharge. Also, please call to make an appointment with your primary care physician as per your usual schedule.   Activity: May return to normal activities as tolerated, however refrain from heavy lifting >10-15 pounds.   Diet: May continue regular diet.  Medications: Please take all home medications as prescribed by your primary care doctor. You are encouraged to take over-the-counter tylenol and/or ibuprofen for pain relief.  Wound Care: Please, keep wound site clean. You may shower, but do not bathe.   Patient is advised to RETURN TO THE EMERGENCY DEPARTMENT for any of the following - worsening pain, fever/chills, nausea/vomiting, alterned mental status, chest pain, shortness of breath, or any other new/worsening symptoms.

## 2021-11-23 NOTE — DISCHARGE NOTE PROVIDER - NSFOLLOWUPCLINICS_GEN_ALL_ED_FT
[facility] Acute Care Surgery  Acute Care Surgery  [address] [place] [id number]  Phone: [phone]  Fax:

## 2021-11-29 ENCOUNTER — NON-APPOINTMENT (OUTPATIENT)
Age: 31
End: 2021-11-29

## 2021-11-30 ENCOUNTER — APPOINTMENT (OUTPATIENT)
Dept: OBGYN | Facility: CLINIC | Age: 31
End: 2021-11-30
Payer: MEDICAID

## 2021-11-30 ENCOUNTER — NON-APPOINTMENT (OUTPATIENT)
Age: 31
End: 2021-11-30

## 2021-11-30 DIAGNOSIS — Z13.32 ENCOUNTER FOR SCREENING FOR MATERNAL DEPRESSION: ICD-10-CM

## 2021-11-30 DIAGNOSIS — Z30.09 ENCOUNTER FOR OTHER GENERAL COUNSELING AND ADVICE ON CONTRACEPTION: ICD-10-CM

## 2021-11-30 PROCEDURE — 99213 OFFICE O/P EST LOW 20 MIN: CPT | Mod: 95

## 2021-11-30 NOTE — REASON FOR VISIT
[Home] : at home, [unfilled] , at the time of the visit. [Medical Office: ([facility])___] : at the medical office located in  [Verbal consent obtained from patient] : the patient, [unfilled]

## 2021-11-30 NOTE — HISTORY OF PRESENT ILLNESS
[FreeTextEntry1] : Patients name and date of birth were verified and verbal consent was obtained.\par \par She was informed of her ability to request an in office visit and that an in-office visit may be advised at the conclusion of this encounter.\par \par We discussed and provided via [employer] telehealth application will incorporate security protocols to protect PHI.  There is also the possibility of connection disruption during the visit.  Every effort will be made to re-establish a connection if disconnection occurs. However, there is a possibility the session may be terminated.  \par \par She was informed of telehealth services incur charges similar to an office visit.\par \par She provided verbal consent and wishes to proceed with the visit.\par \par __________________________\par \par We are discussing and screening for Post Partum depression.  She is 2 weeks and 5 days s/p  of baby boy Jay.\par \par She denies symptoms of PP depression at this time, she is doing well and has good home support.\par \par She scored a 3 on the EPDS. She denies hx of Depression.\par \par She is unsure what she'd like to do for contraception at this time.\par \par She has her regularly scheduled PP visit on 21 with MD Schulte.\par \par

## 2021-11-30 NOTE — DISCUSSION/SUMMARY
[FreeTextEntry1] : \par Elaine is not sure of what she would like to do for contraception at this time. Stated they tried for some time for baby Jya and would like another close in age.\par She is scheduled for her 6 week PP visit on 12/27/21 with MD Schulte.\par She was advised to call if any concerns arise prior to next appointment.

## 2021-12-02 ENCOUNTER — LABORATORY RESULT (OUTPATIENT)
Age: 31
End: 2021-12-02

## 2021-12-06 ENCOUNTER — APPOINTMENT (OUTPATIENT)
Dept: ENDOCRINOLOGY | Facility: CLINIC | Age: 31
End: 2021-12-06
Payer: MEDICAID

## 2021-12-06 VITALS
HEIGHT: 66 IN | OXYGEN SATURATION: 98 % | HEART RATE: 100 BPM | BODY MASS INDEX: 37.28 KG/M2 | SYSTOLIC BLOOD PRESSURE: 110 MMHG | WEIGHT: 232 LBS | DIASTOLIC BLOOD PRESSURE: 70 MMHG

## 2021-12-06 DIAGNOSIS — E03.9 HYPOTHYROIDISM, UNSPECIFIED: ICD-10-CM

## 2021-12-06 DIAGNOSIS — E28.2 POLYCYSTIC OVARIAN SYNDROME: ICD-10-CM

## 2021-12-06 PROCEDURE — 99214 OFFICE O/P EST MOD 30 MIN: CPT

## 2021-12-06 RX ORDER — INSULIN HUMAN 100 [IU]/ML
100 INJECTION, SUSPENSION SUBCUTANEOUS
Qty: 3 | Refills: 3 | Status: DISCONTINUED | COMMUNITY
Start: 2021-07-01 | End: 2021-12-06

## 2021-12-06 RX ORDER — INSULIN GLARGINE 100 [IU]/ML
100 INJECTION, SOLUTION SUBCUTANEOUS
Qty: 1 | Refills: 3 | Status: DISCONTINUED | COMMUNITY
Start: 2021-09-17 | End: 2021-12-06

## 2021-12-06 RX ORDER — METFORMIN HYDROCHLORIDE 500 MG/1
500 TABLET, FILM COATED, EXTENDED RELEASE ORAL TWICE DAILY
Qty: 180 | Refills: 0 | Status: ACTIVE | COMMUNITY
Start: 2021-12-06 | End: 1900-01-01

## 2021-12-06 RX ORDER — ASPIRIN 81 MG
81 TABLET,CHEWABLE ORAL
Refills: 0 | Status: DISCONTINUED | COMMUNITY
End: 2021-12-06

## 2021-12-06 NOTE — ASSESSMENT
[FreeTextEntry1] : 32 y/o obese female with Hypothyroidism, Type 2 DM (now prediabetes), and PCOS.\par \par Plan: \par Hypothyroidism: TSH suppressed after delivery \par - decrease Levothyroxine to 75 mcg daily \par - TSH goal < 2.5 in pregnancy and during conceiving \par - repeat Thyroid sonogram in 1/2022\par - repeat TFTs 4-6 weeks\par \par Type 2 DM (now prediabetes): restart Metformin \par - follow a low carb diet \par - increase routine exercise regimen to 150 minutes a week\par - check A1C in 3 months \par \par PCOS: restart Metformin\par \par \par RTO in 8-10 weeks

## 2021-12-06 NOTE — REVIEW OF SYSTEMS
[Decreased Appetite] : decreased appetite [Recent Weight Loss (___ Lbs)] : recent weight loss: [unfilled] lbs [Fatigue] : no fatigue [Visual Field Defect] : no visual field defect [Blurred Vision] : no blurred vision [Dysphagia] : no dysphagia [Neck Pain] : no neck pain [Dysphonia] : no dysphonia [Chest Pain] : no chest pain [Palpitations] : no palpitations [Constipation] : no constipation [Diarrhea] : no diarrhea [Polyuria] : no polyuria [Dysuria] : no dysuria [Dry Skin] : no dry skin [Hair Loss] : no hair loss [Headaches] : no headaches [Tremors] : no tremors [Depression] : no depression [Anxiety] : no anxiety [Polydipsia] : no polydipsia [Cold Intolerance] : no cold intolerance [Heat Intolerance] : no heat intolerance

## 2021-12-06 NOTE — HISTORY OF PRESENT ILLNESS
[FreeTextEntry1] : Delivered via vaginal a healthy boy at 39 weeks gestation, Jay on 11/11/21 weighing 7 lbs 2 oz, currently bottle feeding  \par Pt. reports she had pancreatitis a week before delivery then after delivery she still had pancreatitis then her gallbladder was removed at [facility]. She is feeling better since surgery.  \par Pt. would like to have another pregnancy in the near future \par \par Quality: Hypothyroidism\par Severity: moderate \par Duration: over 4 years ago \par Onset: weight gain, fatigue \par Modifying Factors: Better with medication \par Associated Symptoms: no neck pain, dysphonia, or dysphagia \par Family History: diabetes - "all women in my family"\par \par Notes: Obstetrician: [facility] \par \par \par Current Regimen: Levothyroxine 88 mcg daily - taking appropriately, waits an hour to eat or drink anything \par \par Labs: \par 12/2/21\par TSH 0.03\par Free T4 1.6\par Free T3 3.7\par \par \par Date of last thyroid sonogram: 1/7/20 Impression: Heterogenous in appearance without focal nodules. The right lobe is normal in size and the left lobe is mildly prominent size. Borderline enlarged benign appearing level II lymph node see in each side of neck. \par \par Type 2 DM (now Prediabetes): dx with GDM - insulin controlled and is followed by MFM. Her fasting blood sugars have been in the 80s. Currently on insulin due to GDM. \par \par \par Chronic Obesity \par Weight: lost 32 pounds since delivery \par

## 2021-12-07 ENCOUNTER — APPOINTMENT (OUTPATIENT)
Dept: TRAUMA SURGERY | Facility: CLINIC | Age: 31
End: 2021-12-07
Payer: MEDICAID

## 2021-12-07 VITALS
BODY MASS INDEX: 39.12 KG/M2 | HEART RATE: 75 BPM | OXYGEN SATURATION: 100 % | WEIGHT: 232 LBS | TEMPERATURE: 98 F | DIASTOLIC BLOOD PRESSURE: 88 MMHG | HEIGHT: 64.5 IN | SYSTOLIC BLOOD PRESSURE: 130 MMHG | RESPIRATION RATE: 16 BRPM

## 2021-12-07 DIAGNOSIS — Z84.1 FAMILY HISTORY OF DISORDERS OF KIDNEY AND URETER: ICD-10-CM

## 2021-12-07 DIAGNOSIS — Z86.39 PERSONAL HISTORY OF OTHER ENDOCRINE, NUTRITIONAL AND METABOLIC DISEASE: ICD-10-CM

## 2021-12-07 DIAGNOSIS — Z87.891 PERSONAL HISTORY OF NICOTINE DEPENDENCE: ICD-10-CM

## 2021-12-07 DIAGNOSIS — Z90.49 ACQUIRED ABSENCE OF OTHER SPECIFIED PARTS OF DIGESTIVE TRACT: ICD-10-CM

## 2021-12-07 DIAGNOSIS — Z83.49 FAMILY HISTORY OF OTHER ENDOCRINE, NUTRITIONAL AND METABOLIC DISEASES: ICD-10-CM

## 2021-12-07 PROCEDURE — 99024 POSTOP FOLLOW-UP VISIT: CPT

## 2021-12-07 NOTE — PHYSICAL EXAM
[Normal Breath Sounds] : Normal breath sounds [Normal Heart Sounds] : normal heart sounds [Abdomen Tenderness] : ~T ~M No abdominal tenderness [No Rash or Lesion] : No rash or lesion [Purpura] : no purpura  [Petechiae] : no petechiae [Skin Ulcer] : no ulcer [Skin Induration] : no induration [Alert] : alert [Oriented to Person] : oriented to person [Oriented to Place] : oriented to place [Oriented to Time] : oriented to time [Calm] : calm [de-identified] : wdwn female  in  nad  pleasant  mannered  [de-identified] : PERRLA EOMS intact  and  nl   non icteric sclera [de-identified] : trachea  midline [de-identified] : soft   non  tender   no distension  of  abdomen  no  guarding  no rebound  all incision sites c/d/i no signs of  cellulitis  no  formation  of  seroma

## 2021-12-07 NOTE — HISTORY OF PRESENT ILLNESS
[FreeTextEntry1] : Patient presents  to [facility]  clinic for  post  op exam  s/p laparoscopic cholecystectomy  and  Intra operative  cholangiogram with placement  of  stent   Patient  at  time  of  this  exam denies  any  fevers  no chills  no  n/v/d  nl bm nl urination   Post partum  s/p  vaginal delivery   Patient  denies  any  abdominal pain

## 2021-12-07 NOTE — ASSESSMENT
[FreeTextEntry1] : RTC prn \par Diet  modification \par  Shower daily no  bathing\par Discussion with patient   All questions answered  Any acute symptoms and or concerns patient understands  to call back office  and  or  go  directly to [facility] ED\par

## 2021-12-08 LAB — SURGICAL PATHOLOGY STUDY: SIGNIFICANT CHANGE UP

## 2021-12-09 ENCOUNTER — APPOINTMENT (OUTPATIENT)
Dept: GASTROENTEROLOGY | Facility: CLINIC | Age: 31
End: 2021-12-09
Payer: MEDICAID

## 2021-12-09 ENCOUNTER — APPOINTMENT (OUTPATIENT)
Dept: RADIOLOGY | Facility: CLINIC | Age: 31
End: 2021-12-09

## 2021-12-09 ENCOUNTER — OUTPATIENT (OUTPATIENT)
Dept: OUTPATIENT SERVICES | Facility: HOSPITAL | Age: 31
LOS: 1 days | End: 2021-12-09
Payer: MEDICAID

## 2021-12-09 ENCOUNTER — RESULT REVIEW (OUTPATIENT)
Age: 31
End: 2021-12-09

## 2021-12-09 VITALS
SYSTOLIC BLOOD PRESSURE: 140 MMHG | WEIGHT: 232 LBS | BODY MASS INDEX: 39.12 KG/M2 | OXYGEN SATURATION: 98 % | HEART RATE: 80 BPM | HEIGHT: 64.5 IN | TEMPERATURE: 98 F | DIASTOLIC BLOOD PRESSURE: 80 MMHG | RESPIRATION RATE: 16 BRPM

## 2021-12-09 DIAGNOSIS — Z09 ENCOUNTER FOR FOLLOW-UP EXAMINATION AFTER COMPLETED TREATMENT FOR CONDITIONS OTHER THAN MALIGNANT NEOPLASM: ICD-10-CM

## 2021-12-09 DIAGNOSIS — Z96.89 PRESENCE OF OTHER SPECIFIED FUNCTIONAL IMPLANTS: ICD-10-CM

## 2021-12-09 DIAGNOSIS — K08.409 PARTIAL LOSS OF TEETH, UNSPECIFIED CAUSE, UNSPECIFIED CLASS: Chronic | ICD-10-CM

## 2021-12-09 DIAGNOSIS — Z98.890 OTHER SPECIFIED POSTPROCEDURAL STATES: Chronic | ICD-10-CM

## 2021-12-09 DIAGNOSIS — K85.10 BILIARY ACUTE PANCREATITIS WITHOUT NECROSIS OR INFECTION: ICD-10-CM

## 2021-12-09 PROCEDURE — 74018 RADEX ABDOMEN 1 VIEW: CPT | Mod: 26

## 2021-12-09 PROCEDURE — 74018 RADEX ABDOMEN 1 VIEW: CPT

## 2021-12-09 PROCEDURE — 99213 OFFICE O/P EST LOW 20 MIN: CPT

## 2021-12-09 NOTE — HISTORY OF PRESENT ILLNESS
[de-identified] : Patient arrived for a follow-up after cholecystectomy and intraoperative cholangiogram revealing bile duct stone. Patient underwent ERCP with biliary sphincterotomy and sludge removal. Pancreatic duct stent was placed to reduce any post ERCP pancreatitis. Patient is doing reasonably well. No GI complaints.

## 2021-12-09 NOTE — ASSESSMENT
[FreeTextEntry1] : The patient with history of biliary pancreatitis status post laparoscopic cholecystectomy and positive IOC. Status post ERCP with biliary sphincterotomy and now with pancreatic duct stent in place. Symptoms are resolved. Doing well. We'll obtain the labs and order a abdominal x-ray to confirm pancreatic duct stent migration. If this pancreatic duct stent is in place, we will perform EGD with stent removal.\par \par Yaya Edmonds MD\par Gastroenterology \par \par

## 2021-12-09 NOTE — PHYSICAL EXAM
[General Appearance - Alert] : alert [General Appearance - In No Acute Distress] : in no acute distress [Sclera] : the sclera and conjunctiva were normal [PERRL With Normal Accommodation] : pupils were equal in size, round, and reactive to light [Extraocular Movements] : extraocular movements were intact [Outer Ear] : the ears and nose were normal in appearance [Oropharynx] : the oropharynx was normal [Neck Appearance] : the appearance of the neck was normal [Neck Cervical Mass (___cm)] : no neck mass was observed [Jugular Venous Distention Increased] : there was no jugular-venous distention [Thyroid Diffuse Enlargement] : the thyroid was not enlarged [Thyroid Nodule] : there were no palpable thyroid nodules [Auscultation Breath Sounds / Voice Sounds] : lungs were clear to auscultation bilaterally [Heart Rate And Rhythm] : heart rate was normal and rhythm regular [Heart Sounds] : normal S1 and S2 [Heart Sounds Gallop] : no gallops [Murmurs] : no murmurs [Heart Sounds Pericardial Friction Rub] : no pericardial rub [Bowel Sounds] : normal bowel sounds [Abdomen Soft] : soft [Abdomen Tenderness] : non-tender [Abdomen Mass (___ Cm)] : no abdominal mass palpated [Cervical Lymph Nodes Enlarged Posterior Bilaterally] : posterior cervical [Cervical Lymph Nodes Enlarged Anterior Bilaterally] : anterior cervical [Supraclavicular Lymph Nodes Enlarged Bilaterally] : supraclavicular [No CVA Tenderness] : no ~M costovertebral angle tenderness [No Spinal Tenderness] : no spinal tenderness [Abnormal Walk] : normal gait [Nail Clubbing] : no clubbing  or cyanosis of the fingernails [Musculoskeletal - Swelling] : no joint swelling seen [Motor Tone] : muscle strength and tone were normal [Skin Color & Pigmentation] : normal skin color and pigmentation [Skin Turgor] : normal skin turgor [] : no rash [No Focal Deficits] : no focal deficits [Oriented To Time, Place, And Person] : oriented to person, place, and time [Impaired Insight] : insight and judgment were intact [Affect] : the affect was normal [FreeTextEntry1] : Cholecystectomy scars, obese

## 2021-12-13 LAB
ALBUMIN SERPL ELPH-MCNC: 4.4 G/DL
ALP BLD-CCNC: 86 U/L
ALT SERPL-CCNC: 22 U/L
ANION GAP SERPL CALC-SCNC: 13 MMOL/L
AST SERPL-CCNC: 21 U/L
BASOPHILS # BLD AUTO: 0.04 K/UL
BASOPHILS NFR BLD AUTO: 0.4 %
BILIRUB SERPL-MCNC: 0.2 MG/DL
BUN SERPL-MCNC: 11 MG/DL
CALCIUM SERPL-MCNC: 9.7 MG/DL
CHLORIDE SERPL-SCNC: 103 MMOL/L
CO2 SERPL-SCNC: 23 MMOL/L
CREAT SERPL-MCNC: 0.77 MG/DL
EOSINOPHIL # BLD AUTO: 0.15 K/UL
EOSINOPHIL NFR BLD AUTO: 1.6 %
GLUCOSE SERPL-MCNC: 115 MG/DL
HCT VFR BLD CALC: 38.1 %
HGB BLD-MCNC: 12 G/DL
IMM GRANULOCYTES NFR BLD AUTO: 0.2 %
LYMPHOCYTES # BLD AUTO: 3.02 K/UL
LYMPHOCYTES NFR BLD AUTO: 33 %
MAN DIFF?: NORMAL
MCHC RBC-ENTMCNC: 29.3 PG
MCHC RBC-ENTMCNC: 31.5 GM/DL
MCV RBC AUTO: 92.9 FL
MONOCYTES # BLD AUTO: 0.52 K/UL
MONOCYTES NFR BLD AUTO: 5.7 %
NEUTROPHILS # BLD AUTO: 5.39 K/UL
NEUTROPHILS NFR BLD AUTO: 59.1 %
PLATELET # BLD AUTO: 311 K/UL
POTASSIUM SERPL-SCNC: 3.9 MMOL/L
PROT SERPL-MCNC: 7.4 G/DL
RBC # BLD: 4.1 M/UL
RBC # FLD: 12.4 %
SODIUM SERPL-SCNC: 139 MMOL/L
WBC # FLD AUTO: 9.14 K/UL

## 2021-12-15 NOTE — DISCHARGE NOTE ANTEPARTUM - CALL YOUR HEALTHCARE PROVIDER IF YOU ARE EXPERIENCING SYMPTOMS OF DEPRESSION THAT LAST MORE THAN THREE DAYS
Narcan given  0.2mg  ( given in .08mg  increments) Statement Selected pt required second dose of Narcan 0.2mg  + response Pt required 3rd dose  naloxone   0.28mg   - pt  placed on naloxone  drip

## 2021-12-27 ENCOUNTER — APPOINTMENT (OUTPATIENT)
Dept: OBGYN | Facility: CLINIC | Age: 31
End: 2021-12-27
Payer: MEDICAID

## 2021-12-27 VITALS
BODY MASS INDEX: 42.1 KG/M2 | HEIGHT: 64 IN | DIASTOLIC BLOOD PRESSURE: 70 MMHG | SYSTOLIC BLOOD PRESSURE: 120 MMHG | WEIGHT: 246.6 LBS

## 2021-12-27 DIAGNOSIS — E03.9 ENDOCRINE, NUTRITIONAL AND METABOLIC DISEASES COMPLICATING PREGNANCY, THIRD TRIMESTER: ICD-10-CM

## 2021-12-27 DIAGNOSIS — K80.20 CALCULUS OF GALLBLADDER W/OUT CHOLECYSTITIS W/OUT OBSTRUCTION: ICD-10-CM

## 2021-12-27 DIAGNOSIS — O99.283 ENDOCRINE, NUTRITIONAL AND METABOLIC DISEASES COMPLICATING PREGNANCY, THIRD TRIMESTER: ICD-10-CM

## 2021-12-27 DIAGNOSIS — O99.213 OBESITY COMPLICATING PREGNANCY, THIRD TRIMESTER: ICD-10-CM

## 2021-12-27 DIAGNOSIS — Z32.02 ENCOUNTER FOR PREGNANCY TEST, RESULT NEGATIVE: ICD-10-CM

## 2021-12-27 DIAGNOSIS — R73.03 PREDIABETES.: ICD-10-CM

## 2021-12-27 DIAGNOSIS — Z34.93 ENCOUNTER FOR SUPERVISION OF NORMAL PREGNANCY, UNSPECIFIED, THIRD TRIMESTER: ICD-10-CM

## 2021-12-27 LAB
HCG UR QL: NEGATIVE
QUALITY CONTROL: YES

## 2021-12-27 PROCEDURE — 81025 URINE PREGNANCY TEST: CPT

## 2021-12-27 PROCEDURE — 0503F POSTPARTUM CARE VISIT: CPT

## 2021-12-28 PROBLEM — Z32.02 ENCOUNTER FOR PREGNANCY TEST WITH RESULT NEGATIVE: Status: ACTIVE | Noted: 2021-12-28

## 2021-12-28 PROBLEM — O99.213 OBESITY AFFECTING PREGNANCY IN THIRD TRIMESTER: Status: RESOLVED | Noted: 2021-09-20 | Resolved: 2021-12-28

## 2021-12-28 PROBLEM — K80.20 GALL STONES: Status: RESOLVED | Noted: 2021-12-07 | Resolved: 2021-12-28

## 2021-12-28 PROBLEM — R73.03 PREDIABETES: Status: RESOLVED | Noted: 2021-12-06 | Resolved: 2021-12-28

## 2021-12-28 PROBLEM — O99.283 HYPOTHYROIDISM AFFECTING PREGNANCY IN THIRD TRIMESTER: Status: RESOLVED | Noted: 2021-09-20 | Resolved: 2021-12-28

## 2021-12-28 PROBLEM — Z34.93 THIRD TRIMESTER PREGNANCY: Status: RESOLVED | Noted: 2021-10-04 | Resolved: 2021-12-28

## 2021-12-28 NOTE — HISTORY OF PRESENT ILLNESS
[Delivery Date: ___] : on [unfilled] [] : delivered by vaginal delivery [Male] : Delivery History: baby boy [Wt. ___] : weighing [unfilled] [Examination Of The Breasts] : breasts are normal [Doing Well] : is doing well [No Sign of Infection] : is showing no signs of infection [Excellent Pain Control] : has excellent pain control [Complications:___] : complications include: [unfilled] [Breastfeeding] : not currently nursing [Resumed Menses] : has not resumed her menses [Resumed [place]] : has not resumed intercourse [Intended Contraception] : the patient does not intended to use contraception postpartum [Abdominal Pain] : no abdominal pain [Back Pain] : no back pain [S/Sx PP Depression] : no signs/symptoms of postpartum depression [Episiotomy Site Pain] : no episiotomy site pain [Heavy Bleeding] : no heavy bleeding [Irregular Bleeding] : no irregular bleeding [Leg Pain] : no leg pain [Shortness of Breath] : no shortness of breath [Suicidal Ideation] : no suicidal ideation [Chills] : no chills [Fatigue] : no fatigue [Dysuria] : no dysuria [Fever] : no fever [Headache] : no headache [Nausea] : no nausea [Vomiting] : no vomiting [Back to Normal] : is back to normal in size [None] : no vaginal bleeding [Normal] : the vagina was normal [Hematoma] : no vaginal hematoma [Abscess Formation] : no vaginal abscess [Healing Well] : is healing well [Infected] : did not appear infected [Dehiscence] : was not dehisced [FreeTextEntry9] : gallstones, cholecystectomy postpartum  [de-identified] : Follow with endo, she is currently taking metformin [de-identified] : RTO for annual exam. She is declining contraception and we discussed the importance of adequate interpregnancy interval.

## 2022-01-19 ENCOUNTER — RX RENEWAL (OUTPATIENT)
Age: 32
End: 2022-01-19

## 2022-01-19 RX ORDER — LEVOTHYROXINE SODIUM 0.07 MG/1
75 TABLET ORAL
Qty: 90 | Refills: 0 | Status: ACTIVE | COMMUNITY
Start: 2020-04-01 | End: 1900-01-01

## 2022-02-02 ENCOUNTER — APPOINTMENT (OUTPATIENT)
Dept: ENDOCRINOLOGY | Facility: CLINIC | Age: 32
End: 2022-02-02

## 2022-06-03 NOTE — OB RN DELIVERY SUMMARY - NS_PLACENTA_OBGYN_ALL_OB_DT
11-Nov-2021 08:02 PAST MEDICAL HISTORY:  Dyspnea and respiratory abnormality Dyspnea on exertion    Essential hypertension Hypertension    Hodgkin's disease Hodgkin lymphoma    Hypothyroidism Hypothyroid

## 2022-06-11 ENCOUNTER — NON-APPOINTMENT (OUTPATIENT)
Age: 32
End: 2022-06-11

## 2023-06-26 NOTE — OB PROVIDER IHI INDUCTION/AUGMENTATION NOTE - NS_IHIMETHOD_OBGYN_ALL_OB
Take blood pressure twice daily. Call office if blood pressure greater than 140 /90. Keep maternal fetal office appointment for July 5th.   
Pitocin
Cytotec PO

## 2024-09-18 NOTE — OB PROVIDER DELIVERY SUMMARY - NSATTEMPTEDVBAC_OBGYN_ALL_OB
SURVEY:    You may be receiving a survey from [facility] regarding your visit today.    Please complete the survey to enable us to provide the highest quality of care to you and your family.    If you cannot score us a very good on any question, please call the office to discuss how we could have made your experience a very good one.    Thank you.  [facility]  MD Merced Ocasio, MD Gerber Lane, DO  Enrique Vang, MD Neida Zheng, APRN-CNP  Bella Gomse, Practice Manager  Rena, CMA  Iza, CCMKEISHA Hensley, CMA  Vicente, PSC   Marilu, LONNYN       No drops in the left eye until further instructions tomorrow at followup appointment Not Applicable

## 2024-12-18 NOTE — OB PROVIDER H&P - NSSCHADMISSION_OBGYN_A_OB
If your child received fluoride varnish today, here are some general guidelines for the rest of the day.    Your child can eat and drink right away after varnish is applied but should AVOID hot liquids or sticky/crunchy foods for 24 hours.    Don't brush or floss your teeth for the next 4-6 hours and resume regular brushing, flossing and dental checkups after this initial time period.    Patient Education    BRIGHT FUTURES HANDOUT- PARENT  18 MONTH VISIT  Here are some suggestions from [employer] experts that may be of value to your family.     YOUR CHILD S BEHAVIOR  Expect your child to cling to you in new situations or to be anxious around strangers.  Play with your child each day by doing things she likes.  Be consistent in discipline and setting limits for your child.  Plan ahead for difficult situations and try things that can make them easier. Think about your day and your child s energy and mood.  Wait until your child is ready for toilet training. Signs of being ready for toilet training include  Staying dry for 2 hours  Knowing if she is wet or dry  Can pull pants down and up  Wanting to learn  Can tell you if she is going to have a bowel movement  Read books about toilet training with your child.  Praise sitting on the potty or toilet.  If you are expecting a new baby, you can read books about being a big brother or sister.  Recognize what your child is able to do. Don t ask her to do things she is not ready to do at this age.    YOUR CHILD AND TV  Do activities with your child such as reading, playing games, and singing.  Be active together as a family. Make sure your child is active at home, in , and with sitters.  If you choose to introduce media now,  Choose high-quality programs and apps.  Use them together.  Limit viewing to 1 hour or less each day.  Avoid using TV, tablets, or smartphones to keep your child busy.  Be aware of how much media you use.    TALKING AND HEARING  Read and  sing to your child often.  Talk about and describe pictures in books.  Use simple words with your child.  Suggest words that describe emotions to help your child learn the language of feelings.  Ask your child simple questions, offer praise for answers, and explain simply.  Use simple, clear words to tell your child what you want him to do.    HEALTHY EATING  Offer your child a variety of healthy foods and snacks, especially vegetables, fruits, and lean protein.  Give one bigger meal and a few smaller snacks or meals each day.  Let your child decide how much to eat.  Give your child 16 to 24 oz of milk each day.  Know that you don t need to give your child juice. If you do, don t give more than 4 oz a day of 100% juice and serve it with meals.  Give your toddler many chances to try a new food. Allow her to touch and put new food into her mouth so she can learn about them.    SAFETY  Make sure your child s car safety seat is rear facing until he reaches the highest weight or height allowed by the car safety seat s . This will probably be after the second birthday.  Never put your child in the front seat of a vehicle that has a passenger airbag. The back seat is the safest.  Everyone should wear a seat belt in the car.  Keep poisons, medicines, and lawn and cleaning supplies in locked cabinets, out of your child s sight and reach.  Put the Poison Help number into all phones, including cell phones. Call if you are worried your child has swallowed something harmful. Do not make your child vomit.  When you go out, put a hat on your child, have him wear sun protection clothing, and apply sunscreen with SPF of 15 or higher on his exposed skin. Limit time outside when the sun is strongest (11:00 am-3:00 pm).  If it is necessary to keep a gun in your home, store it unloaded and locked with the ammunition locked separately.    WHAT TO EXPECT AT YOUR CHILD S 2 YEAR VISIT  We will talk about  Caring for your child,  your family, and yourself  Handling your child s behavior  Supporting your talking child  Starting toilet training  Keeping your child safe at home, outside, and in the car        Helpful Resources: Poison Help Line:  637.258.8926  Information About Car Safety Seats: www.safercar.gov/parents  Toll-free Auto Safety Hotline: 652.312.3067  Consistent with Bright Futures: Guidelines for Health Supervision of Infants, Children, and Adolescents, 4th Edition  For more information, go to https://brightfutures.aap.org.                    No

## 2025-05-19 NOTE — OB RN PATIENT PROFILE - PRO INTERPRETER NEED 2
Have You Had Dysport Before?: has had dysport When Was Your Last Dysport Treatment?: 01/01/2025 English

## 2025-07-17 NOTE — COUNSELING
Physical Therapy Visit    Visit Type: Daily Treatment Note  Visit: 2  Referring Provider: Shayy Abarca MD  Medical Diagnosis (from order): R29.6 - Recurrent falls     SUBJECTIVE                                                                                                               Patient reporting the exercises are going well. Reports she is most concerned about her balance when she is turning.       OBJECTIVE                                                                                                                                Ambulation / Gait    Beginning of appointment:   Presents with straight post cane in right hand paired with right lower extremity -- demonstrating right shoulder elevation, with right lean lateral flexion over cane when right lower extremity in stance    End of appointment:  Cane in right upper extremity paired with left lower extremity, no shoulder elevation, no lateral trunk flexion - demonstrating safe sequencing of device            Treatment     Therapeutic Exercise  NuStep x 5 minutes for closed chain lower extremity strengthening while gathering subjective  Review of home exercise program:   Seated march 1x10 each side   -reviewed, cues for slow eccentric phase   Seated clamshell (green theraband) x10   -patient reporting some pain in left hip, discontinued and removed from home exercise program  Seated heel/toe raise 2x10*  -encouraged slow and controlled motions     Revised home exercise program    Education:   -on importance of strong lower extremity muscles to support balance   -that all home exercise program should be completed to comfort and exercises should be non-irritating     Gait Training  Gait training with straight post cane in right upper extremity paired with left leg x100 feet  -educated on straight post cane for balance assist, not for weightbearing   -education on cane sequencing for safety and support to lower extremity  -fit cane to patient with  [Contraception/ Emergency Contraception/ Safe Sexual Practices] : contraception, emergency contraception, safe sexual practices patient reporting liking this change   -required corrective cuing several times -- stop, reset and re-begin sequencing x10    Slow gait pattern --- required several reminders to complete step through pattern pairing cane with left lower extremity, able to correct with cuing     Skilled input: verbal instruction/cues, tactile instruction/cues, demonstration, as detailed above and posture correction    Writer verbally educated and received verbal consent for hand placement, positioning of patient, and techniques to be performed today from patient for clothing adjustments for techniques, hand placement and palpation for techniques and therapist position for techniques as described above and how they are pertinent to the patient's plan of care.  Home Exercise Program  Access Code: [email or web address]  URL: [email or web address]  Date: 07/17/2025  Prepared by: Nathalie Tipton    Exercises  - Seated March  - 1 x daily - 7 x weekly - 2 sets - 10 reps  - Seated Heel Raise  - 1 x daily - 7 x weekly - 2 sets - 10 reps  - Seated Toe Raise  - 1 x daily - 7 x weekly - 2 sets - 10 reps      ASSESSMENT                                                                                                            Patient presents to first follow up appointment. Session focused on review of home exercise program with progressions where able as well as gait training with straight post cane in right upper extremity paired with left lower extremity. Patient required visual demonstration from therapist as well as several corrective cues during gait training, but was able to successfully demonstrate safe sequencing of device with patient demonstrating correct sequencing while ambulating out of clinic at end of appointment. Will monitor and progress as able. Patient will benefit from continued skilled therapy to address deficits and restore patient to optimal function.     Education:   - Results of above outlined education:  Verbalizes understanding, Needs reinforcement and Demonstrates understanding    PLAN                                                                                                                           Suggestions for next session as indicated: Progress per plan of care           Therapy procedure time and total treatment time can be found documented on the Time Entry flowsheet